# Patient Record
Sex: FEMALE | Race: WHITE | NOT HISPANIC OR LATINO | URBAN - METROPOLITAN AREA
[De-identification: names, ages, dates, MRNs, and addresses within clinical notes are randomized per-mention and may not be internally consistent; named-entity substitution may affect disease eponyms.]

---

## 2022-06-15 ENCOUNTER — EMERGENCY (EMERGENCY)
Facility: HOSPITAL | Age: 37
LOS: 1 days | Discharge: HOME | End: 2022-06-15
Attending: STUDENT IN AN ORGANIZED HEALTH CARE EDUCATION/TRAINING PROGRAM | Admitting: STUDENT IN AN ORGANIZED HEALTH CARE EDUCATION/TRAINING PROGRAM
Payer: COMMERCIAL

## 2022-06-15 VITALS
HEART RATE: 92 BPM | HEIGHT: 67 IN | WEIGHT: 212.08 LBS | RESPIRATION RATE: 16 BRPM | SYSTOLIC BLOOD PRESSURE: 140 MMHG | TEMPERATURE: 98 F | DIASTOLIC BLOOD PRESSURE: 100 MMHG | OXYGEN SATURATION: 98 %

## 2022-06-15 VITALS
SYSTOLIC BLOOD PRESSURE: 112 MMHG | TEMPERATURE: 98 F | OXYGEN SATURATION: 98 % | DIASTOLIC BLOOD PRESSURE: 69 MMHG | HEART RATE: 63 BPM | RESPIRATION RATE: 18 BRPM

## 2022-06-15 DIAGNOSIS — Z20.822 CONTACT WITH AND (SUSPECTED) EXPOSURE TO COVID-19: ICD-10-CM

## 2022-06-15 DIAGNOSIS — M54.9 DORSALGIA, UNSPECIFIED: ICD-10-CM

## 2022-06-15 DIAGNOSIS — M48.061 SPINAL STENOSIS, LUMBAR REGION WITHOUT NEUROGENIC CLAUDICATION: ICD-10-CM

## 2022-06-15 LAB
ALBUMIN SERPL ELPH-MCNC: 4.7 G/DL — SIGNIFICANT CHANGE UP (ref 3.3–5)
ALP SERPL-CCNC: 75 U/L — SIGNIFICANT CHANGE UP (ref 40–120)
ALT FLD-CCNC: 17 U/L — SIGNIFICANT CHANGE UP (ref 10–45)
ANION GAP SERPL CALC-SCNC: 12 MMOL/L — SIGNIFICANT CHANGE UP (ref 5–17)
APTT BLD: 33.7 SEC — SIGNIFICANT CHANGE UP (ref 27.5–35.5)
AST SERPL-CCNC: 23 U/L — SIGNIFICANT CHANGE UP (ref 10–40)
BASOPHILS # BLD AUTO: 0.07 K/UL — SIGNIFICANT CHANGE UP (ref 0–0.2)
BASOPHILS NFR BLD AUTO: 0.8 % — SIGNIFICANT CHANGE UP (ref 0–2)
BILIRUB SERPL-MCNC: 0.3 MG/DL — SIGNIFICANT CHANGE UP (ref 0.2–1.2)
BLD GP AB SCN SERPL QL: NEGATIVE — SIGNIFICANT CHANGE UP
BUN SERPL-MCNC: 11 MG/DL — SIGNIFICANT CHANGE UP (ref 7–23)
CALCIUM SERPL-MCNC: 10 MG/DL — SIGNIFICANT CHANGE UP (ref 8.4–10.5)
CHLORIDE SERPL-SCNC: 104 MMOL/L — SIGNIFICANT CHANGE UP (ref 96–108)
CO2 SERPL-SCNC: 25 MMOL/L — SIGNIFICANT CHANGE UP (ref 22–31)
CREAT SERPL-MCNC: 0.71 MG/DL — SIGNIFICANT CHANGE UP (ref 0.5–1.3)
EGFR: 112 ML/MIN/1.73M2 — SIGNIFICANT CHANGE UP
EOSINOPHIL # BLD AUTO: 0.32 K/UL — SIGNIFICANT CHANGE UP (ref 0–0.5)
EOSINOPHIL NFR BLD AUTO: 3.7 % — SIGNIFICANT CHANGE UP (ref 0–6)
GLUCOSE SERPL-MCNC: 112 MG/DL — HIGH (ref 70–99)
HCG SERPL-ACNC: <0 MIU/ML — SIGNIFICANT CHANGE UP
HCT VFR BLD CALC: 39 % — SIGNIFICANT CHANGE UP (ref 34.5–45)
HGB BLD-MCNC: 12.5 G/DL — SIGNIFICANT CHANGE UP (ref 11.5–15.5)
IMM GRANULOCYTES NFR BLD AUTO: 0.2 % — SIGNIFICANT CHANGE UP (ref 0–1.5)
INR BLD: 0.99 — SIGNIFICANT CHANGE UP (ref 0.88–1.16)
LYMPHOCYTES # BLD AUTO: 2.46 K/UL — SIGNIFICANT CHANGE UP (ref 1–3.3)
LYMPHOCYTES # BLD AUTO: 28.4 % — SIGNIFICANT CHANGE UP (ref 13–44)
MCHC RBC-ENTMCNC: 27.1 PG — SIGNIFICANT CHANGE UP (ref 27–34)
MCHC RBC-ENTMCNC: 32.1 GM/DL — SIGNIFICANT CHANGE UP (ref 32–36)
MCV RBC AUTO: 84.4 FL — SIGNIFICANT CHANGE UP (ref 80–100)
MONOCYTES # BLD AUTO: 0.43 K/UL — SIGNIFICANT CHANGE UP (ref 0–0.9)
MONOCYTES NFR BLD AUTO: 5 % — SIGNIFICANT CHANGE UP (ref 2–14)
NEUTROPHILS # BLD AUTO: 5.37 K/UL — SIGNIFICANT CHANGE UP (ref 1.8–7.4)
NEUTROPHILS NFR BLD AUTO: 61.9 % — SIGNIFICANT CHANGE UP (ref 43–77)
NRBC # BLD: 0 /100 WBCS — SIGNIFICANT CHANGE UP (ref 0–0)
PLATELET # BLD AUTO: 354 K/UL — SIGNIFICANT CHANGE UP (ref 150–400)
POTASSIUM SERPL-MCNC: 4.2 MMOL/L — SIGNIFICANT CHANGE UP (ref 3.5–5.3)
POTASSIUM SERPL-SCNC: 4.2 MMOL/L — SIGNIFICANT CHANGE UP (ref 3.5–5.3)
PROT SERPL-MCNC: 7.9 G/DL — SIGNIFICANT CHANGE UP (ref 6–8.3)
PROTHROM AB SERPL-ACNC: 11.8 SEC — SIGNIFICANT CHANGE UP (ref 10.5–13.4)
RBC # BLD: 4.62 M/UL — SIGNIFICANT CHANGE UP (ref 3.8–5.2)
RBC # FLD: 12 % — SIGNIFICANT CHANGE UP (ref 10.3–14.5)
RH IG SCN BLD-IMP: NEGATIVE — SIGNIFICANT CHANGE UP
SARS-COV-2 RNA SPEC QL NAA+PROBE: NEGATIVE — SIGNIFICANT CHANGE UP
SODIUM SERPL-SCNC: 141 MMOL/L — SIGNIFICANT CHANGE UP (ref 135–145)
WBC # BLD: 8.67 K/UL — SIGNIFICANT CHANGE UP (ref 3.8–10.5)
WBC # FLD AUTO: 8.67 K/UL — SIGNIFICANT CHANGE UP (ref 3.8–10.5)

## 2022-06-15 PROCEDURE — 93005 ELECTROCARDIOGRAM TRACING: CPT

## 2022-06-15 PROCEDURE — 85610 PROTHROMBIN TIME: CPT

## 2022-06-15 PROCEDURE — 85025 COMPLETE CBC W/AUTO DIFF WBC: CPT

## 2022-06-15 PROCEDURE — 87635 SARS-COV-2 COVID-19 AMP PRB: CPT

## 2022-06-15 PROCEDURE — 99285 EMERGENCY DEPT VISIT HI MDM: CPT | Mod: 25

## 2022-06-15 PROCEDURE — 36415 COLL VENOUS BLD VENIPUNCTURE: CPT

## 2022-06-15 PROCEDURE — 99284 EMERGENCY DEPT VISIT MOD MDM: CPT

## 2022-06-15 PROCEDURE — 85730 THROMBOPLASTIN TIME PARTIAL: CPT

## 2022-06-15 PROCEDURE — 86901 BLOOD TYPING SEROLOGIC RH(D): CPT

## 2022-06-15 PROCEDURE — 86900 BLOOD TYPING SEROLOGIC ABO: CPT

## 2022-06-15 PROCEDURE — 72110 X-RAY EXAM L-2 SPINE 4/>VWS: CPT | Mod: 26

## 2022-06-15 PROCEDURE — 80053 COMPREHEN METABOLIC PANEL: CPT

## 2022-06-15 PROCEDURE — 86850 RBC ANTIBODY SCREEN: CPT

## 2022-06-15 PROCEDURE — 72110 X-RAY EXAM L-2 SPINE 4/>VWS: CPT

## 2022-06-15 PROCEDURE — 84702 CHORIONIC GONADOTROPIN TEST: CPT

## 2022-06-15 RX ORDER — LIDOCAINE 4 G/100G
1 CREAM TOPICAL ONCE
Refills: 0 | Status: COMPLETED | OUTPATIENT
Start: 2022-06-15 | End: 2022-06-15

## 2022-06-15 RX ORDER — OXYCODONE HYDROCHLORIDE 5 MG/1
5 TABLET ORAL ONCE
Refills: 0 | Status: DISCONTINUED | OUTPATIENT
Start: 2022-06-15 | End: 2022-06-15

## 2022-06-15 RX ORDER — OXYCODONE AND ACETAMINOPHEN 5; 325 MG/1; MG/1
1 TABLET ORAL
Qty: 12 | Refills: 0
Start: 2022-06-15 | End: 2022-06-17

## 2022-06-15 RX ADMIN — LIDOCAINE 1 PATCH: 4 CREAM TOPICAL at 14:50

## 2022-06-15 RX ADMIN — OXYCODONE HYDROCHLORIDE 5 MILLIGRAM(S): 5 TABLET ORAL at 14:50

## 2022-06-15 NOTE — CONSULT NOTE ADULT - SUBJECTIVE AND OBJECTIVE BOX
36F otherwise healthy presenting with 3 months of progressively worsening posterolateral R leg pain. She admits to intermittent lower back pain and persist pain that radiates from right buttocks and down to posterior thigh. Very rarely it passes bellow the knee. She denies urinary and fecal incontinence. Denies saddle anesthesia, denies weakness of lower extremities.  Patient took a 6 days course of antibiotic and PT without good results.  she has a 11 months old child is presently breast feeding.   PMH: none  PSH: none  Home meds: none    Allergy: none  PE: well developed female in apparent  moderate distress.   neuro: A&O x 3. PERRL, EOMI  Lung: Clear to auscultation.  Heart: S1S2, regular  Abd: Soft, non tender, +BS  Pelvis: No saddle anesthesia, good rectal tone.  Ext: NO edema, Focal motor deficit. 5/5 x 4  No sensory deficit to touch. Subjective decreased sensory of lateral right thigh.    MRI L-spine done outside shows a herniated L5-S1 disc.    Assessment: 36 female with severe RLE radiculopathy  pain secondary to L5-S1 HNP.    Plan: in ER get a post void residual bladder scan.          in ER get xray L-spine flex and extension.          If post void residual is WNR patient may be discharged home with muscle relaxant and pain medication to follow up with Dr. D'Amico on 1 week.  Dr. D'Amico evaluated the mri , examined the patient and discussed this plan with patient. she agrees with it.

## 2022-06-15 NOTE — ED PROVIDER NOTE - NSFOLLOWUPINSTRUCTIONS_ED_ALL_ED_FT
You were seen for back pain.    You were evaluated by neurosurgery who cleared you for discharge and outpatient follow up.    Please see Dr. D'amico on Friday. Call for an appointment.

## 2022-06-15 NOTE — ED ADULT NURSE NOTE - OBJECTIVE STATEMENT
pt presents to ER c/o increasing lower back pain that radiates down her R leg for the past 3 months. denies any numbness. pt ambulating steadily.

## 2022-06-15 NOTE — ED PROVIDER NOTE - CLINICAL SUMMARY MEDICAL DECISION MAKING FREE TEXT BOX
36F otherwise healthy presenting with 3 months of progressively worsening posterolateral R leg pain, tingling. On exam, nontoxic appearing but uncomfortable, prefers standing, no midline spinal ttp, hip flexion 5/5 bilaterally, knee flexion/extension 5/5 bilaterally, ankle dorsi/plantarflexion 5/5, R posterolateral lower extremity numbness, no saddle anesthesia. MRI outpatient showing spinal canal stenosis @ L5/S1, L4/L5. NSGY consulted. Will check pre-op labs, treat pain, disposition pending work-up and response to treatment.

## 2022-06-15 NOTE — ED ADULT TRIAGE NOTE - OTHER COMPLAINTS
pt c.o worsening back pain since march. completed MRI dx ruptured disc. denies incontinence. sent for neurosurg eval

## 2022-06-15 NOTE — ED PROVIDER NOTE - CARE PROVIDER_API CALL
DAmico, Randy S (MD)  Neurosurgery  [address]  [place], [place] [address]  Phone: [phone]  Fax: [phone]  Follow Up Time: 1-3 Days

## 2022-06-15 NOTE — ED PROVIDER NOTE - PHYSICAL EXAMINATION
GENERAL: non-toxic appearing but uncomfortable, prefers standing  HEAD: atraumatic, normocephalic  EYES: vision grossly intact, no conjunctivitis or discharge  EARS: hearing grossly intact  NOSE: no nasal discharge, epistaxis   CARDIAC: RRR, normal S1S2,  no appreciable murmurs, no cyanosis, cap refill < 2 seconds  PULM: no respiratory distress, oxygen saturation on RA wnl, CTAB, no crackles, rales, rhonchi, or wheezing  GI: abdomen nondistended, soft, nontender, no guarding or rebound tenderness, no palpable masses  NEURO: awake and alert, follows commands, normal speech, PERRLA, EOMI, no focal motor or sensory deficits, normal gait  MSK: no midline spinal ttp, hip flexion 5/5 bilaterally, knee flexion/extension 5/5 bilaterally, ankle dorsi/plantarflexion 5/5, R posterolateral lower extremity numbness, no saddle anesthesia  EXT: no peripheral edema, calf tenderness, redness or swelling  SKIN: warm, dry, and intact, no rashes  PSYCH: appropriate mood and affect

## 2022-06-15 NOTE — ED PROVIDER NOTE - NS ED ROS FT
GENERAL: no fever, chills, fatigue, weight loss, night sweats  HEENT: no eye pain, discharge, conjunctivitis, ear pain, hearing loss, rhinorrhea, congestion, throat pain  CARDIAC: no chest pain, palpitations, lightheadedness, syncope  PULM: no dyspnea, wheezing  GI: no abdominal pain, nausea, vomiting, diarrhea, constipation, melena, hematochezia  : no urinary dysuria, frequency, incontinence, hematuria  NEURO: no headache, changes in vision, motor weakness  MSK: + R posterolateral leg pain/numbness  SKIN: no rashes  HEME: no active bleeding, excessive bruising

## 2022-06-15 NOTE — ED PROVIDER NOTE - OBJECTIVE STATEMENT
36F otherwise healthy presenting with 3 months of progressively worsening posterolateral R leg pain, tingling. Started during pregnancy, has worsened despite giving birth. Pain worse at night, with laying in bed and sitting. Pain tolerable when standing. Had an outpatient MRI spine performed 7 days ago which showed "central disc protrusions resulting in severe spinal canal stenosis @ L5-S1, moderate spinal canal stenosis at L4-5. No saddle anesthesia, bladder or bowel incontinence, night sweats, fevers. Denies weakness in LLE - just pain. Reports receiving a rectal exam outpatient, which she was told was normal. Has been trying Tylenol, gabapentin, motrin without relief. States she would be willing to try opiates even if it meant stopping breastfeeding.

## 2022-06-15 NOTE — ED PROVIDER NOTE - PROGRESS NOTE DETAILS
NSGY consulted. Ezequiel: patient will follow up with NSGY on friday. Seen by NSGY - safe for discharge from their perspective. Patient amenable to this plan.

## 2022-06-15 NOTE — ED ADULT TRIAGE NOTE - AS HEIGHT TYPE
Future Appointments   Date Time Provider Shanda Cavazos   11/5/2019 11:20 AM Eneida Aiken MD [facility]     Pt would like fasting BW orders sent to [facility].
See TE 7/11/19  CPE labs ordered per protocol.
stated

## 2022-06-15 NOTE — ED PROVIDER NOTE - PATIENT PORTAL LINK FT
You can access the FollowMyHealth Patient Portal offered by [facility] by registering at the following website: [email or web address]. By joining [facility]’s FollowMyHealth portal, you will also be able to view your health information using other applications (apps) compatible with our system.

## 2022-06-16 PROBLEM — Z00.00 ENCOUNTER FOR PREVENTIVE HEALTH EXAMINATION: Status: ACTIVE | Noted: 2022-06-16

## 2022-06-17 ENCOUNTER — NON-APPOINTMENT (OUTPATIENT)
Age: 37
End: 2022-06-17

## 2022-06-17 ENCOUNTER — APPOINTMENT (OUTPATIENT)
Dept: NEUROSURGERY | Facility: CLINIC | Age: 37
End: 2022-06-17
Payer: COMMERCIAL

## 2022-06-17 VITALS
BODY MASS INDEX: 32.13 KG/M2 | WEIGHT: 212 LBS | HEIGHT: 68 IN | DIASTOLIC BLOOD PRESSURE: 97 MMHG | SYSTOLIC BLOOD PRESSURE: 144 MMHG | RESPIRATION RATE: 18 BRPM | TEMPERATURE: 97 F | HEART RATE: 80 BPM | OXYGEN SATURATION: 98 %

## 2022-06-17 DIAGNOSIS — M54.16 RADICULOPATHY, LUMBAR REGION: ICD-10-CM

## 2022-06-17 DIAGNOSIS — Z78.9 OTHER SPECIFIED HEALTH STATUS: ICD-10-CM

## 2022-06-17 PROCEDURE — 99204 OFFICE O/P NEW MOD 45 MIN: CPT

## 2022-06-17 RX ORDER — ACETAMINOPHEN 325 MG/1
TABLET, FILM COATED ORAL
Refills: 0 | Status: ACTIVE | COMMUNITY

## 2022-06-17 NOTE — REVIEW OF SYSTEMS
[Numbness] : numbness [Tingling] : tingling [As Noted in HPI] : as noted in HPI [Fever] : no fever [Chills] : no chills [Leg Weakness] : no leg weakness [Chest Pain] : no chest pain [Palpitations] : no palpitations [Shortness Of Breath] : no shortness of breath [Cough] : no cough [Constipation] : no constipation

## 2022-06-17 NOTE — HISTORY OF PRESENT ILLNESS
[FreeTextEntry1] : 37 y/o female with no PMHx, never smoker, who presented to [facility] ER 6/15/22 with 3 months of progressively worsening posterolateral Right leg pain, tingling. She completed MRI outpatient 6/9/22 which showed central disc protrusions resulting in severe spinal canal stenosis @ L5-S1, moderate spinal canal stenosis at L4-5." Strength intact on exam. PVR 9. She was dc with prn percocet and outpatient f/u. \par \par TODAY pt presents for f/u. \par She states 3 months of progressively worsening right lower back pain radiating down right buttock to posterioolateral thigh with numbness and tingling. States right buttock pain and numbness is constant with intermittent pains that originate in right lower back and radiate down to right knee that are sharp, shooting, burning. States will occasionally go below right knee down calf and to right foot. She is unaware if top of bottom of foot. \par \par States she saw her PCP for pain in April and was ordered medrol dose pack and PT. States minimal relief while on steroids that subsided after completion of course. Denies relief with PT. She then returned and was started on gabapentin, tylenol and ordered for MRI. She denied relief with gabapentin. She saw orthopedic surgeon with MRI and was offered surgery. She was pending second opinion but states pain severe for which she went to [facility] ER. \par States pain limits her from sitting, lying. She states sleep severely impaired given pain. \par States she feels right foot is weak, numb, more noticeable when ambulating. \par Denies bowel/ bladder incontinence; however, states that over the past 2 days she feels trouble initiating urine when she feels the urge to void.

## 2022-06-17 NOTE — ASSESSMENT
[FreeTextEntry1] : 36-year-old female with 3 weeks of worsening back pain and lower limb radiculopathy found to have large L5-S1 disc herniation with cauda equina compression.  Patient with very mild right plantar flexion weakness.  No bowel and bladder symptoms, although reports mild worsening difficulty with urination.  We discussed surgery in the form of an L5-S1 open microdiscectomy.  We discussed the risks and benefits including CSF leak, infection, bowel bladder incontinence, nerve root injury.  Patient wishes to proceed with surgery but wants to consider options and possibly second opinions.  I counseled her for red flag signs and symptoms that should prompt urgent transport to the emergency department.  She demonstrated an excellent understanding.\par \par Dr. D' Amico independently reviewed all available images with patient, MRI lumbar spine 6/9/22, xray lumbar spine with flex/ex 6/15/22. \par \par After detailed imaging review and patient examination, Dr. D’Amico discussed surgical intervention with patient. Potential surgical risks vs benefits and alternatives discussed with time allotted for questions and answers given. \par Patient verbalizes understanding and wishes to proceed with surgical intervention. \par \par Pt wishes to call back with surgery date, states she will look into making plans for childcare.  \par \par Pt educated to go to ER with new/ worsening symptoms in the interim. \par  \par Patient verbalizes understanding of today’s discussion and next steps in treatment plan. \par \par \par A total of 45 minutes was spent reviewing the labs, imaging and physical examination of the patient. We discussed the diagnosis, and the plan. The patient's questions were answered. The patient demonstrated an excellent understanding of the plan. \par \par

## 2022-06-17 NOTE — PHYSICAL EXAM
[General Appearance - Alert] : alert [General Appearance - In No Acute Distress] : in no acute distress [Oriented To Time, Place, And Person] : oriented to person, place, and time [Impaired Insight] : insight and judgment were intact [Affect] : the affect was normal [Mood] : the mood was normal [Memory Recent] : recent memory was not impaired [Sensation Tactile Decrease] : light touch was intact [Balance] : balance was intact [Normal] : normal [Able to toe walk] : the patient was able to toe walk [Able to heel walk] : the patient was able to heel walk [Sclera] : the sclera and conjunctiva were normal [PERRL With Normal Accommodation] : pupils were equal in size, round, reactive to light, with normal accommodation [Hearing Threshold Finger Rub Not [place]] : hearing was normal [Neck Appearance] : the appearance of the neck was normal [] : no respiratory distress [Respiration, Rhythm And Depth] : normal respiratory rhythm and effort [Heart Rate And Rhythm] : heart rate was normal and rhythm regular [Abnormal Walk] : normal gait [Skin Color & Pigmentation] : normal skin color and pigmentation [Motor Tone] : muscle tone was normal in all four extremities [Motor Strength] : muscle strength was normal in all four extremities [No Spinal Tenderness] : no spinal tenderness [FreeTextEntry6] : Pt unable to sit due to reported pain

## 2022-06-17 NOTE — DATA REVIEWED
[de-identified] : MRI lumbar spine 6/9/22 [de-identified] : \par ACC: [id number] EXAM: XR LS SPINE FLEX EXT MIN 4V\par \par PROCEDURE DATE: 06/15/2022\par \par \par \par INTERPRETATION: CLINICAL INFORMATION: Back pain, lumbar disc herniation\par \par EXAM: AP, lateral neutral, and lateral flexion and extension radiographs of the lumbar spine. 4 views total.\par \par COMPARISON: Outside lumbar radiographs 06/14/2022. Also reviewed is outside MRI lumbar spine 06/09/2022\par \par FINDINGS:\par \par In the AP plane there is minimal undulating curvature without significant scoliosis. The lateral view shows maintain lordosis with mild retrolisthesis at L5-S1, estimated at 4 mm, showing no instability or dynamic motion with flexion or extension. There is diffuse disc loss at L5-S1 and remaining disc spaces appear preserved. Disc herniation visible at L5-S1 on recent outside MR with smaller herniation at L4-5. SI joints appear preserved as well. No compression deformity or other acute osseous abnormality. No change is identified compared to 06/14/2022.\par \par IMPRESSION:\par \par Degenerative disc height loss at L5-S1 with retrolisthesis; no instability upon flexion/extension.\par \par --- End of Report ---\par \par

## 2022-06-19 ENCOUNTER — TRANSCRIPTION ENCOUNTER (OUTPATIENT)
Age: 37
End: 2022-06-19

## 2022-06-20 ENCOUNTER — INPATIENT (INPATIENT)
Facility: HOSPITAL | Age: 37
LOS: 2 days | Discharge: HOME | DRG: 519 | End: 2022-06-23
Attending: NEUROLOGICAL SURGERY | Admitting: NEUROLOGICAL SURGERY
Payer: COMMERCIAL

## 2022-06-20 VITALS
SYSTOLIC BLOOD PRESSURE: 151 MMHG | RESPIRATION RATE: 18 BRPM | DIASTOLIC BLOOD PRESSURE: 92 MMHG | TEMPERATURE: 98 F | HEART RATE: 100 BPM | HEIGHT: 67 IN | WEIGHT: 212.08 LBS | OXYGEN SATURATION: 97 %

## 2022-06-20 DIAGNOSIS — M51.26 OTHER INTERVERTEBRAL DISC DISPLACEMENT, LUMBAR REGION: ICD-10-CM

## 2022-06-20 DIAGNOSIS — M51.17 INTERVERTEBRAL DISC DISORDERS WITH RADICULOPATHY, LUMBOSACRAL REGION: ICD-10-CM

## 2022-06-20 DIAGNOSIS — M48.061 SPINAL STENOSIS, LUMBAR REGION WITHOUT NEUROGENIC CLAUDICATION: ICD-10-CM

## 2022-06-20 DIAGNOSIS — Z20.822 CONTACT WITH AND (SUSPECTED) EXPOSURE TO COVID-19: ICD-10-CM
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
## 2022-06-20 DIAGNOSIS — G83.4 CAUDA EQUINA SYNDROME: ICD-10-CM

## 2022-06-20 DIAGNOSIS — R33.9 RETENTION OF URINE, UNSPECIFIED: ICD-10-CM

## 2022-06-20 DIAGNOSIS — G97.41 ACCIDENTAL PUNCTURE OR LACERATION OF DURA DURING A PROCEDURE: ICD-10-CM

## 2022-06-20 LAB
ALBUMIN SERPL ELPH-MCNC: 4.6 G/DL — SIGNIFICANT CHANGE UP (ref 3.3–5)
ALP SERPL-CCNC: 72 U/L — SIGNIFICANT CHANGE UP (ref 40–120)
ALT FLD-CCNC: 24 U/L — SIGNIFICANT CHANGE UP (ref 10–45)
ANION GAP SERPL CALC-SCNC: 11 MMOL/L — SIGNIFICANT CHANGE UP (ref 5–17)
APPEARANCE UR: CLEAR — SIGNIFICANT CHANGE UP
APTT BLD: 32.9 SEC — SIGNIFICANT CHANGE UP (ref 27.5–35.5)
AST SERPL-CCNC: 33 U/L — SIGNIFICANT CHANGE UP (ref 10–40)
BILIRUB SERPL-MCNC: 0.2 MG/DL — SIGNIFICANT CHANGE UP (ref 0.2–1.2)
BILIRUB UR-MCNC: NEGATIVE — SIGNIFICANT CHANGE UP
BLD GP AB SCN SERPL QL: NEGATIVE — SIGNIFICANT CHANGE UP
BUN SERPL-MCNC: 13 MG/DL — SIGNIFICANT CHANGE UP (ref 7–23)
CALCIUM SERPL-MCNC: 9.7 MG/DL — SIGNIFICANT CHANGE UP (ref 8.4–10.5)
CHLORIDE SERPL-SCNC: 104 MMOL/L — SIGNIFICANT CHANGE UP (ref 96–108)
CO2 SERPL-SCNC: 26 MMOL/L — SIGNIFICANT CHANGE UP (ref 22–31)
COLOR SPEC: YELLOW — SIGNIFICANT CHANGE UP
CREAT SERPL-MCNC: 0.62 MG/DL — SIGNIFICANT CHANGE UP (ref 0.5–1.3)
DIFF PNL FLD: NEGATIVE — SIGNIFICANT CHANGE UP
EGFR: 118 ML/MIN/1.73M2 — SIGNIFICANT CHANGE UP
GLUCOSE SERPL-MCNC: 110 MG/DL — HIGH (ref 70–99)
GLUCOSE UR QL: NEGATIVE — SIGNIFICANT CHANGE UP
HCG SERPL-ACNC: <0 MIU/ML — SIGNIFICANT CHANGE UP
HCT VFR BLD CALC: 35.7 % — SIGNIFICANT CHANGE UP (ref 34.5–45)
HGB BLD-MCNC: 11.7 G/DL — SIGNIFICANT CHANGE UP (ref 11.5–15.5)
INR BLD: 0.98 — SIGNIFICANT CHANGE UP (ref 0.88–1.16)
KETONES UR-MCNC: NEGATIVE — SIGNIFICANT CHANGE UP
LEUKOCYTE ESTERASE UR-ACNC: NEGATIVE — SIGNIFICANT CHANGE UP
MCHC RBC-ENTMCNC: 27.3 PG — SIGNIFICANT CHANGE UP (ref 27–34)
MCHC RBC-ENTMCNC: 32.8 GM/DL — SIGNIFICANT CHANGE UP (ref 32–36)
MCV RBC AUTO: 83.4 FL — SIGNIFICANT CHANGE UP (ref 80–100)
NITRITE UR-MCNC: NEGATIVE — SIGNIFICANT CHANGE UP
NRBC # BLD: 0 /100 WBCS — SIGNIFICANT CHANGE UP (ref 0–0)
PH UR: 6 — SIGNIFICANT CHANGE UP (ref 5–8)
PLATELET # BLD AUTO: 367 K/UL — SIGNIFICANT CHANGE UP (ref 150–400)
POTASSIUM SERPL-MCNC: 4.1 MMOL/L — SIGNIFICANT CHANGE UP (ref 3.5–5.3)
POTASSIUM SERPL-SCNC: 4.1 MMOL/L — SIGNIFICANT CHANGE UP (ref 3.5–5.3)
PROT SERPL-MCNC: 7.6 G/DL — SIGNIFICANT CHANGE UP (ref 6–8.3)
PROT UR-MCNC: NEGATIVE MG/DL — SIGNIFICANT CHANGE UP
PROTHROM AB SERPL-ACNC: 11.6 SEC — SIGNIFICANT CHANGE UP (ref 10.5–13.4)
RBC # BLD: 4.28 M/UL — SIGNIFICANT CHANGE UP (ref 3.8–5.2)
RBC # FLD: 12.3 % — SIGNIFICANT CHANGE UP (ref 10.3–14.5)
RH IG SCN BLD-IMP: NEGATIVE — SIGNIFICANT CHANGE UP
SARS-COV-2 RNA SPEC QL NAA+PROBE: NEGATIVE — SIGNIFICANT CHANGE UP
SODIUM SERPL-SCNC: 141 MMOL/L — SIGNIFICANT CHANGE UP (ref 135–145)
SP GR SPEC: 1.02 — SIGNIFICANT CHANGE UP (ref 1–1.03)
UROBILINOGEN FLD QL: 0.2 E.U./DL — SIGNIFICANT CHANGE UP
WBC # BLD: 8.43 K/UL — SIGNIFICANT CHANGE UP (ref 3.8–10.5)
WBC # FLD AUTO: 8.43 K/UL — SIGNIFICANT CHANGE UP (ref 3.8–10.5)

## 2022-06-20 PROCEDURE — 99285 EMERGENCY DEPT VISIT HI MDM: CPT

## 2022-06-20 PROCEDURE — 99232 SBSQ HOSP IP/OBS MODERATE 35: CPT

## 2022-06-20 PROCEDURE — 71045 X-RAY EXAM CHEST 1 VIEW: CPT | Mod: 26

## 2022-06-20 RX ORDER — ACETAMINOPHEN 500 MG
650 TABLET ORAL EVERY 6 HOURS
Refills: 0 | Status: DISCONTINUED | OUTPATIENT
Start: 2022-06-20 | End: 2022-06-21

## 2022-06-20 RX ORDER — GABAPENTIN 400 MG/1
300 CAPSULE ORAL EVERY 8 HOURS
Refills: 0 | Status: DISCONTINUED | OUTPATIENT
Start: 2022-06-20 | End: 2022-06-21

## 2022-06-20 RX ORDER — ACETAMINOPHEN 500 MG
1000 TABLET ORAL ONCE
Refills: 0 | Status: COMPLETED | OUTPATIENT
Start: 2022-06-20 | End: 2022-06-20

## 2022-06-20 RX ORDER — POVIDONE-IODINE 5 %
1 AEROSOL (ML) TOPICAL ONCE
Refills: 0 | Status: COMPLETED | OUTPATIENT
Start: 2022-06-20 | End: 2022-06-21

## 2022-06-20 RX ORDER — CHLORHEXIDINE GLUCONATE 213 G/1000ML
1 SOLUTION TOPICAL EVERY 12 HOURS
Refills: 0 | Status: COMPLETED | OUTPATIENT
Start: 2022-06-20 | End: 2022-06-21

## 2022-06-20 RX ORDER — CELECOXIB 200 MG/1
200 CAPSULE ORAL ONCE
Refills: 0 | Status: COMPLETED | OUTPATIENT
Start: 2022-06-21 | End: 2022-06-21

## 2022-06-20 RX ORDER — APREPITANT 80 MG/1
40 CAPSULE ORAL ONCE
Refills: 0 | Status: COMPLETED | OUTPATIENT
Start: 2022-06-21 | End: 2022-06-21

## 2022-06-20 RX ORDER — SENNA PLUS 8.6 MG/1
2 TABLET ORAL AT BEDTIME
Refills: 0 | Status: DISCONTINUED | OUTPATIENT
Start: 2022-06-20 | End: 2022-06-21

## 2022-06-20 RX ORDER — OXYCODONE HYDROCHLORIDE 5 MG/1
5 TABLET ORAL EVERY 6 HOURS
Refills: 0 | Status: DISCONTINUED | OUTPATIENT
Start: 2022-06-20 | End: 2022-06-21

## 2022-06-20 RX ORDER — SODIUM CHLORIDE 9 MG/ML
1000 INJECTION INTRAMUSCULAR; INTRAVENOUS; SUBCUTANEOUS
Refills: 0 | Status: DISCONTINUED | OUTPATIENT
Start: 2022-06-20 | End: 2022-06-21

## 2022-06-20 RX ORDER — INFLUENZA VIRUS VACCINE 15; 15; 15; 15 UG/.5ML; UG/.5ML; UG/.5ML; UG/.5ML
0.5 SUSPENSION INTRAMUSCULAR ONCE
Refills: 0 | Status: DISCONTINUED | OUTPATIENT
Start: 2022-06-20 | End: 2022-06-23

## 2022-06-20 RX ORDER — ONDANSETRON 8 MG/1
4 TABLET, FILM COATED ORAL EVERY 6 HOURS
Refills: 0 | Status: DISCONTINUED | OUTPATIENT
Start: 2022-06-20 | End: 2022-06-21

## 2022-06-20 RX ADMIN — CHLORHEXIDINE GLUCONATE 1 APPLICATION(S): 213 SOLUTION TOPICAL at 18:29

## 2022-06-20 RX ADMIN — GABAPENTIN 300 MILLIGRAM(S): 400 CAPSULE ORAL at 21:49

## 2022-06-20 RX ADMIN — Medication 1000 MILLIGRAM(S): at 23:59

## 2022-06-20 RX ADMIN — Medication 1 TABLET(S): at 21:49

## 2022-06-20 RX ADMIN — Medication 400 MILLIGRAM(S): at 15:55

## 2022-06-20 NOTE — H&P ADULT - HISTORY OF PRESENT ILLNESS
Pt is a 37yo f, h/o herniated disk at L5/S1 w/ spinal stenosis, shown 2 weeks ago on MRI. Pt presented w/ gradual onset of R lower back pain radiating down right lower extremity since last month. Also complains of tingling sensation to R great toe and an episode of difficulty walking due to "loss of sensation to right leg" which spontaneously resolved. Has been on gabapentin 300 TID and oxycodone with no improvement of symptoms. Pt is also breastfeeding and would wants to avoid oxycodone. urinary retention. Plan for microdiscectomy OR  tomorrow admitted to Dr. D'Amico for operative intervention.    Pt is a 35yo f, h/o herniated disk at L5/S1 w/ spinal stenosis, shown 2 weeks ago on MRI. Pt presented w/ gradual onset of R lower back pain radiating down right lower extremity since last month. Also complains of tingling sensation to R great toe and an episode of difficulty walking due to "loss of sensation to right leg" which spontaneously resolved. Has been on gabapentin 300 TID and oxycodone with no improvement of symptoms. Pt is also breastfeeding and would wants to avoid oxycodone. Pt denies any episodes of urinary or fecal incontinence.  Plan for microdiscectomy OR  tomorrow w/ Dr. D'Amico for operative intervention.

## 2022-06-20 NOTE — ED ADULT TRIAGE NOTE - CHIEF COMPLAINT QUOTE
Pt presents to ED co worsening back pain, scheduled to have surgery next week with Dr. D'amico. Denies injury or trauma, loss of control of bowels or bladder. Ambulatory into triage area.

## 2022-06-20 NOTE — PROGRESS NOTE ADULT - SUBJECTIVE AND OBJECTIVE BOX
Surgery: L5-S1 Microdiscectomy   Consent: Signed by patient vs HCP: Karina Duvall                    NAME/NUMBER of HCP:     Representative Consent: [X ] Signed by patient vs HCP                                                 [  ] N/A -> only for cerebral angiogram    No Known Allergies      T(C): 36.7 (06-20-22 @ 17:32), Max: 36.7 (06-20-22 @ 17:32)  HR: 78 (06-20-22 @ 17:32) (78 - 100)  BP: 120/84 (06-20-22 @ 17:32) (120/84 - 151/92)  RR: 18 (06-20-22 @ 17:32) (18 - 18)  SpO2: 97% (06-20-22 @ 17:32) (97% - 97%)  Wt(kg): --    EXAM:  General: NAD, pt is comfortably sitting up in bed, on room air  HEENT: CN II-XII grossly intact, PERRL 3mm briskly reactive, EOMI b/l, face symmetric, tongue midline, neck FROM  Cardiovascular: RRR, normal S1 and S2   Respiratory: lungs CTAB, no wheezing, rhonchi, or crackles   GI: normoactive BS to auscultation, abd soft, NTND   Neuro: A&Ox3, No aphasia, speech clear, no dysmetria, no pronator drift. Follows commands.  ACUNA x4 spontaneously, 5/5 strength in all extremities throughout. SILT throughout, decreased sensation to right lateral thigh  Extremities: distal pulses 2+ x4      06-20    141  |  104  |  13  ----------------------------<  110<H>  4.1   |  26  |  0.62    Ca    9.7      20 Jun 2022 16:16    TPro  7.6  /  Alb  4.6  /  TBili  0.2  /  DBili  x   /  AST  33  /  ALT  24  /  AlkPhos  72  06-20    CBC Full  -  ( 20 Jun 2022 16:16 )  WBC Count : 8.43 K/uL  RBC Count : 4.28 M/uL  Hemoglobin : 11.7 g/dL  Hematocrit : 35.7 %  Platelet Count - Automated : 367 K/uL  Mean Cell Volume : 83.4 fl  Mean Cell Hemoglobin : 27.3 pg  Mean Cell Hemoglobin Concentration : 32.8 gm/dL  Auto Neutrophil # : x  Auto Lymphocyte # : x  Auto Monocyte # : x  Auto Eosinophil # : x  Auto Basophil # : x  Auto Neutrophil % : x  Auto Lymphocyte % : x  Auto Monocyte % : x  Auto Eosinophil % : x  Auto Basophil % : x    PT/INR - ( 20 Jun 2022 16:16 )   PT: 11.6 sec;   INR: 0.98          PTT - ( 20 Jun 2022 16:16 )  PTT:32.9 sec    Pregnancy test (serum hcg for any female under 56, must be resulted day before OR): [X] Negative Result  [ ] Positive Result  [ ] N/A : male or female>55 y/o  Type & Screen (in past 72hrs):     2 Type & Screen within 72 hours if anticipate blood need in OR:  X Y _ N     Blood ordered and on hold for OR:   [ ] No need     [ ] 1u pRBC on hold      [X] 2u pRBC on hold    COVID swab (in past 48hrs): X Y  _N    CXR: pending   EKG: pending   ECHO: n/a  Medical Clearances: pending in AM  Other Clearances:     Last dose of antiplatelet/anticoagulation drug: n/a     Implanted Devices (pacemaker, drug pump...etc):  []YES   [X] NO                  If yes --> EPS consulted to interrogate device: [ ] YES  [ ] NO                            If yes -->  EPS called to let them know patient going for surgery: [ ] device needs to be turned off                                                                                                                                                 [ ] magnet needs to be placed for surgery                                                                                                                                                [ ] nothing to do per EP, may proceed with cautery use in OR                                       3M nasal swab ordered?  X Y  _N    Cranial surgery: Order written for hair to be shampooed night before surgery and morning before surgery  [] yes   []no  Chlorhexidine Wipes ordered for Neck Down?  X Y  _ N  (twice a day if 1 day before surgery, daily for 3 days if 3 days prior, daily if in ICU)                 Assessment:   Pt is a 37yo f, h/o herniated disk at L5/S1 w/ spinal stenosis, shown 2 weeks ago on MRI. Pt presented w/ gradual onset of R lower back pain radiating down right lower extremity since last month now planned for OR with Dr. D'Amico tomorrow     Plan:  - Consent signed and in chart  - NPO/IVF at midnight  - Pain control PRN  - Medical clearance pending  - COVID and pregnancy tests negative  - coags normal  - Type and screen active     Assessment and plan discussed with Dr. D'Amico    Assessment:  Present when checked    []  GCS  E   V  M     Heart Failure: []Acute, [] acute on chronic , []chronic  Heart Failure:  [] Diastolic (HFpEF), [] Systolic (HFrEF), []Combined (HFpEF and HFrEF), [] RHF, [] Pulm HTN, [] Other    [] CAMERON, [] ATN, [] AIN, [] other  [] CKD1, [] CKD2, [] CKD 3, [] CKD 4, [] CKD 5, []ESRD    Encephalopathy: [] Metabolic, [] Hepatic, [] toxic, [] Neurological, [] Other    Abnormal Nurtitional Status: [] malnurtition (see nutrition note), [ ]underweight: BMI < 19, [] morbid obesity: BMI >40, [] Cachexia    [] Sepsis  [] hypovolemic shock,[] cardiogenic shock, [] hemorrhagic shock, [] neuogenic shock  [] Acute Respiratory Failure  []Cerebral edema, [] Brain compression/ herniation,   [] Functional quadriplegia  [] Acute blood loss anemia   Surgery: L5-S1 Microdiscectomy   Consent: Signed by patient: Karina Duvall                        Representative Consent: [X ] Signed by patient                                     No Known Allergies      T(C): 36.7 (22 @ 17:32), Max: 36.7 (22 @ 17:32)  HR: 78 (22 @ 17:32) (78 - 100)  BP: 120/84 (22 @ 17:32) (120/84 - 151/92)  RR: 18 (22 @ 17:32) (18 - 18)  SpO2: 97% (22 @ 17:32) (97% - 97%)  Wt(kg): --    EXAM:  General: NAD, pt is comfortably sitting up in bed, on room air  HEENT: CN II-XII grossly intact, PERRL 3mm briskly reactive, EOMI b/l, face symmetric, tongue midline, neck FROM  Cardiovascular: RRR, normal S1 and S2   Respiratory: lungs CTAB, no wheezing, rhonchi, or crackles   GI: normoactive BS to auscultation, abd soft, NTND   Neuro: A&Ox3, No aphasia, speech clear, no dysmetria, no pronator drift. Follows commands.  ACUNA x4 spontaneously, 5/5 strength in all extremities throughout. SILT throughout, decreased sensation to right lateral thigh  Extremities: distal pulses 2+ x4        141  |  104  |  13  ----------------------------<  110<H>  4.1   |  26  |  0.62    Ca    9.7      2022 16:16    TPro  7.6  /  Alb  4.6  /  TBili  0.2  /  DBili  x   /  AST  33  /  ALT  24  /  AlkPhos  72      CBC Full  -  ( 2022 16:16 )  WBC Count : 8.43 K/uL  RBC Count : 4.28 M/uL  Hemoglobin : 11.7 g/dL  Hematocrit : 35.7 %  Platelet Count - Automated : 367 K/uL  Mean Cell Volume : 83.4 fl  Mean Cell Hemoglobin : 27.3 pg  Mean Cell Hemoglobin Concentration : 32.8 gm/dL  Auto Neutrophil # : x  Auto Lymphocyte # : x  Auto Monocyte # : x  Auto Eosinophil # : x  Auto Basophil # : x  Auto Neutrophil % : x  Auto Lymphocyte % : x  Auto Monocyte % : x  Auto Eosinophil % : x  Auto Basophil % : x    PT/INR - ( 2022 16:16 )   PT: 11.6 sec;   INR: 0.98          PTT - ( 2022 16:16 )  PTT:32.9 sec    Pregnancy test (serum hcg for any female under 56, must be resulted day before OR): [X] Negative Result  [ ] Positive Result  [ ] N/A : male or female>57 y/o  Type & Screen (in past 72hrs):     2 Type & Screen within 72 hours if anticipate blood need in OR:  X Y _ N     Blood ordered and on hold for OR:   [ ] No need     [ ] 1u pRBC on hold      [X] 2u pRBC on hold    COVID swab (in past 48hrs): X Y  _N    CXR:  WNL   EK/20 WNL   ECHO: n/a  Medical Clearances: pending in AM    Last dose of antiplatelet/anticoagulation drug: n/a     Implanted Devices (pacemaker, drug pump...etc):  []YES   [X] NO                  If yes --> EPS consulted to interrogate device: [ ] YES  [ ] NO                            If yes -->  EPS called to let them know patient going for surgery: [ ] device needs to be turned off                                                                                                                                                 [ ] magnet needs to be placed for surgery                                                                                                                                                [ ] nothing to do per EP, may proceed with cautery use in OR                                       3M nasal swab ordered?   [X] Y  _ N  Cranial surgery: Order written for hair to be shampooed night before surgery and morning before surgery  [] yes   [X]no  Chlorhexidine Wipes ordered for Neck Down?  [X] Y  _ N                   Assessment:   Pt is a 35yo f, h/o herniated disk at L5/S1 w/ spinal stenosis, shown 2 weeks ago on MRI. Pt presented w/ gradual onset of R lower back pain radiating down right lower extremity since last month now planned for OR with Dr. D'Amico tomorrow.     Plan:  - Consent signed and in chart  - NPO/IVF at midnight  - Pain control PRN  - Medical clearance pending  - COVID and pregnancy tests negative  - coags normal  - Type and screen active     Assessment and plan discussed with Dr. D'Amico

## 2022-06-20 NOTE — ED PROVIDER NOTE - NS ED ROS FT
CONSTITUTIONAL: No fever/chills.  NEURO: no headache; + tingling to right toe last week, denies numbness/weakness in extremities; no saddle anesthesia.  CV: no chest pain or palpitations  PULM: no dyspnea, cough, or hemoptysis  GI: no abdominal pain; no N/V; no BRBPR or melena; no diarrhea or fecal incontinence  : no dysuria/hematura/frequency; no urinary incontinence or retention  MSK: no neck pain; no joint pain; no swelling of extremities, + right lower back pain with radiation to the right lower leg  DERM: no rash or lesions

## 2022-06-20 NOTE — ED PROVIDER NOTE - PHYSICAL EXAMINATION
GENERAL: WD/WN, in NAD  NEURO: Alert and oriented.   HEAD: NC/AT  EYES: Clear bilaterally; Pupils equal and round  ENT: OP clear, no rhinorrhea  PULM: No signs of respiratory distress; lungs clear bilaterally  CV: RRR, S1S2, No MRG. Symmetric distal pulses bilaterally.  GI: Abdomen soft, nontender.  No abdominal masses or abnormal pulsations appreciated.    SKIN: normal color and turgor; no rash or lesions  MSK: + ambulatory, lrom 2nd to pain

## 2022-06-20 NOTE — ED PROVIDER NOTE - OBJECTIVE STATEMENT
37 y/o female with a PMHx of HNP (L5-S1) is here in the ED c/o worsening atraumatic back pain. Pt reports her back pain started in March. She describes a constant aching pain located on the right lower back with radiation to the right leg. Pain worsens with sitting and slightly improves with standing and leaning forward. Over the past week she experienced one episode of tingling to her right big toe with difficulty walking that lasted for a few seconds. In addition she noticed slight urinary hesitancy in which she feels the urge to urinate, however is unable to do so. She had an MRI of her lower back + for herniated disc and stenosis. Despite, tylenol, oxy and gabapentin the pain has increased affecting her quality of life and sleep. She denies the following: fever, chills, recent spinal injections, chest pain, sob, loss of urine/bm, saddle anesthesia.

## 2022-06-20 NOTE — ED PROVIDER NOTE - CLINICAL SUMMARY MEDICAL DECISION MAKING FREE TEXT BOX
35 y/o female here in the ED c/o increasing right lower back pain with radiation to the right lower leg associated with urinary hesitancy and x1 episode of tingling sensation to right big toe and possible foot drop to the right LE. No urinary incontinence or saddle anesthesia. No dysuria, frequency, less likley uti. No chest pain or sob, do not suspect cardiac. Discussed with NS, plan for labs and admission for surgical intervention tomorrow.

## 2022-06-20 NOTE — H&P ADULT - NSHPADDITIONALINFOADULT_GEN_ALL_CORE
Plan:  Plan:     NEURO:  -OR tomorrow   -gabapentin 300mg TID   - ERAS, pt actively breastfeeding so avoid oxycodone      CARDIO:  -normotensive BP goal  -no active issues     GI:  -bowel regimen prn  -no active issues     ENDO:  -no active issues     RENAL:  -IVF while NPO     HEME:  -SCDs   -no active issues     ID:   -afebrile     Dispo: OR Tuesday, PT/OT pending     Assesment and plan discussed w/ Dr. Randy D'Amico

## 2022-06-20 NOTE — H&P ADULT - ASSESSMENT
Pt is a 35yo F, h/o herniated disk at L5/S1 w/ spinal stenosis, shown 2 weeks ago on MRI. Pt presented w/ gradual onset of R lower back pain radiating down right lower extremity since last month. Also complains of tingling sensation to R great toe and an episode of difficulty walking due to "loss of sensation to right leg" which spontaneously resolved. Has been on gabapentin 300 TID and oxycodone with no improvement of symptoms. Pt is also breastfeeding and would wants to avoid oxycodone. Pt denies any episodes of urinary or fecal incontinence.  Plan for microdiscectomy OR  tomorrow w/ Dr. D'Amico for operative intervention.

## 2022-06-20 NOTE — ED ADULT NURSE NOTE - OBJECTIVE STATEMENT
Pt presenting with worsening back pain. Sent by MD for admission for herniated discs. Line and labs obtained. Endorsing intermit toe numbness. Denies fevers/chills/ha

## 2022-06-20 NOTE — H&P ADULT - PROBLEM SELECTOR PLAN 1
NEURO:  -OR tomorrow   -gabapentin 300mg TID   - ERAS, pt actively breastfeeding so avoid oxycodone      CARDIO:  -normotensive BP goal  -no active issues     GI:  -bowel regimen prn  -no active issues NEURO:  -OR tomorrow   -gabapentin 300mg TID   - ERAS, pt actively breastfeeding so avoid oxycodone

## 2022-06-20 NOTE — H&P ADULT - NSHPPHYSICALEXAM_GEN_ALL_CORE
Constitutional:  37 y/o  female awake, alert seen standing by bed in ER appearing in some mild pain.   Eyes:  Sclera anicteric, conjunctiva noninjected.   ENMT: Oropharyngeal mucosa moist, pink. Tongue midline.    Respiratory: Clear to auscultation bilaterally.  No rales, rhonchi, wheezes.  Cardiovascular: Regular rate and rhythm.  S1, S2 heard.  Gastrointestinal:  Soft, nontender, nondistended.  +BS.  Vascular: Extremities warm, no ulcers, no discoloration of skin.   Neurological: Gen: AA&O x 3, conversant, appropriate. CN II-XII grossly intact. ACUNA x 4, 5/5 throughout UE/LE. Sensation intact to light touch throughout. DTRs: 2+ symmetric throughout. Hoffmans negative bilaterally.  Plantar downgoing bilaterally.  No clonus. No pronator drift, no dysmetria. Mild loss of sensation to R lateral thigh extending to R buttock  Skin: Warm, dry, no erythema

## 2022-06-20 NOTE — PATIENT PROFILE ADULT - FALL HARM RISK - HARM RISK INTERVENTIONS
Assistance with ambulation/Assistance OOB with selected safe patient handling equipment/Communicate Risk of Fall with Harm to all staff/Discuss with provider need for PT consult/Monitor gait and stability/Reinforce activity limits and safety measures with patient and family/Tailored Fall Risk Interventions/Visual Cue: Yellow wristband and red socks/Bed in lowest position, wheels locked, appropriate side rails in place/Call bell, personal items and telephone in reach/Instruct patient to call for assistance before getting out of bed or chair/Non-slip footwear when patient is out of bed/[place] to call system/Physically safe environment - no spills, clutter or unnecessary equipment/Purposeful Proactive Rounding/Room/bathroom lighting operational, light cord in reach

## 2022-06-20 NOTE — ED PROVIDER NOTE - ATTENDING APP SHARED VISIT CONTRIBUTION OF CARE
Pt is a 35yo f, h/o herniated disk at L5/S1 w/ spinal stenosis, dx'd 2 wks ago by mri, who p/w gradual onset of r lower back pain radiating down rle since last month. + tingling sensation to r great toe and difficulty walking which spontaneously resolved. Has been on gabapentin and oxycodone with no improvement of sx's. + mild urinary retention. AVSS. PE as above. Neuro exam non-focal. Pt seen by nsg and to be admitted to Dr. Damico's [facility] for operative intervention tomorrow.

## 2022-06-21 ENCOUNTER — RESULT REVIEW (OUTPATIENT)
Age: 37
End: 2022-06-21

## 2022-06-21 ENCOUNTER — TRANSCRIPTION ENCOUNTER (OUTPATIENT)
Age: 37
End: 2022-06-21

## 2022-06-21 LAB
ANION GAP SERPL CALC-SCNC: 10 MMOL/L — SIGNIFICANT CHANGE UP (ref 5–17)
APTT BLD: 33.2 SEC — SIGNIFICANT CHANGE UP (ref 27.5–35.5)
BLD GP AB SCN SERPL QL: NEGATIVE — SIGNIFICANT CHANGE UP
BUN SERPL-MCNC: 11 MG/DL — SIGNIFICANT CHANGE UP (ref 7–23)
CALCIUM SERPL-MCNC: 9.4 MG/DL — SIGNIFICANT CHANGE UP (ref 8.4–10.5)
CHLORIDE SERPL-SCNC: 105 MMOL/L — SIGNIFICANT CHANGE UP (ref 96–108)
CO2 SERPL-SCNC: 26 MMOL/L — SIGNIFICANT CHANGE UP (ref 22–31)
CREAT SERPL-MCNC: 0.65 MG/DL — SIGNIFICANT CHANGE UP (ref 0.5–1.3)
EGFR: 116 ML/MIN/1.73M2 — SIGNIFICANT CHANGE UP
GLUCOSE SERPL-MCNC: 99 MG/DL — SIGNIFICANT CHANGE UP (ref 70–99)
HCT VFR BLD CALC: 37.1 % — SIGNIFICANT CHANGE UP (ref 34.5–45)
HGB BLD-MCNC: 11.6 G/DL — SIGNIFICANT CHANGE UP (ref 11.5–15.5)
INR BLD: 0.96 — SIGNIFICANT CHANGE UP (ref 0.88–1.16)
MAGNESIUM SERPL-MCNC: 2 MG/DL — SIGNIFICANT CHANGE UP (ref 1.6–2.6)
MCHC RBC-ENTMCNC: 26.9 PG — LOW (ref 27–34)
MCHC RBC-ENTMCNC: 31.3 GM/DL — LOW (ref 32–36)
MCV RBC AUTO: 85.9 FL — SIGNIFICANT CHANGE UP (ref 80–100)
NRBC # BLD: 0 /100 WBCS — SIGNIFICANT CHANGE UP (ref 0–0)
PHOSPHATE SERPL-MCNC: 3.7 MG/DL — SIGNIFICANT CHANGE UP (ref 2.5–4.5)
PLATELET # BLD AUTO: 343 K/UL — SIGNIFICANT CHANGE UP (ref 150–400)
POTASSIUM SERPL-MCNC: 4.2 MMOL/L — SIGNIFICANT CHANGE UP (ref 3.5–5.3)
POTASSIUM SERPL-SCNC: 4.2 MMOL/L — SIGNIFICANT CHANGE UP (ref 3.5–5.3)
PROTHROM AB SERPL-ACNC: 11.4 SEC — SIGNIFICANT CHANGE UP (ref 10.5–13.4)
RBC # BLD: 4.32 M/UL — SIGNIFICANT CHANGE UP (ref 3.8–5.2)
RBC # FLD: 12.3 % — SIGNIFICANT CHANGE UP (ref 10.3–14.5)
RH IG SCN BLD-IMP: NEGATIVE — SIGNIFICANT CHANGE UP
SODIUM SERPL-SCNC: 141 MMOL/L — SIGNIFICANT CHANGE UP (ref 135–145)
WBC # BLD: 7.81 K/UL — SIGNIFICANT CHANGE UP (ref 3.8–10.5)
WBC # FLD AUTO: 7.81 K/UL — SIGNIFICANT CHANGE UP (ref 3.8–10.5)

## 2022-06-21 PROCEDURE — 99254 IP/OBS CNSLTJ NEW/EST MOD 60: CPT

## 2022-06-21 PROCEDURE — 63030 LAMOT DCMPRN NRV RT 1 LMBR: CPT | Mod: LT

## 2022-06-21 PROCEDURE — 88304 TISSUE EXAM BY PATHOLOGIST: CPT | Mod: 26

## 2022-06-21 DEVICE — COLLAGEN DURAMATRIX ONLAY 3X3IN: Type: IMPLANTABLE DEVICE | Status: FUNCTIONAL

## 2022-06-21 DEVICE — ARTHREX GUIDE PIN W SUTURE EYE 2.4MM: Type: IMPLANTABLE DEVICE | Status: FUNCTIONAL

## 2022-06-21 DEVICE — SURGIFOAM PAD 8CM X 12.5CM X 10MM (100): Type: IMPLANTABLE DEVICE | Status: FUNCTIONAL

## 2022-06-21 DEVICE — DURASEAL: Type: IMPLANTABLE DEVICE | Status: FUNCTIONAL

## 2022-06-21 DEVICE — SURGIFLO HEMOSTATIC MATRIX KIT: Type: IMPLANTABLE DEVICE | Status: FUNCTIONAL

## 2022-06-21 RX ORDER — BENZOCAINE AND MENTHOL 5; 1 G/100ML; G/100ML
1 LIQUID ORAL
Refills: 0 | Status: DISCONTINUED | OUTPATIENT
Start: 2022-06-21 | End: 2022-06-23

## 2022-06-21 RX ORDER — HYDROMORPHONE HYDROCHLORIDE 2 MG/ML
0.5 INJECTION INTRAMUSCULAR; INTRAVENOUS; SUBCUTANEOUS ONCE
Refills: 0 | Status: DISCONTINUED | OUTPATIENT
Start: 2022-06-21 | End: 2022-06-21

## 2022-06-21 RX ORDER — OXYCODONE HYDROCHLORIDE 5 MG/1
5 TABLET ORAL EVERY 4 HOURS
Refills: 0 | Status: DISCONTINUED | OUTPATIENT
Start: 2022-06-21 | End: 2022-06-22

## 2022-06-21 RX ORDER — ONDANSETRON 8 MG/1
4 TABLET, FILM COATED ORAL EVERY 6 HOURS
Refills: 0 | Status: DISCONTINUED | OUTPATIENT
Start: 2022-06-21 | End: 2022-06-23

## 2022-06-21 RX ORDER — ACETAMINOPHEN 500 MG
1000 TABLET ORAL EVERY 8 HOURS
Refills: 0 | Status: DISCONTINUED | OUTPATIENT
Start: 2022-06-21 | End: 2022-06-22

## 2022-06-21 RX ORDER — SENNA PLUS 8.6 MG/1
2 TABLET ORAL AT BEDTIME
Refills: 0 | Status: DISCONTINUED | OUTPATIENT
Start: 2022-06-21 | End: 2022-06-23

## 2022-06-21 RX ORDER — ACETAMINOPHEN 500 MG
1000 TABLET ORAL ONCE
Refills: 0 | Status: COMPLETED | OUTPATIENT
Start: 2022-06-21 | End: 2022-06-20

## 2022-06-21 RX ORDER — POLYETHYLENE GLYCOL 3350 17 G/17G
17 POWDER, FOR SOLUTION ORAL DAILY
Refills: 0 | Status: DISCONTINUED | OUTPATIENT
Start: 2022-06-21 | End: 2022-06-23

## 2022-06-21 RX ORDER — ENOXAPARIN SODIUM 100 MG/ML
40 INJECTION SUBCUTANEOUS EVERY 24 HOURS
Refills: 0 | Status: DISCONTINUED | OUTPATIENT
Start: 2022-06-22 | End: 2022-06-23

## 2022-06-21 RX ORDER — METHOCARBAMOL 500 MG/1
500 TABLET, FILM COATED ORAL EVERY 8 HOURS
Refills: 0 | Status: DISCONTINUED | OUTPATIENT
Start: 2022-06-21 | End: 2022-06-23

## 2022-06-21 RX ORDER — CEFAZOLIN SODIUM 1 G
2000 VIAL (EA) INJECTION EVERY 8 HOURS
Refills: 0 | Status: COMPLETED | OUTPATIENT
Start: 2022-06-21 | End: 2022-06-22

## 2022-06-21 RX ORDER — SODIUM CHLORIDE 9 MG/ML
1000 INJECTION INTRAMUSCULAR; INTRAVENOUS; SUBCUTANEOUS
Refills: 0 | Status: DISCONTINUED | OUTPATIENT
Start: 2022-06-21 | End: 2022-06-22

## 2022-06-21 RX ADMIN — Medication 1 APPLICATION(S): at 08:56

## 2022-06-21 RX ADMIN — GABAPENTIN 300 MILLIGRAM(S): 400 CAPSULE ORAL at 05:14

## 2022-06-21 RX ADMIN — SODIUM CHLORIDE 100 MILLILITER(S): 9 INJECTION INTRAMUSCULAR; INTRAVENOUS; SUBCUTANEOUS at 01:36

## 2022-06-21 RX ADMIN — Medication 50 MILLIGRAM(S): at 14:33

## 2022-06-21 RX ADMIN — SODIUM CHLORIDE 95 MILLILITER(S): 9 INJECTION INTRAMUSCULAR; INTRAVENOUS; SUBCUTANEOUS at 12:43

## 2022-06-21 RX ADMIN — HYDROMORPHONE HYDROCHLORIDE 0.5 MILLIGRAM(S): 2 INJECTION INTRAMUSCULAR; INTRAVENOUS; SUBCUTANEOUS at 13:31

## 2022-06-21 RX ADMIN — HYDROMORPHONE HYDROCHLORIDE 0.5 MILLIGRAM(S): 2 INJECTION INTRAMUSCULAR; INTRAVENOUS; SUBCUTANEOUS at 13:46

## 2022-06-21 RX ADMIN — METHOCARBAMOL 500 MILLIGRAM(S): 500 TABLET, FILM COATED ORAL at 20:40

## 2022-06-21 RX ADMIN — HYDROMORPHONE HYDROCHLORIDE 0.5 MILLIGRAM(S): 2 INJECTION INTRAMUSCULAR; INTRAVENOUS; SUBCUTANEOUS at 13:00

## 2022-06-21 RX ADMIN — METHOCARBAMOL 500 MILLIGRAM(S): 500 TABLET, FILM COATED ORAL at 15:34

## 2022-06-21 RX ADMIN — OXYCODONE HYDROCHLORIDE 5 MILLIGRAM(S): 5 TABLET ORAL at 21:52

## 2022-06-21 RX ADMIN — OXYCODONE HYDROCHLORIDE 5 MILLIGRAM(S): 5 TABLET ORAL at 17:50

## 2022-06-21 RX ADMIN — Medication 1000 MILLIGRAM(S): at 01:59

## 2022-06-21 RX ADMIN — Medication 50 MILLIGRAM(S): at 20:40

## 2022-06-21 RX ADMIN — OXYCODONE HYDROCHLORIDE 5 MILLIGRAM(S): 5 TABLET ORAL at 22:52

## 2022-06-21 RX ADMIN — CELECOXIB 200 MILLIGRAM(S): 200 CAPSULE ORAL at 08:50

## 2022-06-21 RX ADMIN — HYDROMORPHONE HYDROCHLORIDE 0.5 MILLIGRAM(S): 2 INJECTION INTRAMUSCULAR; INTRAVENOUS; SUBCUTANEOUS at 12:42

## 2022-06-21 RX ADMIN — CHLORHEXIDINE GLUCONATE 1 APPLICATION(S): 213 SOLUTION TOPICAL at 05:14

## 2022-06-21 RX ADMIN — Medication 5 MILLIGRAM(S): at 17:51

## 2022-06-21 RX ADMIN — OXYCODONE HYDROCHLORIDE 5 MILLIGRAM(S): 5 TABLET ORAL at 18:50

## 2022-06-21 RX ADMIN — Medication 1000 MILLIGRAM(S): at 18:50

## 2022-06-21 RX ADMIN — Medication 1000 MILLIGRAM(S): at 17:50

## 2022-06-21 RX ADMIN — Medication 100 MILLIGRAM(S): at 17:51

## 2022-06-21 RX ADMIN — CELECOXIB 200 MILLIGRAM(S): 200 CAPSULE ORAL at 09:50

## 2022-06-21 RX ADMIN — APREPITANT 40 MILLIGRAM(S): 80 CAPSULE ORAL at 08:50

## 2022-06-21 NOTE — CONSULT NOTE ADULT - SUBJECTIVE AND OBJECTIVE BOX
See neurosurgery HPI for details.      Recovering in PACU - still has some numbness in her right foot, which is one of the symptoms she was having before admission.      VS and labs reviewed    Physical exam:  General: no acute distress, slightly drowsy and recovering in PACU  HEENT: normocephalic, atraumatic, EOMI  Cards: RRR, normal S1/S2, no murmurs, rubs or gallops  Pulm: CTAB, no wheeze, crackles, rales or rhonchi  Ab: soft, nontender, nondistended, normoactive bowel sounds  Ext: warm and well perfused, no edema  Neuro: numbness of right foot, otherwise grossly nonfocal exam

## 2022-06-21 NOTE — CONSULT NOTE ADULT - ASSESSMENT
36F with PMH of spinal stenosis and herniated disk presenting with R lower back pain, and found to have a herniated disk at L5/S1.   S/p laminectomy, and likely discharge tomorrow.     #Herniated disk  #S/p laminectomy  - CSF leak reported, will need to lie flat on her back  - pain management per primary team  - PT consult tomorrow  - possibly home tomorrow afternoon

## 2022-06-21 NOTE — PROGRESS NOTE ADULT - SUBJECTIVE AND OBJECTIVE BOX
NEUROSURGERY POST OP NOTE:    POD# 0 S/P L5-S1 microdiscectomy    S: pt complaining of burning sensation in back near incision. reports R lateral thigh numbness same as preop baseline. denies weakness, HA, SOB, N/V, CP      T(C): 35.9 (06-21-22 @ 12:26), Max: 36.9 (06-21-22 @ 08:55)  HR: 70 (06-21-22 @ 13:11) (66 - 100)  BP: 114/68 (06-21-22 @ 13:11) (96/56 - 151/92)  RR: 11 (06-21-22 @ 13:11) (10 - 18)  SpO2: 100% (06-21-22 @ 13:11) (96% - 100%)      06-20-22 @ 07:01  -  06-21-22 @ 07:00  --------------------------------------------------------  IN: 0 mL / OUT: 1000 mL / NET: -1000 mL    06-21-22 @ 07:01  -  06-21-22 @ 13:33  --------------------------------------------------------  IN: 1690 mL / OUT: 50 mL / NET: 1640 mL        acetaminophen     Tablet .. 1000 milliGRAM(s) Oral every 8 hours  bisacodyl 5 milliGRAM(s) Oral every 12 hours  ceFAZolin   IVPB 2000 milliGRAM(s) IV Intermittent every 8 hours  influenza   Vaccine 0.5 milliLiter(s) IntraMuscular once  methocarbamol 500 milliGRAM(s) Oral every 8 hours  multivitamin 1 Tablet(s) Oral daily  ondansetron   Disintegrating Tablet 4 milliGRAM(s) Oral every 6 hours  oxyCODONE    IR 5 milliGRAM(s) Oral every 4 hours PRN  polyethylene glycol 3350 17 Gram(s) Oral daily PRN  pregabalin 50 milliGRAM(s) Oral three times a day  senna 2 Tablet(s) Oral at bedtime  sodium chloride 0.9%. 1000 milliLiter(s) IV Continuous <Continuous>      RADIOLOGY:     Exam:  GEN: laying in bed, appears well, NAD  NEURO: AOx3. FC, OE spont, speech intact, face symmetric. CNII-XII intact. PERRL, EOMI. No pronator drift. MAEx4. 5/5 strength throughout. decreased sensation to light touch over R lateral thigh.  CV: RRR +S1/S2  PULM: CTAB  GI: Abd soft, NT/ND  EXT: ext warm, dry, nontender  WOUND: lumbar incision c/d/i    WOUND/DRAINS:    DEVICES:       Assessment: Pt is a 37yo f, h/o herniated disk at L5/S1 w/ spinal stenosis, shown 2 weeks ago on MRI. Pt presented w/ gradual onset of R lower back pain radiating down right lower extremity since last month. now s/p L5-S1 microdiscetomy, CSF leak repair (6/21/22)      Plan:  NEURO:  - neuro checks q4h  - ERAS  - flat until 6/22 am due to CSF leak  - no post op imaging needed    CARDIO:  - normotensive BP goal    GI:  - ADAT  - bowel regimen prn    RENAL:  - IVF while NPO     ENDO:  - no active issues     HEME:  - SCDs     ID:   - afebrile     Dispo: pending pt/ot    Assesment and plan discussed w/ Dr. Randy D'Amico       Assessment:  Present when checked    []  GCS  E   V  M     Heart Failure: []Acute, [] acute on chronic , []chronic  Heart Failure:  [] Diastolic (HFpEF), [] Systolic (HFrEF), []Combined (HFpEF and HFrEF), [] RHF, [] Pulm HTN, [] Other    [] CAMERON, [] ATN, [] AIN, [] other  [] CKD1, [] CKD2, [] CKD 3, [] CKD 4, [] CKD 5, []ESRD    Encephalopathy: [] Metabolic, [] Hepatic, [] toxic, [] Neurological, [] Other    Abnormal Nurtitional Status: [] malnurtition (see nutrition note), [ ]underweight: BMI < 19, [] morbid obesity: BMI >40, [] Cachexia    [] Sepsis  [] hypovolemic shock,[] cardiogenic shock, [] hemorrhagic shock, [] neuogenic shock  [] Acute Respiratory Failure  []Cerebral edema, [] Brain compression/ herniation,   [] Functional quadriplegia  [] Acute blood loss anemia

## 2022-06-22 LAB
ANION GAP SERPL CALC-SCNC: 8 MMOL/L — SIGNIFICANT CHANGE UP (ref 5–17)
BUN SERPL-MCNC: 9 MG/DL — SIGNIFICANT CHANGE UP (ref 7–23)
CALCIUM SERPL-MCNC: 8.7 MG/DL — SIGNIFICANT CHANGE UP (ref 8.4–10.5)
CHLORIDE SERPL-SCNC: 108 MMOL/L — SIGNIFICANT CHANGE UP (ref 96–108)
CO2 SERPL-SCNC: 25 MMOL/L — SIGNIFICANT CHANGE UP (ref 22–31)
CREAT SERPL-MCNC: 0.65 MG/DL — SIGNIFICANT CHANGE UP (ref 0.5–1.3)
CULTURE RESULTS: SIGNIFICANT CHANGE UP
EGFR: 116 ML/MIN/1.73M2 — SIGNIFICANT CHANGE UP
GLUCOSE SERPL-MCNC: 108 MG/DL — HIGH (ref 70–99)
HCT VFR BLD CALC: 34.5 % — SIGNIFICANT CHANGE UP (ref 34.5–45)
HGB BLD-MCNC: 11.1 G/DL — LOW (ref 11.5–15.5)
MAGNESIUM SERPL-MCNC: 1.9 MG/DL — SIGNIFICANT CHANGE UP (ref 1.6–2.6)
MCHC RBC-ENTMCNC: 27.1 PG — SIGNIFICANT CHANGE UP (ref 27–34)
MCHC RBC-ENTMCNC: 32.2 GM/DL — SIGNIFICANT CHANGE UP (ref 32–36)
MCV RBC AUTO: 84.4 FL — SIGNIFICANT CHANGE UP (ref 80–100)
NRBC # BLD: 0 /100 WBCS — SIGNIFICANT CHANGE UP (ref 0–0)
PHOSPHATE SERPL-MCNC: 3.6 MG/DL — SIGNIFICANT CHANGE UP (ref 2.5–4.5)
PLATELET # BLD AUTO: 326 K/UL — SIGNIFICANT CHANGE UP (ref 150–400)
POTASSIUM SERPL-MCNC: 4.1 MMOL/L — SIGNIFICANT CHANGE UP (ref 3.5–5.3)
POTASSIUM SERPL-SCNC: 4.1 MMOL/L — SIGNIFICANT CHANGE UP (ref 3.5–5.3)
RBC # BLD: 4.09 M/UL — SIGNIFICANT CHANGE UP (ref 3.8–5.2)
RBC # FLD: 12.3 % — SIGNIFICANT CHANGE UP (ref 10.3–14.5)
SODIUM SERPL-SCNC: 141 MMOL/L — SIGNIFICANT CHANGE UP (ref 135–145)
SPECIMEN SOURCE: SIGNIFICANT CHANGE UP
WBC # BLD: 10.48 K/UL — SIGNIFICANT CHANGE UP (ref 3.8–10.5)
WBC # FLD AUTO: 10.48 K/UL — SIGNIFICANT CHANGE UP (ref 3.8–10.5)

## 2022-06-22 PROCEDURE — 99232 SBSQ HOSP IP/OBS MODERATE 35: CPT

## 2022-06-22 PROCEDURE — 99024 POSTOP FOLLOW-UP VISIT: CPT

## 2022-06-22 RX ORDER — OXYCODONE HYDROCHLORIDE 5 MG/1
5 TABLET ORAL EVERY 4 HOURS
Refills: 0 | Status: DISCONTINUED | OUTPATIENT
Start: 2022-06-22 | End: 2022-06-23

## 2022-06-22 RX ORDER — ACETAMINOPHEN 500 MG
650 TABLET ORAL EVERY 8 HOURS
Refills: 0 | Status: DISCONTINUED | OUTPATIENT
Start: 2022-06-22 | End: 2022-06-23

## 2022-06-22 RX ORDER — GABAPENTIN 400 MG/1
400 CAPSULE ORAL EVERY 8 HOURS
Refills: 0 | Status: DISCONTINUED | OUTPATIENT
Start: 2022-06-22 | End: 2022-06-23

## 2022-06-22 RX ORDER — KETOROLAC TROMETHAMINE 30 MG/ML
30 SYRINGE (ML) INJECTION EVERY 8 HOURS
Refills: 0 | Status: DISCONTINUED | OUTPATIENT
Start: 2022-06-22 | End: 2022-06-23

## 2022-06-22 RX ORDER — OXYCODONE HYDROCHLORIDE 5 MG/1
10 TABLET ORAL EVERY 4 HOURS
Refills: 0 | Status: DISCONTINUED | OUTPATIENT
Start: 2022-06-22 | End: 2022-06-23

## 2022-06-22 RX ORDER — SODIUM CHLORIDE 9 MG/ML
1000 INJECTION INTRAMUSCULAR; INTRAVENOUS; SUBCUTANEOUS
Refills: 0 | Status: DISCONTINUED | OUTPATIENT
Start: 2022-06-22 | End: 2022-06-23

## 2022-06-22 RX ORDER — SIMETHICONE 80 MG/1
80 TABLET, CHEWABLE ORAL EVERY 6 HOURS
Refills: 0 | Status: DISCONTINUED | OUTPATIENT
Start: 2022-06-22 | End: 2022-06-23

## 2022-06-22 RX ADMIN — OXYCODONE HYDROCHLORIDE 5 MILLIGRAM(S): 5 TABLET ORAL at 12:59

## 2022-06-22 RX ADMIN — ONDANSETRON 4 MILLIGRAM(S): 8 TABLET, FILM COATED ORAL at 05:37

## 2022-06-22 RX ADMIN — Medication 650 MILLIGRAM(S): at 19:55

## 2022-06-22 RX ADMIN — Medication 30 MILLIGRAM(S): at 13:54

## 2022-06-22 RX ADMIN — METHOCARBAMOL 500 MILLIGRAM(S): 500 TABLET, FILM COATED ORAL at 21:27

## 2022-06-22 RX ADMIN — Medication 100 MILLIGRAM(S): at 00:54

## 2022-06-22 RX ADMIN — GABAPENTIN 400 MILLIGRAM(S): 400 CAPSULE ORAL at 21:26

## 2022-06-22 RX ADMIN — METHOCARBAMOL 500 MILLIGRAM(S): 500 TABLET, FILM COATED ORAL at 14:31

## 2022-06-22 RX ADMIN — OXYCODONE HYDROCHLORIDE 10 MILLIGRAM(S): 5 TABLET ORAL at 06:37

## 2022-06-22 RX ADMIN — OXYCODONE HYDROCHLORIDE 10 MILLIGRAM(S): 5 TABLET ORAL at 02:06

## 2022-06-22 RX ADMIN — GABAPENTIN 400 MILLIGRAM(S): 400 CAPSULE ORAL at 14:31

## 2022-06-22 RX ADMIN — METHOCARBAMOL 500 MILLIGRAM(S): 500 TABLET, FILM COATED ORAL at 05:37

## 2022-06-22 RX ADMIN — Medication 30 MILLIGRAM(S): at 22:04

## 2022-06-22 RX ADMIN — OXYCODONE HYDROCHLORIDE 5 MILLIGRAM(S): 5 TABLET ORAL at 19:59

## 2022-06-22 RX ADMIN — OXYCODONE HYDROCHLORIDE 5 MILLIGRAM(S): 5 TABLET ORAL at 18:56

## 2022-06-22 RX ADMIN — Medication 50 MILLIGRAM(S): at 05:38

## 2022-06-22 RX ADMIN — Medication 650 MILLIGRAM(S): at 12:30

## 2022-06-22 RX ADMIN — SODIUM CHLORIDE 1000 MILLILITER(S): 9 INJECTION INTRAMUSCULAR; INTRAVENOUS; SUBCUTANEOUS at 09:58

## 2022-06-22 RX ADMIN — OXYCODONE HYDROCHLORIDE 10 MILLIGRAM(S): 5 TABLET ORAL at 03:06

## 2022-06-22 RX ADMIN — ENOXAPARIN SODIUM 40 MILLIGRAM(S): 100 INJECTION SUBCUTANEOUS at 21:26

## 2022-06-22 RX ADMIN — Medication 1000 MILLIGRAM(S): at 00:54

## 2022-06-22 RX ADMIN — Medication 5 MILLIGRAM(S): at 05:37

## 2022-06-22 RX ADMIN — Medication 1000 MILLIGRAM(S): at 01:54

## 2022-06-22 RX ADMIN — OXYCODONE HYDROCHLORIDE 10 MILLIGRAM(S): 5 TABLET ORAL at 07:37

## 2022-06-22 RX ADMIN — ONDANSETRON 4 MILLIGRAM(S): 8 TABLET, FILM COATED ORAL at 11:37

## 2022-06-22 RX ADMIN — Medication 650 MILLIGRAM(S): at 18:55

## 2022-06-22 RX ADMIN — Medication 30 MILLIGRAM(S): at 21:27

## 2022-06-22 RX ADMIN — Medication 1 TABLET(S): at 11:42

## 2022-06-22 RX ADMIN — Medication 30 MILLIGRAM(S): at 13:39

## 2022-06-22 RX ADMIN — SIMETHICONE 80 MILLIGRAM(S): 80 TABLET, CHEWABLE ORAL at 02:04

## 2022-06-22 RX ADMIN — OXYCODONE HYDROCHLORIDE 5 MILLIGRAM(S): 5 TABLET ORAL at 23:09

## 2022-06-22 RX ADMIN — OXYCODONE HYDROCHLORIDE 5 MILLIGRAM(S): 5 TABLET ORAL at 13:59

## 2022-06-22 RX ADMIN — Medication 650 MILLIGRAM(S): at 11:30

## 2022-06-22 NOTE — PHYSICAL THERAPY INITIAL EVALUATION ADULT - FOLLOWS COMMANDS/ANSWERS QUESTIONS, REHAB EVAL
Clinic hours for Dr. Flores:  Monday 8:20am - 4:30pm  Tuesday 8:20am - 4:30pm  Wednesday 8:20am - 4:30pm  Thursday 8:20am - 4:30pm  Friday           Not in    If you need a refill on your prescription please call your pharmacy and let them know. Please be proactive and call before your medication runs out. The pharmacy will then contact us for the refill. Please allow 24-48 hours for the refill to be processed.     If your physician has ordered additional laboratory or radiology testing the results will be discussed at your next visit. This will allow you the opportunity to go over the results in person with your provider. If your results require immediate intervention, you will be contacted sooner by phone call.     You may be receiving a patient satisfaction survey in the mail. Please take the time to complete, as your feedback is very important to us. We strive to make your experience exceptional and your comments help us with that goal. We look forward to hearing from you.       Increase Levothyroxine to 125 mcg daily take by itself in the morning.  Endocrine follow-up in April 2019.   Thyroid blood test to be repeated in 4 months and you will be called with test results and recommendations.  one week before follow-up appointment.  If heavy periods persist after 3 months then follow-up with gynecologist.    
100% of the time

## 2022-06-22 NOTE — OCCUPATIONAL THERAPY INITIAL EVALUATION ADULT - MODALITIES TREATMENT COMMENTS
Patient p/w orthostatic hypotension upon sitting at EOB, however no sit<>stand transfer and OOB activities performed today.

## 2022-06-22 NOTE — PHYSICAL THERAPY INITIAL EVALUATION ADULT - PERTINENT HX OF CURRENT PROBLEM, REHAB EVAL
37yo f, h/o herniated disk at L5/S1 w/ spinal stenosis, shown 2 weeks ago on MRI. Pt presented w/ gradual onset of R lower back pain radiating down right lower extremity since last month. Also complains of tingling sensation to R great toe and an episode of difficulty walking due to "loss of sensation to right leg" which spontaneously resolved.

## 2022-06-22 NOTE — CONSULT NOTE ADULT - SUBJECTIVE AND OBJECTIVE BOX
NEUROSURGERY PAIN MANAGEMENT CONSULT NOTE    Chief Complaint: low back pain radiating to RLE    HPI:  Pt is a 35yo f, h/o herniated disk at L5/S1 w/ spinal stenosis, shown 2 weeks ago on MRI. Pt presented w/ gradual onset of R lower back pain radiating down right lower extremity since last month. Also complains of tingling sensation to R great toe and an episode of difficulty walking due to "loss of sensation to right leg" which spontaneously resolved. Has been on gabapentin 300 TID and oxycodone with no improvement of symptoms. Pt is also breastfeeding and would wants to avoid oxycodone. Pt denies any episodes of urinary or fecal incontinence.  Plan for microdiscectomy OR  tomorrow w/ Dr. D'Amico for operative intervention.    (2022 16:47)      PAST MEDICAL & SURGICAL HISTORY:  Lumbar herniated disc          FAMILY HISTORY:      SOCIAL HISTORY:  [ ] Denies Smoking, Alcohol, or Drug Use    HOME MEDICATIONS:   Please refer to initial HNP    PAIN HOME MEDICATIONS:    Allergies    No Known Allergies    Intolerances        PAIN MEDICATIONS:  acetaminophen     Tablet .. 650 milliGRAM(s) Oral every 8 hours  acetaminophen 300 mG/butalbital 50 mG/ caffeine 40 mG 1 Capsule(s) Oral every 6 hours PRN  methocarbamol 500 milliGRAM(s) Oral every 8 hours  ondansetron   Disintegrating Tablet 4 milliGRAM(s) Oral every 6 hours  oxyCODONE    IR 5 milliGRAM(s) Oral every 4 hours PRN  oxyCODONE    IR 10 milliGRAM(s) Oral every 4 hours PRN  pregabalin 75 milliGRAM(s) Oral every 8 hours    Heme:  enoxaparin Injectable 40 milliGRAM(s) SubCutaneous every 24 hours    Antibiotics:    Cardiovascular:    GI:  bisacodyl 5 milliGRAM(s) Oral every 12 hours  polyethylene glycol 3350 17 Gram(s) Oral daily PRN  senna 2 Tablet(s) Oral at bedtime  simethicone 80 milliGRAM(s) Chew every 6 hours PRN    Endocrine:    All Other Medications:  artificial  tears Solution 1 Drop(s) Both EYES every 4 hours PRN  benzocaine 15 mG/menthol 3.6 mG Lozenge 1 Lozenge Oral every 3 hours PRN  influenza   Vaccine 0.5 milliLiter(s) IntraMuscular once  multivitamin 1 Tablet(s) Oral daily  sodium chloride 0.9%. 1000 milliLiter(s) IV Continuous <Continuous>  sodium chloride 0.9%. 1000 milliLiter(s) IV Continuous <Continuous>      Vital Signs Last 24 Hrs  T(C): 36.5 (2022 08:33), Max: 36.8 (2022 00:14)  T(F): 97.7 (2022 08:33), Max: 98.2 (2022 00:14)  HR: 82 (2022 09:21) (66 - 101)  BP: 107/72 (2022 09:21) (71/46 - 124/81)  BP(mean): 87 (2022 14:28) (74 - 88)  RR: 18 (2022 09:21) (10 - 20)  SpO2: 99% (2022 09:21) (97% - 100%)    LABS:                        11.1   10.48 )-----------( 326      ( 2022 05:02 )             34.5     06-22    141  |  108  |  9   ----------------------------<  108<H>  4.1   |  25  |  0.65    Ca    8.7      2022 05:02  Phos  3.6     06-22  Mg     1.9     06-22    TPro  7.6  /  Alb  4.6  /  TBili  0.2  /  DBili  x   /  AST  33  /  ALT  24  /  AlkPhos  72  06-20    PT/INR - ( 2022 08:38 )   PT: 11.4 sec;   INR: 0.96          PTT - ( 2022 08:38 )  PTT:33.2 sec  Urinalysis Basic - ( 2022 17:25 )    Color: Yellow / Appearance: Clear / S.020 / pH: x  Gluc: x / Ketone: NEGATIVE  / Bili: Negative / Urobili: 0.2 E.U./dL   Blood: x / Protein: NEGATIVE mg/dL / Nitrite: NEGATIVE   Leuk Esterase: NEGATIVE / RBC: x / WBC x   Sq Epi: x / Non Sq Epi: x / Bacteria: x        RADIOLOGY:    Drug Screen:        REVIEW OF SYSTEMS:  CONSTITUTIONAL: No fever or fatigue O/N.   EYES: No eye pain, visual disturbances  ENMT:  No difficulty hearing. No throat pain  NECK: No pain or stiffness  RESPIRATORY: No cough, wheezing; No shortness of breath  CARDIOVASCULAR: No chest pain, palpitations.   GASTROINTESTINAL: Pt reports passing gas. No bowel movements. No abdominal or epigastric pain. No nausea, vomiting. GENITOURINARY: No dysuria, frequency, or incontinence  NEUROLOGICAL: No headaches, loss of strength, numbness, or tremors. No dizzinesss or lightheadedness with pain medications.   MUSCULOSKELETAL: Incisional back pain. No joint pain or swelling; No muscle, or extremity pain    PAIN ASSESSMENT: RLE pain overall better, c/o worsened R foot numbness, c/o burning pain and tightness in lower back. Advised to pump and dump while on pain meds and few several days after off all pain meds     PHYSICAL EXAM  GENERAL: Laying in bed, NAD  Neuro: CN II-XII PERRRL, EOMI  Cranial nerves grossly intact  Motor exam: MAEx4  Sensation intact to LT in UE/LE in 3 dermatomes  CHEST/LUNG: Clear to auscultation bilaterally; No rales, rhonchi, wheezing, or rubs  HEART: Regular rate and rhythm; No murmurs, rubs, or gallops  ABDOMEN: Soft, Nontender, Nondistended; Bowel sounds present  EXTREMITIES:  2+ Peripheral Pulses, No clubbing, cyanosis, or edema  SKIN: No rashes or lesions      ASSESSMENT:   Pt is a 35yo f, h/o herniated disk at L5/S1 w/ spinal stenosis, shown 2 weeks ago on MRI. Pt presented w/ gradual onset of R lower back pain radiating down right lower extremity since last month. now s/p L5-S1 microdiscetomy, CSF leak repair (22)    PLAN:   - Pain:  Tylenol 650mg every 8 hours  Increase Lyrica to 75mg every 8 hours  Robaxin 500mg every 8 hours   Oxycodone 5/10mg Q4 hours PRN for mod/severe pain  Fioricet PRN for headache  Start Toradol 30mg IV every 8 hours x3 days    - Bowel regimen: Senna    - Nausea ppx: Zofran standing  - Functional Goals: Pt will get OOB with PT today. Pt will resume previous level of activity without impairment from surgery.   - Additional Consults: None recommended.   - Additional Labs/Imaging:  None recommended.   - Follow up, Discharge Planning: pending PT/OT  - Pain Management follow up plan: will cont to follow    d/w Dr. Dick    NEUROSURGERY PAIN MANAGEMENT CONSULT NOTE    Chief Complaint: low back pain radiating to RLE    HPI:  Pt is a 37yo f, h/o herniated disk at L5/S1 w/ spinal stenosis, shown 2 weeks ago on MRI. Pt presented w/ gradual onset of R lower back pain radiating down right lower extremity since last month. Also complains of tingling sensation to R great toe and an episode of difficulty walking due to "loss of sensation to right leg" which spontaneously resolved. Has been on gabapentin 300 TID and oxycodone with no improvement of symptoms. Pt is also breastfeeding and would wants to avoid oxycodone. Pt denies any episodes of urinary or fecal incontinence.  Plan for microdiscectomy OR  tomorrow w/ Dr. D'Amico for operative intervention.    (2022 16:47)      PAST MEDICAL & SURGICAL HISTORY:  Lumbar herniated disc          FAMILY HISTORY:      SOCIAL HISTORY:  [ ] Denies Smoking, Alcohol, or Drug Use    HOME MEDICATIONS:   Please refer to initial HNP    PAIN HOME MEDICATIONS:    Allergies    No Known Allergies    Intolerances        PAIN MEDICATIONS:  acetaminophen     Tablet .. 650 milliGRAM(s) Oral every 8 hours  acetaminophen 300 mG/butalbital 50 mG/ caffeine 40 mG 1 Capsule(s) Oral every 6 hours PRN  methocarbamol 500 milliGRAM(s) Oral every 8 hours  ondansetron   Disintegrating Tablet 4 milliGRAM(s) Oral every 6 hours  oxyCODONE    IR 5 milliGRAM(s) Oral every 4 hours PRN  oxyCODONE    IR 10 milliGRAM(s) Oral every 4 hours PRN  pregabalin 75 milliGRAM(s) Oral every 8 hours    Heme:  enoxaparin Injectable 40 milliGRAM(s) SubCutaneous every 24 hours    Antibiotics:    Cardiovascular:    GI:  bisacodyl 5 milliGRAM(s) Oral every 12 hours  polyethylene glycol 3350 17 Gram(s) Oral daily PRN  senna 2 Tablet(s) Oral at bedtime  simethicone 80 milliGRAM(s) Chew every 6 hours PRN    Endocrine:    All Other Medications:  artificial  tears Solution 1 Drop(s) Both EYES every 4 hours PRN  benzocaine 15 mG/menthol 3.6 mG Lozenge 1 Lozenge Oral every 3 hours PRN  influenza   Vaccine 0.5 milliLiter(s) IntraMuscular once  multivitamin 1 Tablet(s) Oral daily  sodium chloride 0.9%. 1000 milliLiter(s) IV Continuous <Continuous>  sodium chloride 0.9%. 1000 milliLiter(s) IV Continuous <Continuous>      Vital Signs Last 24 Hrs  T(C): 36.5 (2022 08:33), Max: 36.8 (2022 00:14)  T(F): 97.7 (2022 08:33), Max: 98.2 (2022 00:14)  HR: 82 (2022 09:21) (66 - 101)  BP: 107/72 (2022 09:21) (71/46 - 124/81)  BP(mean): 87 (2022 14:28) (74 - 88)  RR: 18 (2022 09:21) (10 - 20)  SpO2: 99% (2022 09:21) (97% - 100%)    LABS:                        11.1   10.48 )-----------( 326      ( 2022 05:02 )             34.5     06-22    141  |  108  |  9   ----------------------------<  108<H>  4.1   |  25  |  0.65    Ca    8.7      2022 05:02  Phos  3.6     06-22  Mg     1.9     06-22    TPro  7.6  /  Alb  4.6  /  TBili  0.2  /  DBili  x   /  AST  33  /  ALT  24  /  AlkPhos  72  06-20    PT/INR - ( 2022 08:38 )   PT: 11.4 sec;   INR: 0.96          PTT - ( 2022 08:38 )  PTT:33.2 sec  Urinalysis Basic - ( 2022 17:25 )    Color: Yellow / Appearance: Clear / S.020 / pH: x  Gluc: x / Ketone: NEGATIVE  / Bili: Negative / Urobili: 0.2 E.U./dL   Blood: x / Protein: NEGATIVE mg/dL / Nitrite: NEGATIVE   Leuk Esterase: NEGATIVE / RBC: x / WBC x   Sq Epi: x / Non Sq Epi: x / Bacteria: x        RADIOLOGY:    Drug Screen:        REVIEW OF SYSTEMS:  CONSTITUTIONAL: No fever or fatigue O/N.   EYES: No eye pain, visual disturbances  ENMT:  No difficulty hearing. No throat pain  NECK: No pain or stiffness  RESPIRATORY: No cough, wheezing; No shortness of breath  CARDIOVASCULAR: No chest pain, palpitations.   GASTROINTESTINAL: Pt reports passing gas. No bowel movements. No abdominal or epigastric pain. No nausea, vomiting. GENITOURINARY: No dysuria, frequency, or incontinence  NEUROLOGICAL: No headaches, loss of strength, numbness, or tremors. No dizzinesss or lightheadedness with pain medications.   MUSCULOSKELETAL: Incisional back pain. No joint pain or swelling; No muscle, or extremity pain    PAIN ASSESSMENT: RLE pain overall better, c/o worsened R foot numbness, c/o burning pain and tightness in lower back. Advised to pump and dump while on pain meds and few several days after off all pain meds     PHYSICAL EXAM  GENERAL: Laying in bed, NAD  Neuro: CN II-XII PERRRL, EOMI  Cranial nerves grossly intact  Motor exam: MAEx4  Sensation intact to LT in UE/LE in 3 dermatomes  CHEST/LUNG: Clear to auscultation bilaterally; No rales, rhonchi, wheezing, or rubs  HEART: Regular rate and rhythm; No murmurs, rubs, or gallops  ABDOMEN: Soft, Nontender, Nondistended; Bowel sounds present  EXTREMITIES:  2+ Peripheral Pulses, No clubbing, cyanosis, or edema  SKIN: No rashes or lesions      ASSESSMENT:   Pt is a 37yo f, h/o herniated disk at L5/S1 w/ spinal stenosis, shown 2 weeks ago on MRI. Pt presented w/ gradual onset of R lower back pain radiating down right lower extremity since last month. now s/p L5-S1 microdiscetomy, CSF leak repair (22)    PLAN:   - Pain:  Tylenol 650mg every 8 hours  Switch Lyrica to Gabapentin 400mg every 8 hours  Robaxin 500mg every 8 hours   Oxycodone 5/10mg Q4 hours PRN for mod/severe pain  Fioricet PRN for headache  Start Toradol 30mg IV every 8 hours x3 days    - Bowel regimen: Senna    - Nausea ppx: Zofran standing  - Functional Goals: Pt will get OOB with PT today. Pt will resume previous level of activity without impairment from surgery.   - Additional Consults: None recommended.   - Additional Labs/Imaging:  None recommended.   - Follow up, Discharge Planning: pending PT/OT  - Pain Management follow up plan: will cont to follow    d/w Dr. Dick

## 2022-06-22 NOTE — PROGRESS NOTE ADULT - SUBJECTIVE AND OBJECTIVE BOX
R foot is numb, and the sole of her foot is painful to the touch.  L foot is unaffected.  Her chronic sciatica pain is gone.  Slowly raising the head of her bed.  Passing gas, but unable to urinate.      Remaining ROS negative       PHYSICAL EXAM:    General: WDWN  HEENT: NC/AT; PERRL, anicteric sclera; MMM  Cardiovascular: +S1/S2, RRR  Respiratory: CTA B/L; no W/R/R  Gastrointestinal: soft, NT/ND; +BSx4  Extremities: WWP; no edema, clubbing or cyanosis  Neurological: alert and conversant; pins and needles/numbness/allodynia of R foot.  Able to move R and L foot equally  Psychiatric: pleasant mood and affect  Dermatologic: no appreciable wounds or damage to the skin    VITAL SIGNS:  Vital Signs Last 24 Hrs  T(C): 36.5 (2022 08:33), Max: 36.8 (2022 00:14)  T(F): 97.7 (2022 08:33), Max: 98.2 (2022 00:14)  HR: 82 (2022 09:21) (75 - 101)  BP: 107/72 (2022 09:21) (71/46 - 124/81)  BP(mean): 87 (2022 14:28) (86 - 87)  RR: 18 (2022 09:21) (14 - 20)  SpO2: 99% (2022 09:21) (97% - 100%)      MEDICATIONS:  MEDICATIONS  (STANDING):  acetaminophen     Tablet .. 650 milliGRAM(s) Oral every 8 hours  bisacodyl 5 milliGRAM(s) Oral every 12 hours  enoxaparin Injectable 40 milliGRAM(s) SubCutaneous every 24 hours  gabapentin 400 milliGRAM(s) Oral every 8 hours  influenza   Vaccine 0.5 milliLiter(s) IntraMuscular once  ketorolac   Injectable 30 milliGRAM(s) IV Push every 8 hours  methocarbamol 500 milliGRAM(s) Oral every 8 hours  multivitamin 1 Tablet(s) Oral daily  ondansetron   Disintegrating Tablet 4 milliGRAM(s) Oral every 6 hours  senna 2 Tablet(s) Oral at bedtime  sodium chloride 0.9%. 1000 milliLiter(s) (1000 mL/Hr) IV Continuous <Continuous>    MEDICATIONS  (PRN):  acetaminophen 300 mG/butalbital 50 mG/ caffeine 40 mG 1 Capsule(s) Oral every 6 hours PRN Headache  artificial  tears Solution 1 Drop(s) Both EYES every 4 hours PRN Dry Eyes  benzocaine 15 mG/menthol 3.6 mG Lozenge 1 Lozenge Oral every 3 hours PRN Sore Throat  oxyCODONE    IR 5 milliGRAM(s) Oral every 4 hours PRN Moderate Pain (4 - 6)  oxyCODONE    IR 10 milliGRAM(s) Oral every 4 hours PRN Severe Pain (7 - 10)  polyethylene glycol 3350 17 Gram(s) Oral daily PRN Constipation  simethicone 80 milliGRAM(s) Chew every 6 hours PRN Gas      ALLERGIES:  Allergies    No Known Allergies    Intolerances        LABS:                        11.1   10.48 )-----------( 326      ( 2022 05:02 )             34.5     06-22    141  |  108  |  9   ----------------------------<  108<H>  4.1   |  25  |  0.65    Ca    8.7      2022 05:02  Phos  3.6     06-22  Mg     1.9     06-22    TPro  7.6  /  Alb  4.6  /  TBili  0.2  /  DBili  x   /  AST  33  /  ALT  24  /  AlkPhos  72  06-20    PT/INR - ( 2022 08:38 )   PT: 11.4 sec;   INR: 0.96          PTT - ( 2022 08:38 )  PTT:33.2 sec  Urinalysis Basic - ( 2022 17:25 )    Color: Yellow / Appearance: Clear / S.020 / pH: x  Gluc: x / Ketone: NEGATIVE  / Bili: Negative / Urobili: 0.2 E.U./dL   Blood: x / Protein: NEGATIVE mg/dL / Nitrite: NEGATIVE   Leuk Esterase: NEGATIVE / RBC: x / WBC x   Sq Epi: x / Non Sq Epi: x / Bacteria: x      CAPILLARY BLOOD GLUCOSE          RADIOLOGY & ADDITIONAL TESTS: Reviewed.

## 2022-06-22 NOTE — PROGRESS NOTE ADULT - SUBJECTIVE AND OBJECTIVE BOX
HPI:  Pt is a 35yo f, h/o herniated disk at L5/S1 w/ spinal stenosis, shown 2 weeks ago on MRI. Pt presented w/ gradual onset of R lower back pain radiating down right lower extremity since last month. Also complains of tingling sensation to R great toe and an episode of difficulty walking due to "loss of sensation to right leg" which spontaneously resolved. Has been on gabapentin 300 TID and oxycodone with no improvement of symptoms. Pt is also breastfeeding and would wants to avoid oxycodone. Pt denies any episodes of urinary or fecal incontinence.  Plan for microdiscectomy OR  tomorrow w/ Dr. D'Amico for operative intervention.    (2022 16:47)    OVERNIGHT EVENTS: Pain controlled, complaining of mild right sided calf/foot paraesthesias. Retaining urine requiring straight cath x 2    HOSPITAL COURSE:  : NPO after midnight for OR   : POD0 L5-S1 microdiscectomy with intraop CSF leak and primary repair. Bedrest til AM. intermittently requiring straight cath, pain controlled  : POD1 L5-S1 microdisc, no issues overnight       Vital Signs Last 24 Hrs  T(C): 36.8 (2022 00:14), Max: 36.9 (2022 08:55)  T(F): 98.2 (2022 00:14), Max: 98.5 (2022 08:55)  HR: 85 (2022 00:14) (66 - 101)  BP: 118/73 (2022 00:14) (96/56 - 137/95)  BP(mean): 87 (2022 14:28) (68 - 88)  RR: 16 (2022 00:14) (10 - 20)  SpO2: 97% (2022 00:14) (96% - 100%)    I&O's Summary    2022 07:01  -  2022 07:00  --------------------------------------------------------  IN: 0 mL / OUT: 1000 mL / NET: -1000 mL    2022 07:01  -  2022 00:39  --------------------------------------------------------  IN: 1690 mL / OUT: 2650 mL / NET: -960 mL        PHYSICAL EXAM:  General: NAD, pt is comfortably sitting up in bed, on room air  HEENT: CN II-XII grossly intact, PERRL 3mm briskly reactive, EOMI b/l, face symmetric, tongue midline, neck FROM  Cardiovascular: RRR, normal S1 and S2   Respiratory: lungs CTAB, no wheezing, rhonchi, or crackles   GI: normoactive BS to auscultation, abd soft, NTND   Neuro: A&Ox3, No aphasia, speech clear, no dysmetria, no pronator drift. Follows commands.  ACUNA x4 spontaneously, 5/5 strength in all extremities throughout. SILT throughout, except decreased sensation to right lateral calf and foot   Extremities: distal pulses 2+ x4  Wound/incision: +midline lumbar incision with dressing C/D/I   Drain: none         TUBES/LINES:  [] Vickers  [] Lumbar Drain  [] Wound Drains  [] Others      DIET:  [] NPO  [X ] Mechanical  [] Tube feeds    LABS:                        11.6   7.81  )-----------( 343      ( 2022 08:38 )             37.1     06-21    141  |  105  |  11  ----------------------------<  99  4.2   |  26  |  0.65    Ca    9.4      2022 08:38  Phos  3.7     06-21  Mg     2.0     06-21    TPro  7.6  /  Alb  4.6  /  TBili  0.2  /  DBili  x   /  AST  33  /  ALT  24  /  AlkPhos  72  06-20    PT/INR - ( 2022 08:38 )   PT: 11.4 sec;   INR: 0.96          PTT - ( 2022 08:38 )  PTT:33.2 sec  Urinalysis Basic - ( 2022 17:25 )    Color: Yellow / Appearance: Clear / S.020 / pH: x  Gluc: x / Ketone: NEGATIVE  / Bili: Negative / Urobili: 0.2 E.U./dL   Blood: x / Protein: NEGATIVE mg/dL / Nitrite: NEGATIVE   Leuk Esterase: NEGATIVE / RBC: x / WBC x   Sq Epi: x / Non Sq Epi: x / Bacteria: x          CAPILLARY BLOOD GLUCOSE          Drug Levels: [] N/A    CSF Analysis: [] N/A      Allergies    No Known Allergies    Intolerances      MEDICATIONS:  Antibiotics:  ceFAZolin   IVPB 2000 milliGRAM(s) IV Intermittent every 8 hours    Neuro:  acetaminophen     Tablet .. 1000 milliGRAM(s) Oral every 8 hours  methocarbamol 500 milliGRAM(s) Oral every 8 hours  ondansetron   Disintegrating Tablet 4 milliGRAM(s) Oral every 6 hours  oxyCODONE    IR 5 milliGRAM(s) Oral every 4 hours PRN  pregabalin 50 milliGRAM(s) Oral three times a day    Anticoagulation:  enoxaparin Injectable 40 milliGRAM(s) SubCutaneous every 24 hours    OTHER:  artificial  tears Solution 1 Drop(s) Both EYES every 4 hours PRN  benzocaine 15 mG/menthol 3.6 mG Lozenge 1 Lozenge Oral every 3 hours PRN  bisacodyl 5 milliGRAM(s) Oral every 12 hours  influenza   Vaccine 0.5 milliLiter(s) IntraMuscular once  polyethylene glycol 3350 17 Gram(s) Oral daily PRN  senna 2 Tablet(s) Oral at bedtime    IVF:  multivitamin 1 Tablet(s) Oral daily  sodium chloride 0.9%. 1000 milliLiter(s) IV Continuous <Continuous>    CULTURES:  Culture Results:   No growth to date ( @ 17:25)    RADIOLOGY & ADDITIONAL TESTS:      ASSESSMENT:  Pt is a 35yo f, h/o herniated disk at L5/S1 w/ spinal stenosis, shown 2 weeks ago on MRI. Pt presented w/ gradual onset of R lower back pain radiating down right lower extremity since last month. now s/p L5-S1 microdiscetomy, CSF leak repair (22)      BACK PAIN    Handoff    MEWS Score    No pertinent past medical history    Lumbar herniated disc    Lumbar disc herniation with radiculopathy    Lumbar disc herniation with radiculopathy    Back pain    Lumbar herniated disc    Microdiscectomy, spine, lumbar, open    BACK PAIN    5    [email or web address]        PLAN:  NEURO:  - neuro checks q4h  - ERAS  - flat until  am due to CSF leak  - no post op imaging needed    CARDIO:  - normotensive BP goal    GI:  - regular diet   - bowel regimen prn    RENAL:  - IVL once tolerating PO  - Bladder scan q6  - Retaining urine requiring int straight cath     ENDO:  - no active issues     HEME:  - SCDs   - SQL to start      ID:   - afebrile   - perioperative ancef     Dispo: pending pt/ot deferred yesterday d/t bedrest     Assesment and plan discussed w/ Dr. Randy D'Amico     Assessment:  Present when checked    []  GCS  E   V  M     Heart Failure: []Acute, [] acute on chronic , []chronic  Heart Failure:  [] Diastolic (HFpEF), [] Systolic (HFrEF), []Combined (HFpEF and HFrEF), [] RHF, [] Pulm HTN, [] Other    [] CAMERON, [] ATN, [] AIN, [] other  [] CKD1, [] CKD2, [] CKD 3, [] CKD 4, [] CKD 5, []ESRD    Encephalopathy: [] Metabolic, [] Hepatic, [] toxic, [] Neurological, [] Other    Abnormal Nurtitional Status: [] malnurtition (see nutrition note), [ ]underweight: BMI < 19, [] morbid obesity: BMI >40, [] Cachexia    [] Sepsis  [] hypovolemic shock,[] cardiogenic shock, [] hemorrhagic shock, [] neuogenic shock  [] Acute Respiratory Failure  []Cerebral edema, [] Brain compression/ herniation,   [] Functional quadriplegia  [] Acute blood loss anemia

## 2022-06-22 NOTE — OCCUPATIONAL THERAPY INITIAL EVALUATION ADULT - ADDITIONAL COMMENTS
Patient reports she lives in a house +7-8STE, 1FOS (14-20steps) to second floor where her master bedroom and bathroom locate. Patient has a walk-in shower and a bathtub, does not own any DME. Patient reports to be independent in all ADLs and functional transfers prior to this hospitalization.

## 2022-06-22 NOTE — PROGRESS NOTE ADULT - ASSESSMENT
36F with PMH of spinal stenosis and herniated disk presenting with R lower back pain, and found to have a herniated disk at L5/S1.   S/p laminectomy, and likely discharge tomorrow.     #Herniated disk  #S/p laminectomy  - CSF leak reported, will need to lie flat on her back.  Able to slowly start raising head of bed throughout the day.   - pain management per primary team  - PT consult today - once able to be upright  - possibly home today    #urinary retention  - unable to void since surgery  - possibly related to pain medications and being immobilized for the past 24 hours.  Will monitor for full resolution and if passes trial of void, and cleared by neurosurgery can be discharged home

## 2022-06-22 NOTE — LACTATION INITIAL EVALUATION - NS LACT CON REASON FOR REQ
Request for lactation consultation:  Rounded on patient who is currently breastfeeding her 11 month infant.  Pt had general concerns and questions in regards to continued breastfeeding while on antibiotics and pain medications.  Instructed patient that current medications will not affect breast milk supply but she must pump to maintain her breast milk supply.  Pt has breastpump at bedside and she reported she knows how to use pump.  Pt has been pumping and dumping.  Instructed and reviewed pumping and hand expression regiment while in hospital to maintain her supply.  All questions and concerns addressed and Pt verbalized understanding of instructions./general questions without assessment

## 2022-06-22 NOTE — OCCUPATIONAL THERAPY INITIAL EVALUATION ADULT - HEALTH SCREEN CRITERIA
yes

## 2022-06-23 ENCOUNTER — TRANSCRIPTION ENCOUNTER (OUTPATIENT)
Age: 37
End: 2022-06-23

## 2022-06-23 VITALS
DIASTOLIC BLOOD PRESSURE: 74 MMHG | SYSTOLIC BLOOD PRESSURE: 105 MMHG | OXYGEN SATURATION: 97 % | RESPIRATION RATE: 16 BRPM | HEART RATE: 94 BPM | TEMPERATURE: 98 F

## 2022-06-23 DIAGNOSIS — M51.26 OTHER INTERVERTEBRAL DISC DISPLACEMENT, LUMBAR REGION: ICD-10-CM

## 2022-06-23 LAB
ANION GAP SERPL CALC-SCNC: 7 MMOL/L — SIGNIFICANT CHANGE UP (ref 5–17)
BUN SERPL-MCNC: 11 MG/DL — SIGNIFICANT CHANGE UP (ref 7–23)
CALCIUM SERPL-MCNC: 8.8 MG/DL — SIGNIFICANT CHANGE UP (ref 8.4–10.5)
CHLORIDE SERPL-SCNC: 107 MMOL/L — SIGNIFICANT CHANGE UP (ref 96–108)
CO2 SERPL-SCNC: 27 MMOL/L — SIGNIFICANT CHANGE UP (ref 22–31)
CREAT SERPL-MCNC: 0.71 MG/DL — SIGNIFICANT CHANGE UP (ref 0.5–1.3)
EGFR: 112 ML/MIN/1.73M2 — SIGNIFICANT CHANGE UP
GLUCOSE SERPL-MCNC: 108 MG/DL — HIGH (ref 70–99)
HCT VFR BLD CALC: 34.1 % — LOW (ref 34.5–45)
HGB BLD-MCNC: 10.6 G/DL — LOW (ref 11.5–15.5)
MAGNESIUM SERPL-MCNC: 1.8 MG/DL — SIGNIFICANT CHANGE UP (ref 1.6–2.6)
MCHC RBC-ENTMCNC: 26.9 PG — LOW (ref 27–34)
MCHC RBC-ENTMCNC: 31.1 GM/DL — LOW (ref 32–36)
MCV RBC AUTO: 86.5 FL — SIGNIFICANT CHANGE UP (ref 80–100)
NRBC # BLD: 0 /100 WBCS — SIGNIFICANT CHANGE UP (ref 0–0)
PHOSPHATE SERPL-MCNC: 3.2 MG/DL — SIGNIFICANT CHANGE UP (ref 2.5–4.5)
PLATELET # BLD AUTO: 302 K/UL — SIGNIFICANT CHANGE UP (ref 150–400)
POTASSIUM SERPL-MCNC: 4.2 MMOL/L — SIGNIFICANT CHANGE UP (ref 3.5–5.3)
POTASSIUM SERPL-SCNC: 4.2 MMOL/L — SIGNIFICANT CHANGE UP (ref 3.5–5.3)
RBC # BLD: 3.94 M/UL — SIGNIFICANT CHANGE UP (ref 3.8–5.2)
RBC # FLD: 12.4 % — SIGNIFICANT CHANGE UP (ref 10.3–14.5)
SODIUM SERPL-SCNC: 141 MMOL/L — SIGNIFICANT CHANGE UP (ref 135–145)
WBC # BLD: 7.36 K/UL — SIGNIFICANT CHANGE UP (ref 3.8–10.5)
WBC # FLD AUTO: 7.36 K/UL — SIGNIFICANT CHANGE UP (ref 3.8–10.5)

## 2022-06-23 PROCEDURE — 80053 COMPREHEN METABOLIC PANEL: CPT

## 2022-06-23 PROCEDURE — 81003 URINALYSIS AUTO W/O SCOPE: CPT

## 2022-06-23 PROCEDURE — 86850 RBC ANTIBODY SCREEN: CPT

## 2022-06-23 PROCEDURE — 86900 BLOOD TYPING SEROLOGIC ABO: CPT

## 2022-06-23 PROCEDURE — 76000 FLUOROSCOPY <1 HR PHYS/QHP: CPT

## 2022-06-23 PROCEDURE — 36415 COLL VENOUS BLD VENIPUNCTURE: CPT

## 2022-06-23 PROCEDURE — 85027 COMPLETE CBC AUTOMATED: CPT

## 2022-06-23 PROCEDURE — 85610 PROTHROMBIN TIME: CPT

## 2022-06-23 PROCEDURE — 96374 THER/PROPH/DIAG INJ IV PUSH: CPT

## 2022-06-23 PROCEDURE — 97116 GAIT TRAINING THERAPY: CPT

## 2022-06-23 PROCEDURE — 83735 ASSAY OF MAGNESIUM: CPT

## 2022-06-23 PROCEDURE — 80048 BASIC METABOLIC PNL TOTAL CA: CPT

## 2022-06-23 PROCEDURE — 99232 SBSQ HOSP IP/OBS MODERATE 35: CPT

## 2022-06-23 PROCEDURE — 99285 EMERGENCY DEPT VISIT HI MDM: CPT

## 2022-06-23 PROCEDURE — 84702 CHORIONIC GONADOTROPIN TEST: CPT

## 2022-06-23 PROCEDURE — 84100 ASSAY OF PHOSPHORUS: CPT

## 2022-06-23 PROCEDURE — 85730 THROMBOPLASTIN TIME PARTIAL: CPT

## 2022-06-23 PROCEDURE — 97110 THERAPEUTIC EXERCISES: CPT

## 2022-06-23 PROCEDURE — 71045 X-RAY EXAM CHEST 1 VIEW: CPT

## 2022-06-23 PROCEDURE — 88304 TISSUE EXAM BY PATHOLOGIST: CPT

## 2022-06-23 PROCEDURE — C1889: CPT

## 2022-06-23 PROCEDURE — 97535 SELF CARE MNGMENT TRAINING: CPT

## 2022-06-23 PROCEDURE — 87086 URINE CULTURE/COLONY COUNT: CPT

## 2022-06-23 PROCEDURE — 97161 PT EVAL LOW COMPLEX 20 MIN: CPT

## 2022-06-23 PROCEDURE — 87635 SARS-COV-2 COVID-19 AMP PRB: CPT

## 2022-06-23 PROCEDURE — 86901 BLOOD TYPING SEROLOGIC RH(D): CPT

## 2022-06-23 RX ORDER — METHOCARBAMOL 500 MG/1
1 TABLET, FILM COATED ORAL
Qty: 21 | Refills: 0
Start: 2022-06-23 | End: 2022-06-29

## 2022-06-23 RX ORDER — SODIUM CHLORIDE 9 MG/ML
1000 INJECTION INTRAMUSCULAR; INTRAVENOUS; SUBCUTANEOUS ONCE
Refills: 0 | Status: COMPLETED | OUTPATIENT
Start: 2022-06-23 | End: 2022-06-23

## 2022-06-23 RX ORDER — ACETAMINOPHEN 500 MG
2 TABLET ORAL
Qty: 0 | Refills: 0 | DISCHARGE
Start: 2022-06-23

## 2022-06-23 RX ORDER — MAGNESIUM SULFATE 500 MG/ML
2 VIAL (ML) INJECTION ONCE
Refills: 0 | Status: DISCONTINUED | OUTPATIENT
Start: 2022-06-23 | End: 2022-06-23

## 2022-06-23 RX ORDER — OXYCODONE HYDROCHLORIDE 5 MG/1
1 TABLET ORAL
Qty: 30 | Refills: 0
Start: 2022-06-23 | End: 2022-06-27

## 2022-06-23 RX ORDER — METHOCARBAMOL 500 MG/1
1 TABLET, FILM COATED ORAL
Qty: 0 | Refills: 0 | DISCHARGE
Start: 2022-06-23 | End: 2022-06-29

## 2022-06-23 RX ORDER — GABAPENTIN 400 MG/1
1 CAPSULE ORAL
Qty: 21 | Refills: 0
Start: 2022-06-23 | End: 2022-06-29

## 2022-06-23 RX ORDER — SODIUM CHLORIDE 9 MG/ML
1000 INJECTION INTRAMUSCULAR; INTRAVENOUS; SUBCUTANEOUS
Refills: 0 | Status: DISCONTINUED | OUTPATIENT
Start: 2022-06-23 | End: 2022-06-23

## 2022-06-23 RX ORDER — TRAMADOL HYDROCHLORIDE 50 MG/1
25 TABLET ORAL ONCE
Refills: 0 | Status: DISCONTINUED | OUTPATIENT
Start: 2022-06-23 | End: 2022-06-23

## 2022-06-23 RX ADMIN — OXYCODONE HYDROCHLORIDE 5 MILLIGRAM(S): 5 TABLET ORAL at 11:56

## 2022-06-23 RX ADMIN — METHOCARBAMOL 500 MILLIGRAM(S): 500 TABLET, FILM COATED ORAL at 05:27

## 2022-06-23 RX ADMIN — Medication 30 MILLIGRAM(S): at 06:00

## 2022-06-23 RX ADMIN — Medication 30 MILLIGRAM(S): at 13:03

## 2022-06-23 RX ADMIN — GABAPENTIN 400 MILLIGRAM(S): 400 CAPSULE ORAL at 13:03

## 2022-06-23 RX ADMIN — SODIUM CHLORIDE 1000 MILLILITER(S): 9 INJECTION INTRAMUSCULAR; INTRAVENOUS; SUBCUTANEOUS at 10:35

## 2022-06-23 RX ADMIN — Medication 650 MILLIGRAM(S): at 03:00

## 2022-06-23 RX ADMIN — OXYCODONE HYDROCHLORIDE 5 MILLIGRAM(S): 5 TABLET ORAL at 03:00

## 2022-06-23 RX ADMIN — OXYCODONE HYDROCHLORIDE 5 MILLIGRAM(S): 5 TABLET ORAL at 04:00

## 2022-06-23 RX ADMIN — METHOCARBAMOL 500 MILLIGRAM(S): 500 TABLET, FILM COATED ORAL at 13:03

## 2022-06-23 RX ADMIN — Medication 650 MILLIGRAM(S): at 11:31

## 2022-06-23 RX ADMIN — Medication 1 TABLET(S): at 11:57

## 2022-06-23 RX ADMIN — GABAPENTIN 400 MILLIGRAM(S): 400 CAPSULE ORAL at 05:27

## 2022-06-23 RX ADMIN — Medication 30 MILLIGRAM(S): at 05:27

## 2022-06-23 RX ADMIN — Medication 650 MILLIGRAM(S): at 19:30

## 2022-06-23 RX ADMIN — Medication 5 MILLIGRAM(S): at 11:57

## 2022-06-23 RX ADMIN — Medication 650 MILLIGRAM(S): at 10:31

## 2022-06-23 RX ADMIN — OXYCODONE HYDROCHLORIDE 5 MILLIGRAM(S): 5 TABLET ORAL at 00:09

## 2022-06-23 RX ADMIN — Medication 30 MILLIGRAM(S): at 13:18

## 2022-06-23 RX ADMIN — Medication 650 MILLIGRAM(S): at 19:00

## 2022-06-23 RX ADMIN — ONDANSETRON 4 MILLIGRAM(S): 8 TABLET, FILM COATED ORAL at 13:03

## 2022-06-23 RX ADMIN — OXYCODONE HYDROCHLORIDE 5 MILLIGRAM(S): 5 TABLET ORAL at 12:56

## 2022-06-23 RX ADMIN — SODIUM CHLORIDE 1000 MILLILITER(S): 9 INJECTION INTRAMUSCULAR; INTRAVENOUS; SUBCUTANEOUS at 03:28

## 2022-06-23 RX ADMIN — Medication 650 MILLIGRAM(S): at 04:00

## 2022-06-23 NOTE — DIETITIAN INITIAL EVALUATION ADULT - PERTINENT MEDS FT
MEDICATIONS  (STANDING):  acetaminophen     Tablet .. 650 milliGRAM(s) Oral every 8 hours  bisacodyl 5 milliGRAM(s) Oral every 12 hours  enoxaparin Injectable 40 milliGRAM(s) SubCutaneous every 24 hours  gabapentin 400 milliGRAM(s) Oral every 8 hours  influenza   Vaccine 0.5 milliLiter(s) IntraMuscular once  ketorolac   Injectable 30 milliGRAM(s) IV Push every 8 hours  magnesium sulfate  IVPB 2 Gram(s) IV Intermittent once  methocarbamol 500 milliGRAM(s) Oral every 8 hours  multivitamin 1 Tablet(s) Oral daily  ondansetron   Disintegrating Tablet 4 milliGRAM(s) Oral every 6 hours  senna 2 Tablet(s) Oral at bedtime    MEDICATIONS  (PRN):  acetaminophen 300 mG/butalbital 50 mG/ caffeine 40 mG 1 Capsule(s) Oral every 6 hours PRN Headache  artificial  tears Solution 1 Drop(s) Both EYES every 4 hours PRN Dry Eyes  benzocaine 15 mG/menthol 3.6 mG Lozenge 1 Lozenge Oral every 3 hours PRN Sore Throat  oxyCODONE    IR 5 milliGRAM(s) Oral every 4 hours PRN Moderate Pain (4 - 6)  oxyCODONE    IR 10 milliGRAM(s) Oral every 4 hours PRN Severe Pain (7 - 10)  polyethylene glycol 3350 17 Gram(s) Oral daily PRN Constipation  simethicone 80 milliGRAM(s) Chew every 6 hours PRN Gas

## 2022-06-23 NOTE — PROGRESS NOTE ADULT - ASSESSMENT
36F with PMH of spinal stenosis and herniated disk presenting with R lower back pain, and found to have a herniated disk at L5/S1.   S/p laminectomy, and likely discharge tomorrow.     #Herniated disk  #S/p laminectomy  - CSF leak reported, will need to lie flat on her back.  Able to slowly start raising head of bed throughout the day.   - pain management per primary team  - orthostatic today, encourage PO hydration.  Fluid bolus.     #urinary retention  - unable to void since surgery due to lack of urge, cherry catheter placed but now patient is reporting some sensation so will attempt trial of void this afternoon and possibly home later today

## 2022-06-23 NOTE — DISCHARGE NOTE NURSING/CASE MANAGEMENT/SOCIAL WORK - PATIENT PORTAL LINK FT
You can access the FollowMyHealth Patient Portal offered by [facility] by registering at the following website: [email or web address]. By joining [facility]’s FollowMyHealth portal, you will also be able to view your health information using other applications (apps) compatible with our system.

## 2022-06-23 NOTE — PROGRESS NOTE ADULT - REASON FOR ADMISSION
L5/S1 herniated disc w/ spinal stenosis
back pain
L5/S1 herniated disc w/ spinal stenosis

## 2022-06-23 NOTE — DISCHARGE NOTE PROVIDER - NSDCCPCAREPLAN_GEN_ALL_CORE_FT
PRINCIPAL DISCHARGE DIAGNOSIS  Diagnosis: Lumbar herniated disc  Assessment and Plan of Treatment:       SECONDARY DISCHARGE DIAGNOSES  Diagnosis: Back pain  Assessment and Plan of Treatment:     Diagnosis: Lumbar herniated disc  Assessment and Plan of Treatment:

## 2022-06-23 NOTE — DISCHARGE NOTE PROVIDER - NSDCFUADDINST_GEN_ALL_CORE_FT
Neurosurgery follow up appointment date/time:  - Sutures will be removed at follow up appointment.   - please call the office to confirm appointment: [phone]    Wound Care:  - Patient can shower, pat incision to dry do not scrub  - Keep incision open to air   - No bathing    Activity:  - fatigue is common after surgery, rest if you feel tired   - no bending, lifting, twisting or heavy lifting   - walking is recommended, ambulate as tolerated  - you may shower when you get home, keep your incision dry  - no bathing   - no driving within 24 hours of anesthesia or while taking prescription pain medications   - keep hydrated, drink plenty of water     Please also follow up with your primary care doctor.     Pain Expectations:  - pain after surgery is expected  - please take pain meds as prescribed     Medications:  - No changes to home medications:  Continue taking prenatal vitamins     - pain meds :  Continue taking Tylenol over the counter for pain. Continue taking Gabapentin 300 every 8 hours for neuropathic pain. Continue taking oxycodone 5 mg or 10 mg for breakthrough pain only. Continue taking Robaxin 500 mg every 8 hours for muscle spasm. (confirm with your pediatrician prior to taking these medications!)    - pain medications can cause constipation, you should eat a high fiber diet and may take a stool softener while on pain meds   - Avoid taking Advil (ibuprofen), Motrin (naproxen), or Aspirin for pain as they can cause bleeding     Call the office or come to ED if:  - wound has drainage or bleeding, increased redness or pain at incision site, neurological change, fever (>101), chills, night sweats, syncope, nausea/vomiting      Playback:  - discharge instruction are on playback     WITHIN 24 HOURS OF DISCHARGE, PLEASE CONTACT NEURO PA  WITH ANY QUESTIONS OR CONCERNS: [phone]   OTHERWISE, PLEASE CALL THE OFFICE WITH ANY QUESTIONS OR CONCERNS:

## 2022-06-23 NOTE — DISCHARGE NOTE PROVIDER - NSDCMRMEDTOKEN_GEN_ALL_CORE_FT
gabapentin 300 mg oral tablet: 300 milligram(s) orally 3 times a day  Prenatal 1 oral capsule: 1 tab(s) orally once a day   acetaminophen 325 mg oral tablet: 2 tab(s) orally every 8 hours  bisacodyl 5 mg oral delayed release tablet: 1 tab(s) orally every 12 hours  butalbital/acetaminophen/caffeine 50 mg-300 mg-40 mg oral capsule: 1 cap(s) orally every 6 hours, As Needed -Headache - for heartburn - for headache MDD:Max 4 capsules per day   methocarbamol 500 mg oral tablet: 1 tab(s) orally every 8 hours MDD:Max 3 times a day.  Multiple Vitamins oral tablet: 1 tab(s) orally once a day  Neurontin 400 mg oral capsule: 1 cap(s) orally 3 times a day MDD:Max 3 tablets per day. Wean as tolerated.  oxyCODONE 5 mg oral tablet: 1 tab(s) orally every 4 hours, As needed, Moderate Pain (4 - 6) MDD:Take 1-2 tablets every 4 hours for moderate to severe pain. Max take 12 tablets per day.  Prenatal 1 oral capsule: 1 tab(s) orally once a day

## 2022-06-23 NOTE — DISCHARGE NOTE PROVIDER - CARE PROVIDER_API CALL
DAmico, Randy S (MD)  Neurosurgery  [address] [place] [address]  Phone: [phone]  Fax: [phone]  Follow Up Time:     Deepak Dick)  Anesthesiology; Pain Medicine  [address] [place] [address]  Phone: [phone]  Fax: [phone]  Follow Up Time:

## 2022-06-23 NOTE — DIETITIAN INITIAL EVALUATION ADULT - OTHER INFO
35yo f, h/o herniated disk at L5/S1 w/ spinal stenosis, shown 2 weeks ago on MRI. Pt presented w/ gradual onset of R lower back pain radiating down right lower extremity since last month. now s/p L5-S1 microdiscectomy, CSF leak repair (6/21/22)    Pt seen in room, resting in bed. Pt reports good appetite PTA and currently, on regular diet consuming >75% of meals. Denies N/V at present reports constipation last BM 6/20. On miralax, senna. Pt reports wt stability at current wt 212lbs, denies wt changes. No edema noted. Pain managed at present. Please see full nutrition recs below. Will continue to follow per RD protocol.

## 2022-06-23 NOTE — DISCHARGE NOTE PROVIDER - NSDCFUADDAPPT_GEN_ALL_CORE_FT
Please schedule a follow up appointment with Dr. D'Amico in 1-2 weeks by calling the office number [phone].     Please follow up with Dr. Dick for pain management.     Please follow up with your pediatrician/lactation specialist to confirm pain medications while lactating.     Please follow up with your primary care provider.

## 2022-06-23 NOTE — DIETITIAN INITIAL EVALUATION ADULT - PERTINENT LABORATORY DATA
06-23    141  |  107  |  11  ----------------------------<  108<H>  4.2   |  27  |  0.71    Ca    8.8      23 Jun 2022 05:05  Phos  3.2     06-23  Mg     1.8     06-23

## 2022-06-23 NOTE — DISCHARGE NOTE PROVIDER - NSDCCPTREATMENT_GEN_ALL_CORE_FT
PRINCIPAL PROCEDURE  Procedure: Microdiscectomy, spine, lumbar, open  Findings and Treatment: L5-S1

## 2022-06-23 NOTE — PROGRESS NOTE ADULT - SUBJECTIVE AND OBJECTIVE BOX
Had cherry catheter put in.  Feeling a little dizzy.  R foot still numb    VS and labs reviewed.     General: WDWN  HEENT: NC/AT; PERRL, anicteric sclera; MMM  Cardiovascular: +S1/S2, RRR  Respiratory: CTA B/L; no W/R/R  Gastrointestinal: soft, NT/ND; +BSx4  Extremities: WWP; no edema, clubbing or cyanosis  Neurological: alert and conversant; pins and needles/numbness/allodynia of R foot.  Able to move R and L foot equally  Psychiatric: pleasant mood and affect  Dermatologic: no appreciable wounds or damage to the skin

## 2022-06-23 NOTE — DISCHARGE NOTE NURSING/CASE MANAGEMENT/SOCIAL WORK - NSDCPEFALRISK_GEN_ALL_CORE
For information on Fall & Injury Prevention, visit: [email or web address] OR  [email or web address] OR  https://www.cdc.gov/steadi/patient.html

## 2022-06-23 NOTE — DIETITIAN INITIAL EVALUATION ADULT - ADD RECOMMEND
1. Continue regular diet, honor food preferences  2. Pain and bowel regimen per team  3. Monitor lytes and replete  4. RD to remain available prn

## 2022-06-23 NOTE — DISCHARGE NOTE PROVIDER - HOSPITAL COURSE
HPI:  Hospital Course:    Patient evaluated by PT/OT who recommened: Home with rolling walker  Patient is going home? rehab? hospice? Facility Name:     Hospital course c/b:     Exam on day of discharge:    Checklist:   - Obtained follow up appointment from NP  - Reviewed final recommendations of inpatient consults  - review discharge planning on provider handoff  - post op imaging completed  - Neurologically stable for discharge  - Vitals stable for discharge   - Afebrile for discharge  - WBC is stable  - Sodium level is normal  - Pain is adequately controlled  - Pt has PICC/walker/brace/collar        HPI: Pt is a 37yo f, h/o herniated disk at L5/S1 w/ spinal stenosis, shown 2 weeks ago on MRI. Pt presented w/ gradual onset of R lower back pain radiating down right lower extremity since last month. Also complains of tingling sensation to R great toe and an episode of difficulty walking due to "loss of sensation to right leg" which spontaneously resolved. Has been on gabapentin 300 TID and oxycodone with no improvement of symptoms. Pt is also breastfeeding and would wants to avoid oxycodone. Pt denies any episodes of urinary or fecal incontinence.  Plan for microdiscectomy OR  tomorrow w/ Dr. D'Amico for operative intervention.     Hospital Course:  6/20: NPO after midnight for OR   6/21: POD0 L5-S1 microdiscectomy with intraop CSF leak and primary repair. Bedrest til AM. intermittently requiring straight cath, pain controlled  6/22: POD1 L5-S1 microdisc, no issues overnight, given 1 L bolus for hypotension, continues to have retention and requiring straight cath. Patient is voiding and was seen by PT/OT.   6/23: POD2. Patient is voiding and was seen by PT/OT cleared for home with rolling walker.     Patient evaluated by PT/OT who recommened: Home with rolling walker    Hospital course c/b: Retention treated with cherry catheter, since has been removed and voiding    Exam on day of discharge:  General: patient seen laying supine in bed in NAD  Neuro: AAOx3, FC, OE spontaneously, speech clear and fluent, CNII-XI grossly intact, face symmetric, no pronator drift, finger to nose intact, strength 5/5 b/l UE and LE, sensation intact to light touch throughout  HEENT: PERRL, EOMI  Neck: supple  Cardiac: RRR, S1S2  Pulmonary: chest rise symmetric  Abdomen: soft, nontender, nondistended  Ext: perfusing well  Skin: warm, dry  Wound: Lumbar wound c/d/i     Checklist:  No post op imaging needed, vitals stable. Neurologically stable, WBC stable, afebrile. Pain is adequately controlled. Pt will be discharged with a script for a rolling walker.

## 2022-06-28 ENCOUNTER — NON-APPOINTMENT (OUTPATIENT)
Age: 37
End: 2022-06-28

## 2022-06-28 ENCOUNTER — APPOINTMENT (OUTPATIENT)
Dept: NEUROSURGERY | Facility: CLINIC | Age: 37
End: 2022-06-28
Payer: COMMERCIAL

## 2022-06-28 DIAGNOSIS — R50.9 FEVER, UNSPECIFIED: ICD-10-CM

## 2022-06-28 DIAGNOSIS — R11.0 NAUSEA: ICD-10-CM

## 2022-06-28 DIAGNOSIS — R52 PAIN, UNSPECIFIED: ICD-10-CM

## 2022-06-28 PROCEDURE — 99024 POSTOP FOLLOW-UP VISIT: CPT

## 2022-06-28 RX ORDER — METHOCARBAMOL 500 MG/1
500 TABLET, FILM COATED ORAL
Qty: 21 | Refills: 0 | Status: ACTIVE | COMMUNITY
Start: 2022-06-23

## 2022-06-28 RX ORDER — METHYLPREDNISOLONE 4 MG/1
4 TABLET ORAL
Qty: 21 | Refills: 0 | Status: DISCONTINUED | COMMUNITY
Start: 2022-04-07

## 2022-06-28 NOTE — PHYSICAL EXAM
[General Appearance - Alert] : alert [General Appearance - In No Acute Distress] : in no acute distress [Oriented To Time, Place, And Person] : oriented to person, place, and time [Impaired Insight] : insight and judgment were intact [Affect] : the affect was normal [Memory Recent] : recent memory was not impaired [Person] : oriented to person [Place] : oriented to place [Time] : oriented to time [Short Term Intact] : short term memory intact [Cranial Nerves Optic (II)] : visual acuity intact bilaterally,  pupils equal round and reactive to light [Cranial Nerves Oculomotor (III)] : extraocular motion intact [Cranial Nerves Trigeminal (V)] : facial sensation intact symmetrically [Cranial Nerves Facial (VII)] : face symmetrical [Cranial Nerves Vestibulocochlear (VIII)] : hearing was intact bilaterally [Cranial Nerves Glossopharyngeal (IX)] : tongue and palate midline [Cranial Nerves Accessory (XI - Cranial And Spinal)] : head turning and shoulder shrug symmetric [Cranial Nerves Hypoglossal (XII)] : there was no tongue deviation with protrusion [Motor Strength] : muscle strength was normal in all four extremities [Sensation Tactile Decrease] : light touch was intact [Balance] : balance was intact [Sclera] : the sclera and conjunctiva were normal [PERRL With Normal Accommodation] : pupils were equal in size, round, reactive to light, with normal accommodation [Hearing Threshold Finger Rub Not [place]] : hearing was normal [Neck Appearance] : the appearance of the neck was normal [Respiration, Rhythm And Depth] : normal respiratory rhythm and effort [Heart Rate And Rhythm] : heart rate was normal and rhythm regular [Abnormal Walk] : normal gait [Skin Color & Pigmentation] : normal skin color and pigmentation [Clean] : clean [Dry] : dry [Healing Well] : healing well [No Drainage] : without drainage [Normal Skin] : normal [5] : S1 ankle flexors 5/5 [Full ROM] : full ROM [No Pain with ROM] : no pain with motion in any direction [Examination Of The Oral Cavity] : the lips and gums were normal [Neck Cervical Mass (___cm)] : no neck mass was observed [Auscultation Breath Sounds / Voice Sounds] : lungs were clear to auscultation bilaterally [Apical Impulse] : the apical impulse was normal [Heart Sounds] : normal S1 and S2 [Edema] : there was no peripheral edema [No CVA Tenderness] : no ~M costovertebral angle tenderness [No Spinal Tenderness] : no spinal tenderness [Normal] : normal [Intact] : all motor groups within normal limits of strength and tone bilaterally [Full Visual Field] : full visual field [Outer Ear] : the ears and nose were normal in appearance [Motor Tone] : muscle strength and tone were normal [Skin Turgor] : normal skin turgor [] : no rash [Skin Lesions] : no skin lesions [Erythema] : not erythematous [Tender] : not tender [Warm] : not warm [Brock] : Brock's sign was not demonstrated [de-identified] : Positive Tinel sign bilaterally\par Phalen test positive \par

## 2022-06-28 NOTE — REASON FOR VISIT
[Spouse] : spouse [de-identified] : L5-S1 laminectomy, microdiskectomy  [de-identified] : 6/20/22

## 2022-06-28 NOTE — REVIEW OF SYSTEMS
[Fever] : fever [Chills] : chills [Feeling Poorly] : feeling poorly [Numbness] : numbness [Tingling] : tingling [Sore Throat] : sore throat [As Noted in HPI] : as noted in HPI [Confused or Disoriented] : no confusion [Memory Lapses or Loss] : no memory loss [Decr. Concentrating Ability] : no decrease in concentrating ability [Leg Weakness] : no leg weakness [Seizures] : no convulsions [Dizziness] : no dizziness [Difficulty Walking] : no difficulty walking [Eyesight Problems] : no eyesight problems [Chest Pain] : no chest pain [Palpitations] : no palpitations [Shortness Of Breath] : no shortness of breath [Cough] : no cough [Incontinence] : no incontinence

## 2022-06-28 NOTE — ASSESSMENT
[FreeTextEntry1] : Pt is a 35 y/o female with no PMHx who presented to [facility] ER 6/15/22 with 3 months of progressively worsening posterolateral Right leg pain, tingling. She completed MRI outpatient 6/9/22 which showed central disc protrusions resulting in severe spinal canal stenosis @ L5-S1, moderate spinal canal stenosis at L4-5.  \par She underwent L5-S1 laminectomy, microdiskectomy with intraop CSF leak repair 6/21/22 and was dc home 6/23/22. She initially reported improved pain post op but presented today with c/o fever, chills, body aches, sore throat x 3 days. Tmax 101.5 yesterday, afebrile today, last antipyretic last 12 hours ago. Denies known sick contact, headaches, new/worsening focal neuro deficits. Also with c/o bilateral wrist and thumb pain x 4 days with intermittent shock sensations and numbness, aggravated by using walker or pressing on wrist. \par Neuro intact. Bilateral upper/lower extremity strength 5/5. Bilateral Tinel and Phalen sign positive. \par Spinal incision is clean, dry, wound edges well approximated, without redness, drainage, swelling at site. \par Patient ambulates with steady gait unassisted on exam. \par \par Recommended she stop using walker. Prn tylenol for pain. \par RX provided for COVID PCR, CBC w/ diff, ESR, CRP labs, to be completed today. Pt assisted to lab. Will call patient once labs resulted.\par \par Educated to continue to monitor temperature. Monitor incision and report all S/S infection including drainage, redness, warmth to touch of incision, chills. \par \par Patient has f/u with Dr. D'Amico scheduled for next week. Plan to re-evaluate progress at next visit, consider hand PT if persistent wrist pain. \par \par Patient verbalizes understanding of today’s discussion and next steps in treatment plan. \par  \par \par

## 2022-06-28 NOTE — HISTORY OF PRESENT ILLNESS
[FreeTextEntry1] : 35 y/o female with no PMHx, never smoker, who presented to [facility] ER 6/15/22 with 3 months of progressively worsening posterolateral Right leg pain, tingling. She completed MRI outpatient 6/9/22 which showed central disc protrusions resulting in severe spinal canal stenosis @ L5-S1, moderate spinal canal stenosis at L4-5. Strength intact on exam. PVR 9. She was dc with prn percocet. \par \par 6/17/22 pt presented as hospital follow- up: \par She states 3 months of progressively worsening right lower back pain radiating down right buttock to posterolateral thigh with numbness and tingling. Right buttock pain and numbness is constant with intermittent pains that originates in right lower back and radiate down to right knee that are sharp, shooting, burning. States will occasionally go below right knee down calf and to right foot. She is unaware if top of bottom of foot. \par She saw her PCP for pain in April and was ordered medrol dose pack and PT. States minimal relief while on steroids that subsided after completion of course. Denies relief with PT. She then returned and was started on gabapentin, tylenol and ordered for MRI. She denied relief with gabapentin. She saw orthopedic surgeon with MRI and was offered surgery. She was pending second opinion but states pain severe for which she went to [facility] ER. \par States pain limits her from sitting, lying, states sleep severely impaired given pain. States she feels right foot is weak, numb, more noticeable when ambulating. Denies bowel/ bladder incontinence; however, states that over the past 2 days she feels trouble initiating urine when she feels the urge to void. \par Pt recommended surgery. \par \par 6/20/22 pt returned to ER with worsening pain. She underwent L5-S1 laminectomy, microdiskectomy with intraop CSF leak repair 6/21/22. Pt dc home 6/23/22. \par DC meds: methocarbamol 500 mg q8 hr, multivitamin, neurontin 400 mg TID, prenatal. Firoicet prn, oxycodone 5 mg orn \par \par TODAY pt presents with c/o fever/ chills, body aches x 3 days, also bilateral wrist and hand pain x 4 days. \par States fever last night of 101.5 max that broke overnight with tylenol. Today afebrile 98.6 and last tylenol 9pm yesterday. She notes sore throat. Denies cough. Denies known sick contacts. Denies headaches. \par States bilateral wrist pain and thumb pain x 4 days, aggravated by touching/ gripping her wrists and when using walker. States intermittent numbness to to thumb. Denies upper extremity weakness. States intermittent shock like sensations to wrist and thumb that interfere with her holding cups. \par States back pain and radicular pain has resolved. States right thigh numbness has resolved, has top of right foot numbness since surgery that is unchanged. Denies lower extremity weakness, bowel/ bladder incontinence or retention. \par Denies signs of any postop wound infection which could include but not limited to redness, swelling, purulent drainage. Denies chest pain, shortness of breath, unilateral leg edema. \par

## 2022-06-29 LAB
BASOPHILS # BLD AUTO: 0.04 K/UL
BASOPHILS NFR BLD AUTO: 0.5 %
CRP SERPL-MCNC: 283 MG/L
EOSINOPHIL # BLD AUTO: 0.21 K/UL
EOSINOPHIL NFR BLD AUTO: 2.5 %
ERYTHROCYTE [SEDIMENTATION RATE] IN BLOOD BY WESTERGREN METHOD: 120 MM/HR
HCT VFR BLD CALC: 35.3 %
HGB BLD-MCNC: 11.2 G/DL
IMM GRANULOCYTES NFR BLD AUTO: 0.2 %
LYMPHOCYTES # BLD AUTO: 1.48 K/UL
LYMPHOCYTES NFR BLD AUTO: 17.9 %
MAN DIFF?: NORMAL
MCHC RBC-ENTMCNC: 27.5 PG
MCHC RBC-ENTMCNC: 31.7 GM/DL
MCV RBC AUTO: 86.5 FL
MONOCYTES # BLD AUTO: 0.51 K/UL
MONOCYTES NFR BLD AUTO: 6.2 %
NEUTROPHILS # BLD AUTO: 6.02 K/UL
NEUTROPHILS NFR BLD AUTO: 72.7 %
PLATELET # BLD AUTO: 445 K/UL
RBC # BLD: 4.08 M/UL
RBC # FLD: 12.2 %
SARS-COV-2 N GENE NPH QL NAA+PROBE: NOT DETECTED
SURGICAL PATHOLOGY STUDY: SIGNIFICANT CHANGE UP
WBC # FLD AUTO: 8.28 K/UL

## 2022-07-06 ENCOUNTER — APPOINTMENT (OUTPATIENT)
Dept: NEUROSURGERY | Facility: CLINIC | Age: 37
End: 2022-07-06

## 2022-07-06 VITALS
OXYGEN SATURATION: 99 % | TEMPERATURE: 97.6 F | RESPIRATION RATE: 16 BRPM | DIASTOLIC BLOOD PRESSURE: 86 MMHG | BODY MASS INDEX: 32.13 KG/M2 | HEIGHT: 68 IN | HEART RATE: 85 BPM | SYSTOLIC BLOOD PRESSURE: 121 MMHG | WEIGHT: 212 LBS

## 2022-07-06 DIAGNOSIS — M79.641 PAIN IN RIGHT HAND: ICD-10-CM

## 2022-07-06 DIAGNOSIS — M79.642 PAIN IN RIGHT HAND: ICD-10-CM

## 2022-07-06 DIAGNOSIS — M25.532 PAIN IN RIGHT WRIST: ICD-10-CM

## 2022-07-06 DIAGNOSIS — M25.531 PAIN IN RIGHT WRIST: ICD-10-CM

## 2022-07-06 PROCEDURE — 99024 POSTOP FOLLOW-UP VISIT: CPT

## 2022-07-06 NOTE — REASON FOR VISIT
[Spouse] : spouse [de-identified] : L5-S1 laminectomy, microdiskectomy  [de-identified] : 6/20/22

## 2022-07-06 NOTE — ASSESSMENT
[FreeTextEntry1] : 36-year-old female with 3 weeks of worsening back pain and lower limb radiculopathy found to have large L5-S1 disc herniation with cauda equina compression now s/p L5-S1 laminectomy/microdiscectomy.  Incision healing, no drainage/ redness, warmth.  Leg pain has resolved.  Continues to have some numbness in right foot that is improving slowly. \par \par PLAN: \par - RTC 3 months for re-evaluation \par \par Educated to shower daily. Wash wound with mild or baby shampoo. Pat incision dry with separate dry towel. \par Report all S/S infection including drainage, redness, warmth to touch of incision, chills. \par No soaking of incision (ex: baths, tubs, pools) for 6 weeks from the time of surgery. \par \par Patient verbalizes understanding of today’s discussion and next steps in treatment plan. \par \par \par A total of 15 minutes was spent reviewing the labs, imaging and physical examination of the patient. We discussed the diagnosis, and the plan. The patient's questions were answered. The patient demonstrated an excellent understanding of the plan. \par

## 2022-07-06 NOTE — HISTORY OF PRESENT ILLNESS
[FreeTextEntry1] : 37 y/o female with no PMHx, never smoker, who presented to [facility] ER 6/15/22 with 3 months of progressively  worsening right lower back pain radiating down right buttock to posterolateral thigh that was sharp/ shooting with numbness and tingling to right thigh and to top of her right foot. Difficulty lying/ sitting s/t pain. She failed conservative measures of PT pain management and completed MRI outpatient 6/9/22 with her PCP which showed central disc protrusions resulting in severe spinal canal stenosis @ L5-S1, moderate spinal canal stenosis at L4-5. \par Without weakness or urinary retention (PVR in ER).\par \par She was seen 6/17/22 as outpatient hospital follow-up. \par States pain limits her from sitting, lying, states sleep severely impaired given pain. States she feels right foot is weak, numb, more noticeable when ambulating. Denies bowel/ bladder incontinence; however, states that over the past 2 days she feels trouble initiating urine when she feels the urge to void. \par Pt recommended surgery, was pending date while she worked on  of her children. \par \par 6/20/22 pt returned to ER with worsening pain. \par She underwent L5-S1 laminectomy, microdiskectomy with intraop CSF leak repair 6/21/22. Pt dc home 6/23/22. \par DC meds: methocarbamol 500 mg q8 hr, multivitamin, neurontin 400 mg TID, prenatal. Firoicet prn, oxycodone 5 mg prn \par \par 6/28/22 pt returned with c/o fever/ chills, body aches x 3 days, also bilateral wrist and hand pain that is shock like to wrist and thumb x 4 days, aggravated by touching/ gripping her wrists and when using walker. States intermittent numbness to to thumb.\par States fever last night of 101.5 max that broke overnight with tylenol. Today afebrile 98.6 and last tylenol 9pm yesterday. She notes sore throat but denies cough,headaches, known sick contacts.\par States back pain and radicular pain and right thigh numbness has resolved, continued top of right foot numbness that is unchanged. \par Strength intact, Bilateral Tinel and Phalen sign positive. Back incision CDI, without drainage, redress, warmth. \par Pt sent for COVID testing which was negative,WBC WNL, ESR/CRP elevated. \par Recommended she stop using walker, hand PT and braces. \par \par TODAY pt returns for follow- up. She states wrist and thumb pain/ numbness has resolved on its own, without PT/ bracing. \par States back pain resolved, leg pain resolved. Improvement in right foot numbness. \par Denies weakness to lower extremities, numbness/tingling, bowel/ bladder incontinence. \par Denies signs of any postop wound infection which could include but not limited to redness, swelling, purulent drainage. Denies chest pain, shortness of breath, unilateral leg edema. \par  \par

## 2022-07-06 NOTE — PHYSICAL EXAM
[General Appearance - Alert] : alert [General Appearance - In No Acute Distress] : in no acute distress [Clean] : clean [Dry] : dry [Healing Well] : healing well [No Drainage] : without drainage [Normal Skin] : normal [Oriented To Time, Place, And Person] : oriented to person, place, and time [Impaired Insight] : insight and judgment were intact [Affect] : the affect was normal [Memory Recent] : recent memory was not impaired [Person] : oriented to person [Place] : oriented to place [Time] : oriented to time [Short Term Intact] : short term memory intact [Cranial Nerves Optic (II)] : visual acuity intact bilaterally,  pupils equal round and reactive to light [Cranial Nerves Oculomotor (III)] : extraocular motion intact [Cranial Nerves Trigeminal (V)] : facial sensation intact symmetrically [Cranial Nerves Facial (VII)] : face symmetrical [Cranial Nerves Vestibulocochlear (VIII)] : hearing was intact bilaterally [Cranial Nerves Glossopharyngeal (IX)] : tongue and palate midline [Cranial Nerves Accessory (XI - Cranial And Spinal)] : head turning and shoulder shrug symmetric [Cranial Nerves Hypoglossal (XII)] : there was no tongue deviation with protrusion [Motor Tone] : muscle tone was normal in all four extremities [Motor Strength] : muscle strength was normal in all four extremities [5] : S1 ankle flexors 5/5 [Sensation Tactile Decrease] : light touch was intact [Balance] : balance was intact [Sclera] : the sclera and conjunctiva were normal [PERRL With Normal Accommodation] : pupils were equal in size, round, reactive to light, with normal accommodation [Neck Appearance] : the appearance of the neck was normal [] : no respiratory distress [Respiration, Rhythm And Depth] : normal respiratory rhythm and effort [Heart Rate And Rhythm] : heart rate was normal and rhythm regular [Abnormal Walk] : normal gait [Skin Color & Pigmentation] : normal skin color and pigmentation [Erythema] : not erythematous [Warm] : not warm [Brock] : Brock's sign was not demonstrated [de-identified] : Positive Tinel sign bilaterally\par Phalen test positive \par

## 2022-07-06 NOTE — REVIEW OF SYSTEMS
[As Noted in HPI] : as noted in HPI [Fever] : no fever [Chills] : no chills [Leg Weakness] : no leg weakness [Difficulty Walking] : no difficulty walking [Chest Pain] : no chest pain [Palpitations] : no palpitations [Shortness Of Breath] : no shortness of breath [Incontinence] : no incontinence

## 2022-07-19 ENCOUNTER — NON-APPOINTMENT (OUTPATIENT)
Age: 37
End: 2022-07-19

## 2022-08-01 PROBLEM — M48.061 LUMBAR SPINAL STENOSIS: Status: ACTIVE | Noted: 2022-06-17

## 2022-08-03 ENCOUNTER — APPOINTMENT (OUTPATIENT)
Dept: NEUROSURGERY | Facility: CLINIC | Age: 37
End: 2022-08-03

## 2022-08-03 DIAGNOSIS — M51.26 OTHER INTERVERTEBRAL DISC DISPLACEMENT, LUMBAR REGION: ICD-10-CM

## 2022-08-03 DIAGNOSIS — M48.061 SPINAL STENOSIS, LUMBAR REGION WITHOUT NEUROGENIC CLAUDICATION: ICD-10-CM

## 2022-08-03 DIAGNOSIS — M54.10 RADICULOPATHY, SITE UNSPECIFIED: ICD-10-CM

## 2022-08-03 PROCEDURE — 99024 POSTOP FOLLOW-UP VISIT: CPT

## 2022-08-03 RX ORDER — OXYCODONE HYDROCHLORIDE AND ACETAMINOPHEN 10; 325 MG/1; MG/1
TABLET ORAL
Refills: 0 | Status: DISCONTINUED | COMMUNITY
End: 2022-08-03

## 2022-08-03 RX ORDER — ONDANSETRON 4 MG/1
4 TABLET, ORALLY DISINTEGRATING ORAL
Qty: 21 | Refills: 0 | Status: DISCONTINUED | COMMUNITY
Start: 2022-06-28 | End: 2022-08-03

## 2022-08-03 RX ORDER — OXYCODONE AND ACETAMINOPHEN 5; 325 MG/1; MG/1
5-325 TABLET ORAL
Qty: 12 | Refills: 0 | Status: DISCONTINUED | COMMUNITY
Start: 2022-06-15 | End: 2022-08-03

## 2022-08-03 NOTE — ASSESSMENT
[FreeTextEntry1] : 36-year-old female with 3 weeks of worsening back pain and lower limb radiculopathy found to have large L5-S1 disc herniation with cauda equina compression now s/p L5-S1 laminectomy/microdiscectomy.  Incision healing, no drainage/ redness, warmth.  Leg pain has resolved.  Post-op MRI with excellent decompression. Mild CSF collection, but improved compression. Continues to have some numbness in right foot that is improving slowly. \par \par Dr. D' Amico independently reviewed all available images with patient, MRI lumbar spine done at [facility] 7/27/22. \par \par PLAN: \par - Okay to walk, elliptical, core exercises \par - RTC 3 months post op for re-evaluation (apt for beginning of Oct 2022 already scheduled)\par \par Patient verbalizes understanding of today’s discussion and next steps in treatment plan. \par \par \par \par A total of 15 minutes was spent reviewing the labs, imaging and physical examination of the patient. We discussed the diagnosis, and the plan. The patient's questions were answered. The patient demonstrated an excellent understanding of the plan. \par

## 2022-08-03 NOTE — PHYSICAL EXAM
[General Appearance - Alert] : alert [General Appearance - In No Acute Distress] : in no acute distress [Oriented To Time, Place, And Person] : oriented to person, place, and time [Impaired Insight] : insight and judgment were intact [Affect] : the affect was normal [Memory Recent] : recent memory was not impaired [Sclera] : the sclera and conjunctiva were normal [Neck Appearance] : the appearance of the neck was normal [] : no respiratory distress [Skin Color & Pigmentation] : normal skin color and pigmentation

## 2022-08-03 NOTE — REVIEW OF SYSTEMS
[Numbness] : numbness [As Noted in HPI] : as noted in HPI [Fever] : no fever [Chills] : no chills [Leg Weakness] : no leg weakness [Difficulty Walking] : no difficulty walking [Chest Pain] : no chest pain [Palpitations] : no palpitations [Shortness Of Breath] : no shortness of breath [Cough] : no cough [Incontinence] : no incontinence

## 2022-08-03 NOTE — DATA REVIEWED
[de-identified] : \par Exam requested by:\par RANDY D'AMICO MD\par [address]\par [place] [address]\par SITE PERFORMED: [facility]\par SITE PHONE: [phone]\par Patient: CLARE YEN\par YOB: 1985\par Phone: [phone] \par MRN: [id number] Acc: [id number]\par Date of Exam: 07-\par  \par EXAM:  MRI LUMBAR SPINE WITHOUT CONTRAST\par \par HISTORY: 37-year-old female. Lower back pain. Prior surgery 6/21/2022.\par \par TECHNIQUE: Multiplanar, multi-sequential MRI of the lumbar spine was obtained on a 1.5T scanner using a standard protocol.\par \par COMPARISON:  None currently available\par \par FINDINGS:\par \par For purposes of this dictation, the last well-formed disc space will be labeled L5-S1.\par \par OSSEOUS STRUCTURES: Vertebral body heights are preserved. No marrow edema or destructive marrow infiltrative process. \par \par ALIGNMENT: Normal lumbar lordosis is preserved.  No significant scoliosis. Slight retrolisthesis of L5 on S1 No spondylolysis within the limitations of MRI.\par \par SPINAL CORD AND CONUS MEDULLARIS: Normal signal. The conus medullaris terminates at L1.\par \par PARASPINAL AND INTRA-ABDOMINAL SOFT TISSUES: Moderate posterior superficial soft tissue edematous change and fluid\par \par INCLUDED THORACIC SPINE AND SACRUM: Unremarkable.\par \par DISCS: The following axial levels are imaged and detailed below:\par \par L1-L2: No disc bulging or herniation. No spinal canal or foraminal stenosis.\par \par L2-L3: No disc bulging or herniation. No spinal canal or foraminal stenosis.\par \par L3-L4: Disc desiccation and component of posterior central peripheral annular tear. No spinal canal or foraminal compromise\par \par L4-L5: Disc desiccation. Disc bulge and component of the posterior central peripheral annular tear. Flattening of the ventral margin of the thecal sac and lateral recess encroachment. Ligamentous and facet hypertrophy\par \par L5-S1: Disc desiccation, disc bulge and right posterior central herniation with annular tear versus postsurgical change. Impingement in the region of the right S1 nerve root. Status post laminectomy. 2.5 cm fluid collection within the postoperative bed/laminectomy defect. Additional 1.5 cm fluid collection within posterior soft tissues superficial to the paraspinal musculature without definitive continuity with the thecal sac. Slight grade 1 retrolisthesis of L5 on S1\par \par IMPRESSION:  MRI of the lumbar spine demonstrates:\par \par L5-S1: Disc desiccation, disc bulge and right posterior central herniation with annular tear versus postsurgical change. Impingement in the region of the right S1 nerve root. Status post laminectomy. 2.5 cm fluid collection within the postoperative bed/laminectomy defect. Additional 1.5 cm fluid collection within posterior soft tissues superficial to the paraspinal musculature without definitive continuity with the thecal sac. Slight grade 1 retrolisthesis of L5 on S1\par \par L4-L5: Disc desiccation. Disc bulge and component of the posterior central peripheral annular tear. Flattening of the ventral margin of the thecal sac and lateral recess encroachment. Ligamentous and facet hypertrophy\par \par L3-L4: Disc desiccation and component of posterior central peripheral annular tear. No spinal canal or foraminal compromise\par \par

## 2022-08-03 NOTE — HISTORY OF PRESENT ILLNESS
[FreeTextEntry1] : 38 y/o female with no PMHx, never smoker, who presented to [facility] ER 6/15/22 with 3 months of progressively worsening right lower back pain radiating down right buttock to posterolateral thigh that was sharp/ shooting with numbness and tingling to right thigh and to top of her right foot. Difficulty lying/ sitting s/t pain. She failed conservative measures of PT pain management and completed MRI outpatient 6/9/22 with her PCP which showed central disc protrusions resulting in severe spinal canal stenosis @ L5-S1, moderate spinal canal stenosis at L4-5. \par Without weakness or urinary retention (PVR in ER).\par \par 6/17/22 she was seen in office with c/o pain limits her from sitting, lying, states sleep severely impaired given pain. States she feels right foot is weak, numb, more noticeable when ambulating. Denies bowel/ bladder incontinence; however, states that over the past 2 days she feels trouble initiating urine when she feels the urge to void. \par Pt recommended surgery, date pending her childcare coordination. \par \par 6/20/22 pt returned to ER with worsening pain. \par She underwent L5-S1 laminectomy, microdiskectomy with intraop CSF leak repair 6/21/22. Pt dc home 6/23/22. \par \par 6/28/22 pt returned with c/o fever/ chills, body aches x 3 days, also bilateral wrist and hand pain that is shock like to wrist and thumb x 4 days, aggravated by touching/ gripping her wrists and when using walker. States intermittent numbness to to thumb. Fever last night of 101.5 max that broke overnight with tylenol. Today afebrile 98.6 and last tylenol 9pm yesterday. She notes sore throat but denies cough,headaches, known sick contacts.\par States back pain and radicular pain and right thigh numbness has resolved, continued top of right foot numbness that is unchanged. \par Strength intact, Bilateral Tinel and Phalen sign positive. Back incision CDI, without drainage, redress, warmth. \par Pt sent for COVID testing which was negative,WBC WNL, ESR/CRP elevated. \par Recommended she stop using walker, hand PT and braces. \par \par 7/6/22 pt returned for f/u.  Wrist and thumb pain/ numbness has resolved on its own. \par Back pain resolved, leg pain resolved. Improvement in right foot numbness. Incision CDI. \par Recommended RTC 3 months. \par \par 7/19/22 pt called with c/o new onset lower back pain, radiating down right buttock, not below, that is sharp, burning. Denies swelling, drainage to incision. \par MRI lumbar spine ordered. \par \par TODAY pt presents to review MRI. She states pain dramatically improved, 3/10 when at its worse. \par Continued right foot numbness, improvement with big toe numbness. \par Denies lower extremity weakness, bowel/ bladder incontinence. \par Denies signs of any postop wound infection which could include but not limited to redness, swelling, purulent drainage. \par \par

## 2022-10-05 ENCOUNTER — APPOINTMENT (OUTPATIENT)
Dept: NEUROSURGERY | Facility: CLINIC | Age: 37
End: 2022-10-05

## 2022-10-05 PROCEDURE — 99212 OFFICE O/P EST SF 10 MIN: CPT | Mod: 95

## 2022-10-05 NOTE — PHYSICAL EXAM
[General Appearance - Alert] : alert [General Appearance - In No Acute Distress] : in no acute distress [Oriented To Time, Place, And Person] : oriented to person, place, and time [Impaired Insight] : insight and judgment were intact [Affect] : the affect was normal [Memory Recent] : recent memory was not impaired [Skin Color & Pigmentation] : normal skin color and pigmentation

## 2022-10-05 NOTE — REVIEW OF SYSTEMS
[As Noted in HPI] : as noted in HPI [Fever] : no fever [Chills] : no chills [Chest Pain] : no chest pain [Palpitations] : no palpitations [Shortness Of Breath] : no shortness of breath [Cough] : no cough

## 2022-10-05 NOTE — REASON FOR VISIT
[Home] : at home, [unfilled] , at the time of the visit. [Medical Office: ([facility])___] : at the medical office located in  [Patient] : the patient [Follow-Up: _____] : a [unfilled] follow-up visit [FreeTextEntry1] : status post L5-S1 laminectomy, microdiskectomy 6/20/22, 3 month follow- up. \par

## 2022-10-05 NOTE — HISTORY OF PRESENT ILLNESS
[FreeTextEntry1] : 36 y/o female with no PMHx, never smoker, who presented to [facility] ER 6/15/22 with 3 months of progressively worsening right lower back pain radiating down right buttock to posterolateral thigh that was sharp/ shooting with numbness and tingling to right thigh and to top of her right foot. Difficulty lying/ sitting s/t pain. She failed conservative measures of PT pain management and completed MRI outpatient 6/9/22 with her PCP which showed central disc protrusions resulting in severe spinal canal stenosis @ L5-S1, moderate spinal canal stenosis at L4-5. \par Without weakness or urinary retention (PVR in ER).\par \par 6/17/22 she was seen in office with c/o pain limits her from sitting, lying, states sleep severely impaired given pain. States she feels right foot is weak, numb, more noticeable when ambulating. Denies bowel/ bladder incontinence; however, states that over the past 2 days she feels trouble initiating urine when she feels the urge to void. \par Pt recommended surgery, date pending her childcare coordination. \par \par 6/20/22 pt returned to ER with worsening pain. \par She underwent L5-S1 laminectomy, microdiskectomy with intraop CSF leak repair 6/21/22. Pt dc home 6/23/22. \par \par 6/28/22 pt returned with c/o fever/ chills, body aches x 3 days, also bilateral wrist and hand pain that is shock like to wrist and thumb x 4 days, aggravated by touching/ gripping her wrists and when using walker. States intermittent numbness to to thumb. Fever last night of 101.5 max that broke overnight with tylenol. Today afebrile 98.6 and last tylenol 9pm yesterday. She notes sore throat but denies cough,headaches, known sick contacts.\par States back pain and radicular pain and right thigh numbness has resolved, continued top of right foot numbness that is unchanged. \par Strength intact, Bilateral Tinel and Phalen sign positive. Back incision CDI, without drainage, redress, warmth. \par Pt sent for COVID testing which was negative,WBC WNL, ESR/CRP elevated. \par Recommended she stop using walker, hand PT and braces. \par \par 7/6/22 pt returned for f/u. Wrist and thumb pain/ numbness has resolved on its own. \par Back pain resolved, leg pain resolved. Improvement in right foot numbness. Incision CDI. \par Recommended RTC 3 months. \par \par 7/19/22 pt called with c/o new onset lower back pain, radiating down right buttock, not below, that is sharp, burning. Denies swelling, drainage to incision. \par MRI lumbar spine ordered. \par \par 8/3/22 pt returned to review MRI done 7/27/22 at [facility] which showed mild CSF collection but improved compression. With continues numbness to right foot with some improvement. \par \par TODAY pt returns for 3 month post op follow- up. She endorses sciatica pain has resolved. Continues to have right foot numbness to lateral aspect. Denies weakness. \par States incision has healed. \par \par

## 2022-10-05 NOTE — ASSESSMENT
[FreeTextEntry1] : 36-year-old female with 3 weeks of worsening back pain and lower limb radiculopathy found to have large L5-S1 disc herniation with cauda equina compression now s/p L5-S1 laminectomy/microdiscectomy.  Incision healing, no drainage/ redness, warmth.  Leg pain has resolved but continues to have foot numbness with postural instability due to numbness.  Post-op MRI at 1 month with excellent decompression. Mild CSF collection, but improved compression.\par \par Dr. D' Amico independently reviewed all available images with patient, MRI lumbar spine done at [facility] 7/27/22. \par \par PLAN: \par - MRI lumbar spine \par - RTC after imaging to review \par \par Patient verbalizes understanding of today’s discussion and next steps in treatment plan. \par \par \par A total of 15 minutes was spent reviewing the labs, imaging and physical examination of the patient. We discussed the diagnosis, and the plan. The patient's questions were answered. The patient demonstrated an excellent understanding of the plan. \par

## 2022-11-02 ENCOUNTER — APPOINTMENT (OUTPATIENT)
Dept: NEUROSURGERY | Facility: CLINIC | Age: 37
End: 2022-11-02
Payer: COMMERCIAL

## 2022-11-02 PROCEDURE — 99212 OFFICE O/P EST SF 10 MIN: CPT | Mod: 95

## 2022-11-02 NOTE — HISTORY OF PRESENT ILLNESS
[FreeTextEntry1] : 36 y/o female with no PMHx, never smoker, who presented to [facility] ER 6/15/22 with 3 months of progressively worsening right lower back pain radiating down right buttock to posterolateral thigh that was sharp/ shooting with numbness and tingling to right thigh and to top of her right foot. Difficulty lying/ sitting s/t pain. She failed conservative measures of PT pain management and completed MRI outpatient 6/9/22 with her PCP which showed central disc protrusions resulting in severe spinal canal stenosis @ L5-S1, moderate spinal canal stenosis at L4-5. \par \par Now s/p L5-S1 laminectomy, microdiskectomy 6/20/22. \par \par 6/17/22 she was seen in office as outpatient f/u from ER with c/o pain limits her from sitting, lying, states sleep severely impaired given pain. States she feels right foot is weak, numb, more noticeable when ambulating. Denies bowel/ bladder incontinence; however, states that over the past 2 days she feels trouble initiating urine when she feels the urge to void. Pt recommended surgery, date pending her childcare coordination. \par \par 6/20/22 pt returned to ER with worsening pain. \par She underwent L5-S1 laminectomy, microdiskectomy with intraop CSF leak repair 6/21/22. Pt dc home 6/23/22. \par \par 6/28/22 pt returned with c/o fever/ chills, body aches x 3 days, also bilateral wrist and hand pain that is shock like to wrist and thumb x 4 days, aggravated by touching/ gripping her wrists and when using walker. States intermittent numbness to to thumb. Fever last night of 101.5 max that broke overnight with tylenol. Today afebrile 98.6 and last tylenol 9pm yesterday. She notes sore throat but denies cough,headaches, known sick contacts.\par States back pain and radicular pain and right thigh numbness has resolved, continued top of right foot numbness that is unchanged. \par Strength intact, Bilateral Tinel and Phalen sign positive. Back incision CDI, without drainage, redress, warmth. \par Pt sent for COVID testing which was negative,WBC WNL, ESR/CRP elevated. \par Recommended she stop using walker, hand PT and braces. \par \par 7/6/22 pt returned for f/u. Wrist and thumb pain/ numbness has resolved on its own. \par Back pain resolved, leg pain resolved. Improvement in right foot numbness. Incision CDI. \par Recommended RTC 3 months. \par \par 7/19/22 pt called with c/o new onset lower back pain, radiating down right buttock, not below, that is sharp, burning. Denies swelling, drainage to incision. \par MRI lumbar spine ordered. \par \par 8/3/22 pt returned to review MRI done 7/27/22 at [facility] which showed mild CSF collection but improved compression. With continues numbness to right foot with some improvement. \par \par 10/5/22 pt returned for 3 month post op follow- up. Reported continued right foot numbness to lateral aspect, without weakness. \par MRI lumbar ordered. \par \par TODAY patient returns for follow- up and MRI review. She endorses continued right foot numbness without improvement. She does have feeling in right toe. \par Sciatica pain has resolved since surgery and not returned. States some intermittent back pain that does not bother her much. \par Denies lower extremity weakness, issue with bowel/ bladder. \par

## 2022-11-02 NOTE — REASON FOR VISIT
[Home] : at home, [unfilled] , at the time of the visit. [Medical Office: ([facility])___] : at the medical office located in  [Patient] : the patient [Follow-Up: _____] : a [unfilled] follow-up visit [This encounter was initiated by telehealth (audio with video) and converted to telephone (audio only) due to technical difficulties.] : This encounter was initiated by telehealth (audio with video) and converted to telephone (audio only) due to technical difficulties. [FreeTextEntry1] : status post L5-S1 laminectomy, microdiskectomy 6/20/22, review MRI

## 2022-11-02 NOTE — ASSESSMENT
[FreeTextEntry1] : 36-year-old female with 3 weeks of worsening back pain and lower limb radiculopathy found to have large L5-S1 disc herniation with cauda equina compression now s/p L5-S1 laminectomy/microdiscectomy.  Incision healing, no drainage/ redness, warmth.  Leg pain has resolved but continues to have foot numbness with postural instability due to numbness.  Post-op MRI at 1 month with excellent decompression. Mild CSF collection, but improved compression. Repeat MRI with resolution of CSF collection, but residual right S1 compression. We discussed the potential benefit of steroid injection at this time.  I explained that it would provide both diagnostic and therapeutic effects.  We also discussed the possibility of reoperation for decompression of the nerve.  The patient wishes to defer surgery at this time.  I explained that at this point, she can follow-up as needed or if the injection worsens symptoms.\par \par Dr. D' Amico independently reviewed all available images with patient. \par \par PLAN: \par - Pain management referral for L5-S1 SABINO\par - RTC PRN\par \par Patient verbalizes understanding of today’s discussion and next steps in treatment plan. \par \par A total of 15 minutes was spent reviewing the labs, imaging and physical examination of the patient. We discussed the diagnosis, and the plan. The patient's questions were answered. The patient demonstrated an excellent understanding of the plan. \par

## 2023-01-23 ENCOUNTER — INPATIENT (INPATIENT)
Facility: HOSPITAL | Age: 38
LOS: 3 days | Discharge: HOME | DRG: 454 | End: 2023-01-27
Attending: NEUROLOGICAL SURGERY | Admitting: NEUROLOGICAL SURGERY
Payer: COMMERCIAL

## 2023-01-23 ENCOUNTER — TRANSCRIPTION ENCOUNTER (OUTPATIENT)
Age: 38
End: 2023-01-23

## 2023-01-23 ENCOUNTER — NON-APPOINTMENT (OUTPATIENT)
Age: 38
End: 2023-01-23

## 2023-01-23 VITALS
RESPIRATION RATE: 18 BRPM | DIASTOLIC BLOOD PRESSURE: 88 MMHG | SYSTOLIC BLOOD PRESSURE: 134 MMHG | HEART RATE: 106 BPM | TEMPERATURE: 99 F | WEIGHT: 186.07 LBS | OXYGEN SATURATION: 98 %

## 2023-01-23 DIAGNOSIS — M51.26 OTHER INTERVERTEBRAL DISC DISPLACEMENT, LUMBAR REGION: ICD-10-CM

## 2023-01-23 LAB
ALBUMIN SERPL ELPH-MCNC: 4.4 G/DL — SIGNIFICANT CHANGE UP (ref 3.3–5)
ALP SERPL-CCNC: 78 U/L — SIGNIFICANT CHANGE UP (ref 40–120)
ALT FLD-CCNC: 12 U/L — SIGNIFICANT CHANGE UP (ref 10–45)
ANION GAP SERPL CALC-SCNC: 12 MMOL/L — SIGNIFICANT CHANGE UP (ref 5–17)
APPEARANCE UR: CLEAR — SIGNIFICANT CHANGE UP
APTT BLD: 32 SEC — SIGNIFICANT CHANGE UP (ref 27.5–35.5)
AST SERPL-CCNC: 14 U/L — SIGNIFICANT CHANGE UP (ref 10–40)
BACTERIA # UR AUTO: PRESENT /HPF
BASOPHILS # BLD AUTO: 0.06 K/UL — SIGNIFICANT CHANGE UP (ref 0–0.2)
BASOPHILS NFR BLD AUTO: 0.6 % — SIGNIFICANT CHANGE UP (ref 0–2)
BILIRUB SERPL-MCNC: 0.4 MG/DL — SIGNIFICANT CHANGE UP (ref 0.2–1.2)
BILIRUB UR-MCNC: NEGATIVE — SIGNIFICANT CHANGE UP
BUN SERPL-MCNC: 15 MG/DL — SIGNIFICANT CHANGE UP (ref 7–23)
CALCIUM SERPL-MCNC: 9.6 MG/DL — SIGNIFICANT CHANGE UP (ref 8.4–10.5)
CHLORIDE SERPL-SCNC: 107 MMOL/L — SIGNIFICANT CHANGE UP (ref 96–108)
CO2 SERPL-SCNC: 26 MMOL/L — SIGNIFICANT CHANGE UP (ref 22–31)
COLOR SPEC: YELLOW — SIGNIFICANT CHANGE UP
COMMENT - URINE: SIGNIFICANT CHANGE UP
CREAT SERPL-MCNC: 0.64 MG/DL — SIGNIFICANT CHANGE UP (ref 0.5–1.3)
CRP SERPL-MCNC: 21.4 MG/L — HIGH (ref 0–4)
DIFF PNL FLD: NEGATIVE — SIGNIFICANT CHANGE UP
EGFR: 116 ML/MIN/1.73M2 — SIGNIFICANT CHANGE UP
EOSINOPHIL # BLD AUTO: 0.05 K/UL — SIGNIFICANT CHANGE UP (ref 0–0.5)
EOSINOPHIL NFR BLD AUTO: 0.5 % — SIGNIFICANT CHANGE UP (ref 0–6)
EPI CELLS # UR: ABNORMAL /HPF (ref 0–5)
ERYTHROCYTE [SEDIMENTATION RATE] IN BLOOD: 27 MM/HR — HIGH
GLUCOSE SERPL-MCNC: 105 MG/DL — HIGH (ref 70–99)
GLUCOSE UR QL: NEGATIVE — SIGNIFICANT CHANGE UP
HCT VFR BLD CALC: 39.4 % — SIGNIFICANT CHANGE UP (ref 34.5–45)
HGB BLD-MCNC: 12.8 G/DL — SIGNIFICANT CHANGE UP (ref 11.5–15.5)
IMM GRANULOCYTES NFR BLD AUTO: 0.3 % — SIGNIFICANT CHANGE UP (ref 0–0.9)
INR BLD: 1.1 — SIGNIFICANT CHANGE UP (ref 0.88–1.16)
KETONES UR-MCNC: NEGATIVE — SIGNIFICANT CHANGE UP
LEUKOCYTE ESTERASE UR-ACNC: ABNORMAL
LYMPHOCYTES # BLD AUTO: 2.26 K/UL — SIGNIFICANT CHANGE UP (ref 1–3.3)
LYMPHOCYTES # BLD AUTO: 21.6 % — SIGNIFICANT CHANGE UP (ref 13–44)
MCHC RBC-ENTMCNC: 27.3 PG — SIGNIFICANT CHANGE UP (ref 27–34)
MCHC RBC-ENTMCNC: 32.5 GM/DL — SIGNIFICANT CHANGE UP (ref 32–36)
MCV RBC AUTO: 84 FL — SIGNIFICANT CHANGE UP (ref 80–100)
MONOCYTES # BLD AUTO: 0.56 K/UL — SIGNIFICANT CHANGE UP (ref 0–0.9)
MONOCYTES NFR BLD AUTO: 5.3 % — SIGNIFICANT CHANGE UP (ref 2–14)
NEUTROPHILS # BLD AUTO: 7.52 K/UL — HIGH (ref 1.8–7.4)
NEUTROPHILS NFR BLD AUTO: 71.7 % — SIGNIFICANT CHANGE UP (ref 43–77)
NITRITE UR-MCNC: NEGATIVE — SIGNIFICANT CHANGE UP
NRBC # BLD: 0 /100 WBCS — SIGNIFICANT CHANGE UP (ref 0–0)
PH UR: 6.5 — SIGNIFICANT CHANGE UP (ref 5–8)
PLATELET # BLD AUTO: 469 K/UL — HIGH (ref 150–400)
POTASSIUM SERPL-MCNC: 3.9 MMOL/L — SIGNIFICANT CHANGE UP (ref 3.5–5.3)
POTASSIUM SERPL-SCNC: 3.9 MMOL/L — SIGNIFICANT CHANGE UP (ref 3.5–5.3)
PROT SERPL-MCNC: 7.6 G/DL — SIGNIFICANT CHANGE UP (ref 6–8.3)
PROT UR-MCNC: ABNORMAL MG/DL
PROTHROM AB SERPL-ACNC: 13.1 SEC — SIGNIFICANT CHANGE UP (ref 10.5–13.4)
RBC # BLD: 4.69 M/UL — SIGNIFICANT CHANGE UP (ref 3.8–5.2)
RBC # FLD: 13 % — SIGNIFICANT CHANGE UP (ref 10.3–14.5)
RBC CASTS # UR COMP ASSIST: < 5 /HPF — SIGNIFICANT CHANGE UP
SARS-COV-2 RNA SPEC QL NAA+PROBE: NEGATIVE — SIGNIFICANT CHANGE UP
SODIUM SERPL-SCNC: 145 MMOL/L — SIGNIFICANT CHANGE UP (ref 135–145)
SP GR SPEC: 1.02 — SIGNIFICANT CHANGE UP (ref 1–1.03)
UROBILINOGEN FLD QL: 0.2 E.U./DL — SIGNIFICANT CHANGE UP
WBC # BLD: 10.48 K/UL — SIGNIFICANT CHANGE UP (ref 3.8–10.5)
WBC # FLD AUTO: 10.48 K/UL — SIGNIFICANT CHANGE UP (ref 3.8–10.5)
WBC UR QL: ABNORMAL /HPF

## 2023-01-23 PROCEDURE — 99285 EMERGENCY DEPT VISIT HI MDM: CPT

## 2023-01-23 PROCEDURE — 72148 MRI LUMBAR SPINE W/O DYE: CPT | Mod: 26,MA

## 2023-01-23 RX ORDER — KETOROLAC TROMETHAMINE 30 MG/ML
15 SYRINGE (ML) INJECTION ONCE
Refills: 0 | Status: DISCONTINUED | OUTPATIENT
Start: 2023-01-23 | End: 2023-01-23

## 2023-01-23 RX ORDER — DEXAMETHASONE 0.5 MG/5ML
10 ELIXIR ORAL ONCE
Refills: 0 | Status: DISCONTINUED | OUTPATIENT
Start: 2023-01-23 | End: 2023-01-23

## 2023-01-23 RX ORDER — POLYETHYLENE GLYCOL 3350 17 G/17G
17 POWDER, FOR SOLUTION ORAL DAILY
Refills: 0 | Status: DISCONTINUED | OUTPATIENT
Start: 2023-01-23 | End: 2023-01-24

## 2023-01-23 RX ORDER — PANTOPRAZOLE SODIUM 20 MG/1
40 TABLET, DELAYED RELEASE ORAL
Refills: 0 | Status: DISCONTINUED | OUTPATIENT
Start: 2023-01-23 | End: 2023-01-24

## 2023-01-23 RX ORDER — ACETAMINOPHEN 500 MG
650 TABLET ORAL EVERY 6 HOURS
Refills: 0 | Status: DISCONTINUED | OUTPATIENT
Start: 2023-01-23 | End: 2023-01-24

## 2023-01-23 RX ORDER — OXYCODONE HYDROCHLORIDE 5 MG/1
5 TABLET ORAL EVERY 4 HOURS
Refills: 0 | Status: DISCONTINUED | OUTPATIENT
Start: 2023-01-23 | End: 2023-01-24

## 2023-01-23 RX ORDER — MORPHINE SULFATE 50 MG/1
4 CAPSULE, EXTENDED RELEASE ORAL ONCE
Refills: 0 | Status: DISCONTINUED | OUTPATIENT
Start: 2023-01-23 | End: 2023-01-23

## 2023-01-23 RX ORDER — ACETAMINOPHEN 500 MG
1000 TABLET ORAL ONCE
Refills: 0 | Status: COMPLETED | OUTPATIENT
Start: 2023-01-23 | End: 2023-01-23

## 2023-01-23 RX ORDER — DEXAMETHASONE 0.5 MG/5ML
4 ELIXIR ORAL EVERY 6 HOURS
Refills: 0 | Status: DISCONTINUED | OUTPATIENT
Start: 2023-01-23 | End: 2023-01-24

## 2023-01-23 RX ORDER — SENNA PLUS 8.6 MG/1
2 TABLET ORAL AT BEDTIME
Refills: 0 | Status: DISCONTINUED | OUTPATIENT
Start: 2023-01-23 | End: 2023-01-24

## 2023-01-23 RX ORDER — METHOCARBAMOL 500 MG/1
500 TABLET, FILM COATED ORAL EVERY 8 HOURS
Refills: 0 | Status: DISCONTINUED | OUTPATIENT
Start: 2023-01-23 | End: 2023-01-24

## 2023-01-23 RX ORDER — DEXAMETHASONE 0.5 MG/5ML
10 ELIXIR ORAL ONCE
Refills: 0 | Status: COMPLETED | OUTPATIENT
Start: 2023-01-23 | End: 2023-01-23

## 2023-01-23 RX ORDER — ONDANSETRON 8 MG/1
4 TABLET, FILM COATED ORAL EVERY 6 HOURS
Refills: 0 | Status: DISCONTINUED | OUTPATIENT
Start: 2023-01-23 | End: 2023-01-24

## 2023-01-23 RX ADMIN — Medication 400 MILLIGRAM(S): at 14:03

## 2023-01-23 RX ADMIN — Medication 15 MILLIGRAM(S): at 13:08

## 2023-01-23 RX ADMIN — MORPHINE SULFATE 4 MILLIGRAM(S): 50 CAPSULE, EXTENDED RELEASE ORAL at 14:04

## 2023-01-23 RX ADMIN — MORPHINE SULFATE 4 MILLIGRAM(S): 50 CAPSULE, EXTENDED RELEASE ORAL at 16:36

## 2023-01-23 RX ADMIN — POLYETHYLENE GLYCOL 3350 17 GRAM(S): 17 POWDER, FOR SOLUTION ORAL at 18:49

## 2023-01-23 RX ADMIN — SENNA PLUS 2 TABLET(S): 8.6 TABLET ORAL at 22:46

## 2023-01-23 RX ADMIN — MORPHINE SULFATE 4 MILLIGRAM(S): 50 CAPSULE, EXTENDED RELEASE ORAL at 16:12

## 2023-01-23 RX ADMIN — Medication 50 MILLIGRAM(S): at 22:46

## 2023-01-23 RX ADMIN — METHOCARBAMOL 500 MILLIGRAM(S): 500 TABLET, FILM COATED ORAL at 20:33

## 2023-01-23 RX ADMIN — Medication 650 MILLIGRAM(S): at 21:33

## 2023-01-23 RX ADMIN — MORPHINE SULFATE 4 MILLIGRAM(S): 50 CAPSULE, EXTENDED RELEASE ORAL at 13:09

## 2023-01-23 RX ADMIN — Medication 650 MILLIGRAM(S): at 20:33

## 2023-01-23 RX ADMIN — OXYCODONE HYDROCHLORIDE 5 MILLIGRAM(S): 5 TABLET ORAL at 23:46

## 2023-01-23 RX ADMIN — Medication 4 MILLIGRAM(S): at 22:46

## 2023-01-23 RX ADMIN — OXYCODONE HYDROCHLORIDE 5 MILLIGRAM(S): 5 TABLET ORAL at 22:46

## 2023-01-23 RX ADMIN — Medication 15 MILLIGRAM(S): at 14:04

## 2023-01-23 RX ADMIN — Medication 102 MILLIGRAM(S): at 16:35

## 2023-01-23 RX ADMIN — OXYCODONE HYDROCHLORIDE 5 MILLIGRAM(S): 5 TABLET ORAL at 19:03

## 2023-01-23 NOTE — H&P ADULT - NSHPPHYSICALEXAM_GEN_ALL_CORE
PHYSICAL EXAM:  General: NAD, comfortably lying in bed, on room air  Cardiovascular: regular rate and rhythm   Respiratory: nonlabored breathing, normal chest rise   GI: abdomen soft, nontender, nondistended  Neuro: AAOx3, following commands, speech clear, no aphasia  CN II-XII grossly intact, PERRL 3mm briskly reactive, EOMI, face symmetric  ACUNA x4 spontaneously, strength 5/5 in all extremities throughout, no pronator drift, no dysmetria  Decreased sensation to right foot (baseline)  Extremities: distal pulses 2+ x4

## 2023-01-23 NOTE — H&P ADULT - HISTORY OF PRESENT ILLNESS
38 yo female s/p L5-S1 lami/disc 06/2022, since been followed by pain management doctor. Recently s/p L4-L5 and L5-S1 SABINO on 01/14 due to RLE radiculopathy. Pt presented to OSH ED this past weekend due to RLE radiculopathy, CT lumbar spine was performed and read as unremarkable. Pt discharged home with pain medication. Pt presents to [facility] ED today due to persistent RLE radiculopathy contributing to decreased urination. Pt is ambulating and is currently able to control bladder. Denies localized weakness or saddle anesthesia.

## 2023-01-23 NOTE — ED PROVIDER NOTE - MUSCULOSKELETAL, MLM
Spine appears normal, range of motion is not limited, LE w/5/5 muscle strength b/l, good resistance b/l, + light touch b/l

## 2023-01-23 NOTE — ED ADULT TRIAGE NOTE - CHIEF COMPLAINT QUOTE
Pt states she started having back pain 2 weeks ago, had an epidural injection on 1/14, was feeling better but now has been unable to ambulate x5 days d/t pain. Denies saddle anesthesia, loss of bowel/bladder control. States she has had urinary retention since last night d/t pain.

## 2023-01-23 NOTE — ED ADULT NURSE REASSESSMENT NOTE - NS ED NURSE REASSESS COMMENT FT1
provided dinner tray, patient is complaining of severe pain NPIS 9 while changing position. Given PRN oxycodone 5mg PO.

## 2023-01-23 NOTE — ED PROVIDER NOTE - OBJECTIVE STATEMENT
The pt is a 36 y/o F, who presents to ED c/o LBP and R foot numbness - is s/p L5-S1 laminectomy 2/2 to herniated disk, recent epidural injection for pain control, states increased pain over past wk. Was seen at another ED few d ago, had ct - was neg, given pain meds and muscle relaxants, was advised to come to [facility] by her neurosurg. Pain is 8/10, constant, c/o burning to R leg and increased numbness to R foot. States that has been holding her urine in due to pain. Denies bowel or bladder incontinence, saddle anesthesia, falls, cp, sob, dysuria, n/v/d, abd pain, fevers, chills.

## 2023-01-23 NOTE — ED PROVIDER NOTE - ATTENDING APP SHARED VISIT CONTRIBUTION OF CARE
Attending Statement: I have personally performed a face to face diagnostic evaluation on this patient. I have reviewed the ACP note and agree with the history, exam and plan of care, except as noted.     Attending Contribution to Care:  37F s/p L5-S1 lami/disc 06/2022, presenting with persistent RLE radiculopathy, reports recent epidural and has had persistent pain and RLE numbness x 5 days. Plan for labs and MRI lumbar spine and NS consult. MRI reveals large herniated disc at L5-S1, NS requests admit for operative planning.

## 2023-01-23 NOTE — H&P ADULT - ASSESSMENT
38 yo female s/p L5-S1 lami/disc 06/2022, presenting with persistent RLE radiculopathy. MRI lumbar spine reveals large herniated disc at L5-S1.

## 2023-01-23 NOTE — ED PROVIDER NOTE - CLINICAL SUMMARY MEDICAL DECISION MAKING FREE TEXT BOX
pt c/o worsening lbp w/r leg burning and increased r foot numbness, hx of laminectomy, recent epidural for pain control, exam and hx not concerning for cord compression or cauda equina, suspect radiculopathy, labs w/elevated inflammatory markers, mri + herniated disk, pain controlled in ed, decadron given as per neurosurg, dispo as per neurosurg

## 2023-01-23 NOTE — ED ADULT NURSE NOTE - OBJECTIVE STATEMENT
patient presents to ED with Rt. leg pain and numbness for days, got epidural injection on 1/14, but the pain is getting worse, patient is complaining of urinary retention, no sensory changes, n/v/d, cp. sob. A&Ox4. stable at bed.

## 2023-01-23 NOTE — CONSULT NOTE ADULT - SUBJECTIVE AND OBJECTIVE BOX
HISTORY OF PRESENT ILLNESS:     36 yo female s/p L5-S1    PAST MEDICAL & SURGICAL HISTORY:  Lumbar herniated disc        FAMILY HISTORY:      SOCIAL HISTORY:  Tobacco Use:  EtOH use:   Substance:    Allergies    No Known Allergies    Intolerances        REVIEW OF SYSTEMS  General:	  Skin/Breast:  Ophthalmologic:  ENMT:	  Respiratory and Thorax:  Cardiovascular:	  Gastrointestinal:	  Genitourinary:	  Musculoskeletal:	  Neurological:	  Psychiatric:	  Hematology/Lymphatics:	  Endocrine:	  Allergic/Immunologic:	      MEDICATIONS:  Antibiotics:    Neuro:    Anticoagulation:    OTHER:    IVF:      Vital Signs Last 24 Hrs  T(C): 36.9 (2023 14:35), Max: 37.1 (2023 12:33)  T(F): 98.5 (2023 14:35), Max: 98.7 (2023 12:33)  HR: 65 (2023 14:35) (65 - 106)  BP: 126/82 (2023 14:35) (126/82 - 134/88)  BP(mean): --  RR: 18 (2023 14:35) (18 - 18)  SpO2: 97% (2023 14:35) (97% - 98%)    Parameters below as of 2023 12:33  Patient On (Oxygen Delivery Method): room air        PHYSICAL EXAM:  Constitutional:  Eyes:  ENMT:  Neck:  Back:  Respiratory:  Cardiovascular:  Gastrointestinal:  Genitourinary:  Rectal:  Vascular:  Neurological:  Skin:    LABS:                        12.8   10.48 )-----------( 469      ( 2023 12:59 )             39.4         145  |  107  |  15  ----------------------------<  105<H>  3.9   |  26  |  0.64    Ca    9.6      2023 12:59    TPro  7.6  /  Alb  4.4  /  TBili  0.4  /  DBili  x   /  AST  14  /  ALT  12  /  AlkPhos  78  01-23      Urinalysis Basic - ( 2023 12:59 )    Color: Yellow / Appearance: Clear / S.020 / pH: x  Gluc: x / Ketone: NEGATIVE  / Bili: Negative / Urobili: 0.2 E.U./dL   Blood: x / Protein: Trace mg/dL / Nitrite: NEGATIVE   Leuk Esterase: Moderate / RBC: < 5 /HPF / WBC 5-10 /HPF   Sq Epi: x / Non Sq Epi: 5-10 /HPF / Bacteria: Present /HPF      CULTURES:      RADIOLOGY & ADDITIONAL STUDIES:    Assessment:      Plan:     HISTORY OF PRESENT ILLNESS:     38 yo female s/p L5-S1 lami/disc 2022, since been followed by pain management doctor. Recently s/p L4-L5 and L5-S1 SABINO on  due to RLE radiculopathy. Pt presented to OSH ED this past weekend due to RLE radiculopathy, CT lumbar spine was performed and read as unremarkable. Pt discharged home with pain medication. Pt presents to [facility] ED today due to persistent RLE radiculopathy contributing to decreased urination. Pt is ambulating and is currently able to control bladder. Denies localized weakness or saddle anesthesia.      PAST MEDICAL & SURGICAL HISTORY:  Lumbar herniated disc        FAMILY HISTORY:      SOCIAL HISTORY:  Tobacco Use:  EtOH use:   Substance:    Allergies    No Known Allergies    Intolerances        REVIEW OF SYSTEMS    MEDICATIONS:  Antibiotics:     Neuro:    Anticoagulation:    OTHER:    IVF:      Vital Signs Last 24 Hrs  T(C): 36.9 (2023 14:35), Max: 37.1 (2023 12:33)  T(F): 98.5 (2023 14:35), Max: 98.7 (2023 12:33)  HR: 65 (2023 14:35) (65 - 106)  BP: 126/82 (2023 14:35) (126/82 - 134/88)  BP(mean): --  RR: 18 (2023 14:35) (18 - 18)  SpO2: 97% (2023 14:35) (97% - 98%)    Parameters below as of 2023 12:33  Patient On (Oxygen Delivery Method): room air        PHYSICAL EXAM:  General: NAD, comfortably lying in bed, on room air  Cardiovascular: regular rate and rhythm   Respiratory: nonlabored breathing, normal chest rise   GI: abdomen soft, nontender, nondistended  Neuro: AAOx3, following commands, speech clear, no aphasia  CN II-XII grossly intact, PERRL 3mm briskly reactive, EOMI, face symmetric  ACUNA x4 spontaneously, strength 5/5 in all extremities throughout, no pronator drift, no dysmetria  Decreased sensation to right foot (baseline)  Extremities: distal pulses 2+ x4        LABS:                        12.8   10.48 )-----------( 469      ( 2023 12:59 )             39.4         145  |  107  |  15  ----------------------------<  105<H>  3.9   |  26  |  0.64    Ca    9.6      2023 12:59    TPro  7.6  /  Alb  4.4  /  TBili  0.4  /  DBili  x   /  AST  14  /  ALT  12  /  AlkPhos  78        Urinalysis Basic - ( 2023 12:59 )    Color: Yellow / Appearance: Clear / S.020 / pH: x  Gluc: x / Ketone: NEGATIVE  / Bili: Negative / Urobili: 0.2 E.U./dL   Blood: x / Protein: Trace mg/dL / Nitrite: NEGATIVE   Leuk Esterase: Moderate / RBC: < 5 /HPF / WBC 5-10 /HPF   Sq Epi: x / Non Sq Epi: 5-10 /HPF / Bacteria: Present /HPF      CULTURES:      RADIOLOGY & ADDITIONAL STUDIES:    Assessment:  38 yo female s/p L5-S1 lami/disc 2022, since been followed by pain management doctor. Recently s/p L4-L5 and L5-S1 SABINO on  due to RLE radiculopathy. Pt presented to OSH ED this past weekend due to RLE radiculopathy, CT lumbar spine was performed and read as unremarkable. Pt discharged home with pain medication. Pt presents to [facility] ED today due to persistent RLE radiculopathy contributing to decreased urination. Pt is ambulating and is currently able to control bladder. Denies localized weakness or saddle anesthesia.    Plan:  - recommend MRI lumbar spine  - pain control  - d/w Dr. D'Amico

## 2023-01-24 ENCOUNTER — TRANSCRIPTION ENCOUNTER (OUTPATIENT)
Age: 38
End: 2023-01-24

## 2023-01-24 DIAGNOSIS — Z29.9 ENCOUNTER FOR PROPHYLACTIC MEASURES, UNSPECIFIED: ICD-10-CM

## 2023-01-24 DIAGNOSIS — E66.3 OVERWEIGHT: ICD-10-CM

## 2023-01-24 DIAGNOSIS — E66.01 MORBID (SEVERE) OBESITY DUE TO EXCESS CALORIES: ICD-10-CM

## 2023-01-24 DIAGNOSIS — Z01.818 ENCOUNTER FOR OTHER PREPROCEDURAL EXAMINATION: ICD-10-CM

## 2023-01-24 DIAGNOSIS — R82.71 BACTERIURIA: ICD-10-CM

## 2023-01-24 DIAGNOSIS — D75.839 THROMBOCYTOSIS, UNSPECIFIED: ICD-10-CM

## 2023-01-24 LAB
ANION GAP SERPL CALC-SCNC: 10 MMOL/L — SIGNIFICANT CHANGE UP (ref 5–17)
BLD GP AB SCN SERPL QL: NEGATIVE — SIGNIFICANT CHANGE UP
BUN SERPL-MCNC: 15 MG/DL — SIGNIFICANT CHANGE UP (ref 7–23)
CALCIUM SERPL-MCNC: 9.8 MG/DL — SIGNIFICANT CHANGE UP (ref 8.4–10.5)
CHLORIDE SERPL-SCNC: 102 MMOL/L — SIGNIFICANT CHANGE UP (ref 96–108)
CO2 SERPL-SCNC: 25 MMOL/L — SIGNIFICANT CHANGE UP (ref 22–31)
CREAT SERPL-MCNC: 0.63 MG/DL — SIGNIFICANT CHANGE UP (ref 0.5–1.3)
CRP SERPL-MCNC: 21.2 MG/L — HIGH (ref 0–4)
CULTURE RESULTS: SIGNIFICANT CHANGE UP
EGFR: 116 ML/MIN/1.73M2 — SIGNIFICANT CHANGE UP
ERYTHROCYTE [SEDIMENTATION RATE] IN BLOOD: 17 MM/HR — HIGH
GLUCOSE BLDC GLUCOMTR-MCNC: 110 MG/DL — HIGH (ref 70–99)
GLUCOSE SERPL-MCNC: 126 MG/DL — HIGH (ref 70–99)
HCG SERPL-ACNC: <0 MIU/ML — SIGNIFICANT CHANGE UP
HCT VFR BLD CALC: 38.5 % — SIGNIFICANT CHANGE UP (ref 34.5–45)
HGB BLD-MCNC: 12.5 G/DL — SIGNIFICANT CHANGE UP (ref 11.5–15.5)
MAGNESIUM SERPL-MCNC: 2.2 MG/DL — SIGNIFICANT CHANGE UP (ref 1.6–2.6)
MCHC RBC-ENTMCNC: 27.5 PG — SIGNIFICANT CHANGE UP (ref 27–34)
MCHC RBC-ENTMCNC: 32.5 GM/DL — SIGNIFICANT CHANGE UP (ref 32–36)
MCV RBC AUTO: 84.8 FL — SIGNIFICANT CHANGE UP (ref 80–100)
NRBC # BLD: 0 /100 WBCS — SIGNIFICANT CHANGE UP (ref 0–0)
PHOSPHATE SERPL-MCNC: 3.3 MG/DL — SIGNIFICANT CHANGE UP (ref 2.5–4.5)
PLATELET # BLD AUTO: 418 K/UL — HIGH (ref 150–400)
POTASSIUM SERPL-MCNC: 4.1 MMOL/L — SIGNIFICANT CHANGE UP (ref 3.5–5.3)
POTASSIUM SERPL-SCNC: 4.1 MMOL/L — SIGNIFICANT CHANGE UP (ref 3.5–5.3)
RBC # BLD: 4.54 M/UL — SIGNIFICANT CHANGE UP (ref 3.8–5.2)
RBC # FLD: 13.2 % — SIGNIFICANT CHANGE UP (ref 10.3–14.5)
RH IG SCN BLD-IMP: NEGATIVE — SIGNIFICANT CHANGE UP
SODIUM SERPL-SCNC: 137 MMOL/L — SIGNIFICANT CHANGE UP (ref 135–145)
SPECIMEN SOURCE: SIGNIFICANT CHANGE UP
WBC # BLD: 10.49 K/UL — SIGNIFICANT CHANGE UP (ref 3.8–10.5)
WBC # FLD AUTO: 10.49 K/UL — SIGNIFICANT CHANGE UP (ref 3.8–10.5)

## 2023-01-24 PROCEDURE — 22633 ARTHRD CMBN 1NTRSPC LUMBAR: CPT

## 2023-01-24 PROCEDURE — 22853 INSJ BIOMECHANICAL DEVICE: CPT

## 2023-01-24 PROCEDURE — 22214 INCIS 1 VERTEBRAL SEG LUMBAR: CPT

## 2023-01-24 PROCEDURE — 22214 INCIS 1 VERTEBRAL SEG LUMBAR: CPT | Mod: AS

## 2023-01-24 PROCEDURE — 99223 1ST HOSP IP/OBS HIGH 75: CPT

## 2023-01-24 PROCEDURE — 22633 ARTHRD CMBN 1NTRSPC LUMBAR: CPT | Mod: AS

## 2023-01-24 PROCEDURE — 22853 INSJ BIOMECHANICAL DEVICE: CPT | Mod: AS

## 2023-01-24 PROCEDURE — 99232 SBSQ HOSP IP/OBS MODERATE 35: CPT

## 2023-01-24 PROCEDURE — 22840 INSERT SPINE FIXATION DEVICE: CPT

## 2023-01-24 PROCEDURE — 22840 INSERT SPINE FIXATION DEVICE: CPT | Mod: AS

## 2023-01-24 DEVICE — ARTHREX GUIDE PIN W SUTURE EYE 2.4MM: Type: IMPLANTABLE DEVICE | Status: FUNCTIONAL

## 2023-01-24 DEVICE — SURGIFLO HEMOSTATIC MATRIX KIT: Type: IMPLANTABLE DEVICE | Status: FUNCTIONAL

## 2023-01-24 DEVICE — IMPLANTABLE DEVICE: Type: IMPLANTABLE DEVICE | Status: FUNCTIONAL

## 2023-01-24 DEVICE — ROD CONTOURED 5.5X40MM: Type: IMPLANTABLE DEVICE | Status: FUNCTIONAL

## 2023-01-24 DEVICE — SCREW POLY 6.5X45MM: Type: IMPLANTABLE DEVICE | Status: FUNCTIONAL

## 2023-01-24 DEVICE — SET SCREW EVEREST ATR: Type: IMPLANTABLE DEVICE | Status: FUNCTIONAL

## 2023-01-24 RX ORDER — MINERAL OIL
133 OIL (ML) MISCELLANEOUS ONCE
Refills: 0 | Status: COMPLETED | OUTPATIENT
Start: 2023-01-24 | End: 2023-01-24

## 2023-01-24 RX ORDER — SODIUM CHLORIDE 9 MG/ML
1000 INJECTION INTRAMUSCULAR; INTRAVENOUS; SUBCUTANEOUS
Refills: 0 | Status: DISCONTINUED | OUTPATIENT
Start: 2023-01-24 | End: 2023-01-25

## 2023-01-24 RX ORDER — DEXTROSE 50 % IN WATER 50 %
12.5 SYRINGE (ML) INTRAVENOUS ONCE
Refills: 0 | Status: DISCONTINUED | OUTPATIENT
Start: 2023-01-24 | End: 2023-01-25

## 2023-01-24 RX ORDER — SENNA PLUS 8.6 MG/1
2 TABLET ORAL AT BEDTIME
Refills: 0 | Status: DISCONTINUED | OUTPATIENT
Start: 2023-01-24 | End: 2023-01-27

## 2023-01-24 RX ORDER — OXYCODONE HYDROCHLORIDE 5 MG/1
5 TABLET ORAL EVERY 4 HOURS
Refills: 0 | Status: DISCONTINUED | OUTPATIENT
Start: 2023-01-24 | End: 2023-01-24

## 2023-01-24 RX ORDER — POVIDONE-IODINE 5 %
1 AEROSOL (ML) TOPICAL ONCE
Refills: 0 | Status: COMPLETED | OUTPATIENT
Start: 2023-01-24 | End: 2023-01-24

## 2023-01-24 RX ORDER — HYDROMORPHONE HYDROCHLORIDE 2 MG/ML
0.25 INJECTION INTRAMUSCULAR; INTRAVENOUS; SUBCUTANEOUS ONCE
Refills: 0 | Status: DISCONTINUED | OUTPATIENT
Start: 2023-01-24 | End: 2023-01-24

## 2023-01-24 RX ORDER — APREPITANT 80 MG/1
40 CAPSULE ORAL ONCE
Refills: 0 | Status: COMPLETED | OUTPATIENT
Start: 2023-01-24 | End: 2023-01-24

## 2023-01-24 RX ORDER — OXYCODONE HYDROCHLORIDE 5 MG/1
10 TABLET ORAL EVERY 4 HOURS
Refills: 0 | Status: DISCONTINUED | OUTPATIENT
Start: 2023-01-24 | End: 2023-01-24

## 2023-01-24 RX ORDER — OXYCODONE HYDROCHLORIDE 5 MG/1
5 TABLET ORAL EVERY 4 HOURS
Refills: 0 | Status: DISCONTINUED | OUTPATIENT
Start: 2023-01-24 | End: 2023-01-26

## 2023-01-24 RX ORDER — HYDROMORPHONE HYDROCHLORIDE 2 MG/ML
0.5 INJECTION INTRAMUSCULAR; INTRAVENOUS; SUBCUTANEOUS
Refills: 0 | Status: DISCONTINUED | OUTPATIENT
Start: 2023-01-24 | End: 2023-01-24

## 2023-01-24 RX ORDER — INSULIN LISPRO 100/ML
VIAL (ML) SUBCUTANEOUS
Refills: 0 | Status: DISCONTINUED | OUTPATIENT
Start: 2023-01-24 | End: 2023-01-25

## 2023-01-24 RX ORDER — DEXAMETHASONE 0.5 MG/5ML
4 ELIXIR ORAL EVERY 6 HOURS
Refills: 0 | Status: COMPLETED | OUTPATIENT
Start: 2023-01-24 | End: 2023-01-26

## 2023-01-24 RX ORDER — OXYCODONE HYDROCHLORIDE 5 MG/1
10 TABLET ORAL EVERY 4 HOURS
Refills: 0 | Status: DISCONTINUED | OUTPATIENT
Start: 2023-01-24 | End: 2023-01-26

## 2023-01-24 RX ORDER — SODIUM CHLORIDE 9 MG/ML
1000 INJECTION INTRAMUSCULAR; INTRAVENOUS; SUBCUTANEOUS
Refills: 0 | Status: DISCONTINUED | OUTPATIENT
Start: 2023-01-24 | End: 2023-01-24

## 2023-01-24 RX ORDER — CHLORHEXIDINE GLUCONATE 213 G/1000ML
1 SOLUTION TOPICAL ONCE
Refills: 0 | Status: COMPLETED | OUTPATIENT
Start: 2023-01-24 | End: 2023-01-24

## 2023-01-24 RX ORDER — ACETAMINOPHEN 500 MG
1000 TABLET ORAL EVERY 8 HOURS
Refills: 0 | Status: DISCONTINUED | OUTPATIENT
Start: 2023-01-24 | End: 2023-01-24

## 2023-01-24 RX ORDER — PANTOPRAZOLE SODIUM 20 MG/1
40 TABLET, DELAYED RELEASE ORAL
Refills: 0 | Status: DISCONTINUED | OUTPATIENT
Start: 2023-01-24 | End: 2023-01-27

## 2023-01-24 RX ORDER — GLUCAGON INJECTION, SOLUTION 0.5 MG/.1ML
1 INJECTION, SOLUTION SUBCUTANEOUS ONCE
Refills: 0 | Status: DISCONTINUED | OUTPATIENT
Start: 2023-01-24 | End: 2023-01-25

## 2023-01-24 RX ORDER — POLYETHYLENE GLYCOL 3350 17 G/17G
17 POWDER, FOR SOLUTION ORAL DAILY
Refills: 0 | Status: DISCONTINUED | OUTPATIENT
Start: 2023-01-24 | End: 2023-01-27

## 2023-01-24 RX ORDER — SODIUM CHLORIDE 9 MG/ML
1000 INJECTION, SOLUTION INTRAVENOUS
Refills: 0 | Status: DISCONTINUED | OUTPATIENT
Start: 2023-01-24 | End: 2023-01-25

## 2023-01-24 RX ORDER — DEXTROSE 50 % IN WATER 50 %
25 SYRINGE (ML) INTRAVENOUS ONCE
Refills: 0 | Status: DISCONTINUED | OUTPATIENT
Start: 2023-01-24 | End: 2023-01-25

## 2023-01-24 RX ORDER — ACETAMINOPHEN 500 MG
1000 TABLET ORAL EVERY 8 HOURS
Refills: 0 | Status: DISCONTINUED | OUTPATIENT
Start: 2023-01-24 | End: 2023-01-27

## 2023-01-24 RX ORDER — METHOCARBAMOL 500 MG/1
500 TABLET, FILM COATED ORAL EVERY 8 HOURS
Refills: 0 | Status: DISCONTINUED | OUTPATIENT
Start: 2023-01-24 | End: 2023-01-26

## 2023-01-24 RX ORDER — POLYETHYLENE GLYCOL 3350 17 G/17G
17 POWDER, FOR SOLUTION ORAL ONCE
Refills: 0 | Status: COMPLETED | OUTPATIENT
Start: 2023-01-24 | End: 2023-01-24

## 2023-01-24 RX ORDER — DEXTROSE 50 % IN WATER 50 %
15 SYRINGE (ML) INTRAVENOUS ONCE
Refills: 0 | Status: DISCONTINUED | OUTPATIENT
Start: 2023-01-24 | End: 2023-01-25

## 2023-01-24 RX ORDER — CEFAZOLIN SODIUM 1 G
2000 VIAL (EA) INJECTION EVERY 8 HOURS
Refills: 0 | Status: COMPLETED | OUTPATIENT
Start: 2023-01-24 | End: 2023-01-25

## 2023-01-24 RX ORDER — ONDANSETRON 8 MG/1
4 TABLET, FILM COATED ORAL EVERY 6 HOURS
Refills: 0 | Status: DISCONTINUED | OUTPATIENT
Start: 2023-01-24 | End: 2023-01-27

## 2023-01-24 RX ADMIN — Medication 50 MILLIGRAM(S): at 21:50

## 2023-01-24 RX ADMIN — METHOCARBAMOL 500 MILLIGRAM(S): 500 TABLET, FILM COATED ORAL at 21:50

## 2023-01-24 RX ADMIN — Medication 100 MILLIGRAM(S): at 21:51

## 2023-01-24 RX ADMIN — Medication 4 MILLIGRAM(S): at 10:50

## 2023-01-24 RX ADMIN — Medication 1000 MILLIGRAM(S): at 13:23

## 2023-01-24 RX ADMIN — HYDROMORPHONE HYDROCHLORIDE 0.25 MILLIGRAM(S): 2 INJECTION INTRAMUSCULAR; INTRAVENOUS; SUBCUTANEOUS at 02:05

## 2023-01-24 RX ADMIN — Medication 1000 MILLIGRAM(S): at 22:50

## 2023-01-24 RX ADMIN — SENNA PLUS 2 TABLET(S): 8.6 TABLET ORAL at 21:49

## 2023-01-24 RX ADMIN — Medication 4 MILLIGRAM(S): at 05:50

## 2023-01-24 RX ADMIN — Medication 1 APPLICATION(S): at 12:22

## 2023-01-24 RX ADMIN — OXYCODONE HYDROCHLORIDE 10 MILLIGRAM(S): 5 TABLET ORAL at 10:31

## 2023-01-24 RX ADMIN — POLYETHYLENE GLYCOL 3350 17 GRAM(S): 17 POWDER, FOR SOLUTION ORAL at 21:49

## 2023-01-24 RX ADMIN — CHLORHEXIDINE GLUCONATE 1 APPLICATION(S): 213 SOLUTION TOPICAL at 12:15

## 2023-01-24 RX ADMIN — Medication 133 MILLILITER(S): at 11:26

## 2023-01-24 RX ADMIN — OXYCODONE HYDROCHLORIDE 10 MILLIGRAM(S): 5 TABLET ORAL at 22:55

## 2023-01-24 RX ADMIN — Medication 1000 MILLIGRAM(S): at 21:50

## 2023-01-24 RX ADMIN — Medication 4 MILLIGRAM(S): at 21:50

## 2023-01-24 RX ADMIN — OXYCODONE HYDROCHLORIDE 10 MILLIGRAM(S): 5 TABLET ORAL at 09:31

## 2023-01-24 RX ADMIN — Medication 50 MILLIGRAM(S): at 05:50

## 2023-01-24 RX ADMIN — HYDROMORPHONE HYDROCHLORIDE 0.25 MILLIGRAM(S): 2 INJECTION INTRAMUSCULAR; INTRAVENOUS; SUBCUTANEOUS at 19:19

## 2023-01-24 RX ADMIN — HYDROMORPHONE HYDROCHLORIDE 0.25 MILLIGRAM(S): 2 INJECTION INTRAMUSCULAR; INTRAVENOUS; SUBCUTANEOUS at 02:20

## 2023-01-24 RX ADMIN — PANTOPRAZOLE SODIUM 40 MILLIGRAM(S): 20 TABLET, DELAYED RELEASE ORAL at 05:50

## 2023-01-24 RX ADMIN — OXYCODONE HYDROCHLORIDE 10 MILLIGRAM(S): 5 TABLET ORAL at 23:55

## 2023-01-24 RX ADMIN — Medication 1000 MILLIGRAM(S): at 12:23

## 2023-01-24 RX ADMIN — APREPITANT 40 MILLIGRAM(S): 80 CAPSULE ORAL at 12:24

## 2023-01-24 NOTE — PROGRESS NOTE ADULT - SUBJECTIVE AND OBJECTIVE BOX
HPI:  36 yo female s/p L5-S1 lami/disc 2022, since been followed by pain management doctor. Recently s/p L4-L5 and L5-S1 SABINO on  due to RLE radiculopathy. Pt presented to OSH ED this past weekend due to RLE radiculopathy, CT lumbar spine was performed and read as unremarkable. Pt discharged home with pain medication. Pt presents to [facility] ED today due to persistent RLE radiculopathy contributing to decreased urination. Pt is ambulating and is currently able to control bladder. Denies localized weakness or saddle anesthesia.   (2023 17:47)  : Admitted for severe back pain, recurrent herniated disc, pain control overnight with plan for OR this week  : VERITO     OVERNIGHT EVENTS: no acute events   Vital Signs Last 24 Hrs  T(C): 36.6 (2023 20:19), Max: 37.1 (2023 12:33)  T(F): 97.8 (2023 20:19), Max: 98.7 (2023 12:33)  HR: 76 (2023 20:19) (65 - 106)  BP: 127/87 (2023 20:19) (126/82 - 137/89)  BP(mean): --  RR: 16 (2023 20:19) (16 - 18)  SpO2: 97% (2023 20:19) (97% - 98%)    Parameters below as of 2023 20:19  Patient On (Oxygen Delivery Method): room air        I&O's Summary    Physical Exam: PHYSICAL EXAM:  General: NAD, comfortably lying in bed, on room air  Cardiovascular: regular rate and rhythm   Respiratory: nonlabored breathing, normal chest rise   GI: abdomen soft, nontender, nondistended  Neuro: AAOx3, following commands, speech clear, no aphasia  CN II-XII grossly intact, PERRL 3mm briskly reactive, EOMI, face symmetric  ACUNA x4 spontaneously, strength 5/5 in all extremities throughout, no pronator drift, no dysmetria  Extremities: distal pulses 2+ x4    LABS:                        12.8   10.48 )-----------( 469      ( 2023 12:59 )             39.4     01-23    145  |  107  |  15  ----------------------------<  105<H>  3.9   |  26  |  0.64    Ca    9.6      2023 12:59    TPro  7.6  /  Alb  4.4  /  TBili  0.4  /  DBili  x   /  AST  14  /  ALT  12  /  AlkPhos  78      PT/INR - ( 2023 17:50 )   PT: 13.1 sec;   INR: 1.10          PTT - ( 2023 17:50 )  PTT:32.0 sec  Urinalysis Basic - ( 2023 12:59 )    Color: Yellow / Appearance: Clear / S.020 / pH: x  Gluc: x / Ketone: NEGATIVE  / Bili: Negative / Urobili: 0.2 E.U./dL   Blood: x / Protein: Trace mg/dL / Nitrite: NEGATIVE   Leuk Esterase: Moderate / RBC: < 5 /HPF / WBC 5-10 /HPF   Sq Epi: x / Non Sq Epi: 5-10 /HPF / Bacteria: Present /HPF          CAPILLARY BLOOD GLUCOSE          Drug Levels: [] N/A    CSF Analysis: [] N/A      Allergies    No Known Allergies    Intolerances      MEDICATIONS:  Antibiotics:    Neuro:  acetaminophen     Tablet .. 650 milliGRAM(s) Oral every 6 hours PRN  methocarbamol 500 milliGRAM(s) Oral every 8 hours PRN  ondansetron Injectable 4 milliGRAM(s) IV Push every 6 hours PRN  oxyCODONE    IR 5 milliGRAM(s) Oral every 4 hours PRN  pregabalin 50 milliGRAM(s) Oral every 8 hours    Anticoagulation:    OTHER:  dexAMETHasone     Tablet 4 milliGRAM(s) Oral every 6 hours  pantoprazole    Tablet 40 milliGRAM(s) Oral before breakfast  polyethylene glycol 3350 17 Gram(s) Oral daily  senna 2 Tablet(s) Oral at bedtime    ASSESSMENT:  36 yo female s/p L5-S1 lami/disc 2022, presenting with persistent RLE radiculopathy. MRI lumbar spine reveals large herniated disc at L5-S1.     PLAN:  NEURO:  - neuro/vitals  - pain control: tylenol prn, oxy prn, robaxin, lyrica  - decadron 4q6  - MRI - large HNP at L5-S1  - OR planning    CARDS:  - SBP normotensive    PULM:  - room air, satting well     GI:  - regular diet  - bowel regimen  - protonix while on decadron     RENAL:  - IVL  - monitor voiding    ENDO:  - ISS    ID:  - afebrile  - trend ESR/CRP    HEME:   - SCDs, hold SQL for now    Dispo:  Regional bed, pending OR    D/w Dr. Horner    [] Sepsis  [] hypovolemic shock,[] cardiogenic shock, [] hemorrhagic shock, [] neuogenic shock  [] Acute Respiratory Failure  []Cerebral edema, [] Brain compression/ herniation,   [] Functional quadriplegia  [] Acute blood loss anemia

## 2023-01-24 NOTE — CONSULT NOTE ADULT - TIME BILLING
Initial consult, review of hospital course, labs, vitals, medical records.   EKG review  Bedside exam and interview    Preoperative assessment  Discussed plan of care with primary team   Documenting the encounter

## 2023-01-24 NOTE — CONSULT NOTE ADULT - PROBLEM SELECTOR RECOMMENDATION 5
Previously on Mounjaro at home switched to Wegovy, has had good weight loss  - hold inpatient and can resume as outpatient

## 2023-01-24 NOTE — BRIEF OPERATIVE NOTE - ASSISTANT(S)
Tiburcio MERCADO , Tatiana MERCADO MUKUND MCCARTY (No qualified resident or fellow was available to assist) , Tatiana MERCADO

## 2023-01-24 NOTE — CONSULT NOTE ADULT - SUBJECTIVE AND OBJECTIVE BOX
Patient is a 37y old  Female who presents with a chief complaint of L5-S1 HNP (2023 00:08)      HPI:  38 yo female s/p L5-S1 lami/disc 2022, since been followed by pain management doctor. Recently s/p L4-L5 and L5-S1 SABINO on  due to RLE radiculopathy. Pt presented to OSH ED this past weekend due to RLE radiculopathy, CT lumbar spine was performed and read as unremarkable. Pt discharged home with pain medication. Pt presents to [facility] ED today due to persistent RLE radiculopathy contributing to decreased urination. Pt is ambulating and is currently able to control bladder. Denies localized weakness or saddle anesthesia.   (2023 17:47)    Patient seen and examined at bedside.  Reports that prior to recent week when pain became debilitating was in her normal state of health.  Was able to perform her ADLs, walk up flight of stairs and several blocks limited by LE and back pain not CP.  No personal heart history or of stroke. Father had MI in his 50s.    Currently denies CP, SOB, fever, chills, N/V, abdominal pain, dysuria.  Has RLE pain with movement.  Of note patient reports she had skin blistering in shape of her dressing after last lumbar surgery.      REVIEW OF SYSTEMS: see HPI    PAST MEDICAL & SURGICAL HISTORY:  Lumbar herniated disc      FAMILY HISTORY:  MI in father     SOCIAL HISTORY:  Tobacco use: Never  EtOH use: None   Recreational drug use: None    MEDICATIONS:  MEDICATIONS  (STANDING):  acetaminophen     Tablet .. 1000 milliGRAM(s) Oral every 8 hours  aprepitant 40 milliGRAM(s) Oral once  chlorhexidine 2% Cloths 1 Application(s) Topical once  dexAMETHasone     Tablet 4 milliGRAM(s) Oral every 6 hours  mineral oil enema 133 milliLiter(s) Rectal once  pantoprazole    Tablet 40 milliGRAM(s) Oral before breakfast  polyethylene glycol 3350 17 Gram(s) Oral daily  povidone iodine 5% Nasal Swab 1 Application(s) Both Nostrils once  pregabalin 50 milliGRAM(s) Oral every 8 hours  senna 2 Tablet(s) Oral at bedtime  sodium chloride 0.9%. 1000 milliLiter(s) (80 mL/Hr) IV Continuous <Continuous>    MEDICATIONS  (PRN):  methocarbamol 500 milliGRAM(s) Oral every 8 hours PRN Muscle Spasm  ondansetron Injectable 4 milliGRAM(s) IV Push every 6 hours PRN Nausea and/or Vomiting  oxyCODONE    IR 5 milliGRAM(s) Oral every 4 hours PRN Moderate Pain (4 - 6)  oxyCODONE    IR 10 milliGRAM(s) Oral every 4 hours PRN Severe Pain (7 - 10)      ALLERGIES:  Allergies    No Known Allergies    Intolerances        VITAL SIGNS:  Vital Signs Last 24 Hrs  T(C): 36.8 (2023 09:00), Max: 37.1 (2023 12:33)  T(F): 98.2 (2023 09:00), Max: 98.7 (2023 12:33)  HR: 82 (2023 09:00) (65 - 106)  BP: 122/82 (2023 09:00) (109/73 - 137/89)  BP(mean): --  RR: 17 (2023 09:00) (16 - 18)  SpO2: 95% (2023 09:00) (95% - 98%)    Parameters below as of 2023 09:00  Patient On (Oxygen Delivery Method): room air        23 @ 07:23 @ 07:00  --------------------------------------------------------  IN:  Total IN: 0 mL    OUT:    Intermittent Catheterization - Urethral (mL): 875 mL    Voided (mL): 200 mL  Total OUT: 1075 mL    Total NET: -1075 mL      23 @ 07:01  23 @ 10:24  --------------------------------------------------------  IN:  Total IN: 0 mL    OUT:    Intermittent Catheterization - Urethral (mL): 450 mL  Total OUT: 450 mL    Total NET: -450 mL          PHYSICAL EXAM:  Constitutional: resting comfortably in bed; NAD  Head: NC/AT  Eyes: PERRL, EOMI, anicteric sclera  ENT: no nasal discharge; uvula midline, no oropharyngeal erythema or exudates; MMM  Neck: supple; no JVD or thyromegaly  Respiratory: CTA B/L; no W/R/R, no retractions  Cardiac: +S1/S2; RRR; no M/R/G  Gastrointestinal: abdomen soft, NT/ND; no rebound or guarding; +BSx4  Genitourinary: normal external genitalia  Back: spine midline, no bony tenderness or step-offs; no CVAT B/L  Extremities: WWP, no clubbing or cyanosis; no peripheral edema  Musculoskeletal: NROM x4; no joint swelling, tenderness or erythema  Vascular: 2+ radial, femoral, DP/PT pulses B/L  Dermatologic: skin warm, dry and intact; no rashes, wounds, or scars  Lymphatic: no submandibular or cervical LAD  Neurologic: AAOx3; CNII-XII grossly intact; no focal deficits  Psychiatric: affect and characteristics of appearance, verbalizations, behaviors are appropriate    LABS:                        12.5   10.49 )-----------( 418      ( 2023 06:58 )             38.5         137  |  102  |  15  ----------------------------<  126<H>  4.1   |  25  |  0.63    Ca    9.8      2023 06:58  Phos  3.3     -  Mg     2.2         TPro  7.6  /  Alb  4.4  /  TBili  0.4  /  DBili  x   /  AST  14  /  ALT  12  /  AlkPhos  78      PT/INR - ( 2023 17:50 )   PT: 13.1 sec;   INR: 1.10          PTT - ( 2023 17:50 )  PTT:32.0 sec  Urinalysis Basic - ( 2023 12:59 )    Color: Yellow / Appearance: Clear / S.020 / pH: x  Gluc: x / Ketone: NEGATIVE  / Bili: Negative / Urobili: 0.2 E.U./dL   Blood: x / Protein: Trace mg/dL / Nitrite: NEGATIVE   Leuk Esterase: Moderate / RBC: < 5 /HPF / WBC 5-10 /HPF   Sq Epi: x / Non Sq Epi: 5-10 /HPF / Bacteria: Present /HPF          CAPILLARY BLOOD GLUCOSE              RADIOLOGY & ADDITIONAL TESTS: Reviewed. Patient is a 37y old  Female who presents with a chief complaint of L5-S1 HNP (2023 00:08)      HPI:  38 yo female s/p L5-S1 lami/disc 2022, since been followed by pain management doctor. Recently s/p L4-L5 and L5-S1 SABINO on  due to RLE radiculopathy. Pt presented to OSH ED this past weekend due to RLE radiculopathy, CT lumbar spine was performed and read as unremarkable. Pt discharged home with pain medication. Pt presents to [facility] ED today due to persistent RLE radiculopathy contributing to decreased urination. Pt is ambulating and is currently able to control bladder. Denies localized weakness or saddle anesthesia.   (2023 17:47)    Patient seen and examined at bedside.  Reports that prior to recent week when pain became debilitating was in her normal state of health.  Was able to perform her ADLs, walk up flight of stairs and several blocks limited by LE and back pain not CP.  No personal heart history or of stroke. Father had MI in his 50s.    Currently denies CP, SOB, fever, chills, N/V, abdominal pain, dysuria.  Has RLE pain with movement.  Of note patient reports she had skin blistering in shape of her dressing after last lumbar surgery.      REVIEW OF SYSTEMS: see HPI    PAST MEDICAL & SURGICAL HISTORY:  Lumbar herniated disc      FAMILY HISTORY:  MI in father     SOCIAL HISTORY:  Tobacco use: Never  EtOH use: None   Recreational drug use: None    MEDICATIONS:  MEDICATIONS  (STANDING):  acetaminophen     Tablet .. 1000 milliGRAM(s) Oral every 8 hours  aprepitant 40 milliGRAM(s) Oral once  chlorhexidine 2% Cloths 1 Application(s) Topical once  dexAMETHasone     Tablet 4 milliGRAM(s) Oral every 6 hours  mineral oil enema 133 milliLiter(s) Rectal once  pantoprazole    Tablet 40 milliGRAM(s) Oral before breakfast  polyethylene glycol 3350 17 Gram(s) Oral daily  povidone iodine 5% Nasal Swab 1 Application(s) Both Nostrils once  pregabalin 50 milliGRAM(s) Oral every 8 hours  senna 2 Tablet(s) Oral at bedtime  sodium chloride 0.9%. 1000 milliLiter(s) (80 mL/Hr) IV Continuous <Continuous>    MEDICATIONS  (PRN):  methocarbamol 500 milliGRAM(s) Oral every 8 hours PRN Muscle Spasm  ondansetron Injectable 4 milliGRAM(s) IV Push every 6 hours PRN Nausea and/or Vomiting  oxyCODONE    IR 5 milliGRAM(s) Oral every 4 hours PRN Moderate Pain (4 - 6)  oxyCODONE    IR 10 milliGRAM(s) Oral every 4 hours PRN Severe Pain (7 - 10)      ALLERGIES:  Allergies    No Known Allergies    Intolerances        VITAL SIGNS:  Vital Signs Last 24 Hrs  T(C): 36.8 (2023 09:00), Max: 37.1 (2023 12:33)  T(F): 98.2 (2023 09:00), Max: 98.7 (2023 12:33)  HR: 82 (2023 09:00) (65 - 106)  BP: 122/82 (2023 09:00) (109/73 - 137/89)  BP(mean): --  RR: 17 (2023 09:00) (16 - 18)  SpO2: 95% (2023 09:00) (95% - 98%)    Parameters below as of 2023 09:00  Patient On (Oxygen Delivery Method): room air        23 @ 07:23 @ 07:00  --------------------------------------------------------  IN:  Total IN: 0 mL    OUT:    Intermittent Catheterization - Urethral (mL): 875 mL    Voided (mL): 200 mL  Total OUT: 1075 mL    Total NET: -1075 mL      23 @ 07:01  23 @ 10:24  --------------------------------------------------------  IN:  Total IN: 0 mL    OUT:    Intermittent Catheterization - Urethral (mL): 450 mL  Total OUT: 450 mL    Total NET: -450 mL          PHYSICAL EXAM:  Constitutional: resting comfortably in bed; NAD  Head: NC/AT  Eyes: PERRL, EOMI, anicteric sclera  ENT: no nasal discharge; MMM  Neck: supple  Respiratory: CTA B/L; no W/R/R, no retractions  Cardiac: +S1/S2; RRR; no M/R/G  Gastrointestinal: abdomen soft, NT/ND; no rebound or guarding; +BSx4  Extremities: WWP, no clubbing or cyanosis; no peripheral edema  Musculoskeletal: NROM x4; no joint swelling, tenderness or erythema  Vascular: 2+ radial, femoral, DP/PT pulses B/L  Dermatologic: skin warm, dry and intact; no rashes, wounds, or scars  Neurologic: AAOx3; CNII-XII grossly intact; no focal deficits  Psychiatric: affect and characteristics of appearance, verbalizations, behaviors are appropriate    LABS:                        12.5   10.49 )-----------( 418      ( 2023 06:58 )             38.5         137  |  102  |  15  ----------------------------<  126<H>  4.1   |  25  |  0.63    Ca    9.8      2023 06:58  Phos  3.3       Mg     2.2         TPro  7.6  /  Alb  4.4  /  TBili  0.4  /  DBili  x   /  AST  14  /  ALT  12  /  AlkPhos  78      PT/INR - ( 2023 17:50 )   PT: 13.1 sec;   INR: 1.10          PTT - ( 2023 17:50 )  PTT:32.0 sec  Urinalysis Basic - ( 2023 12:59 )    Color: Yellow / Appearance: Clear / S.020 / pH: x  Gluc: x / Ketone: NEGATIVE  / Bili: Negative / Urobili: 0.2 E.U./dL   Blood: x / Protein: Trace mg/dL / Nitrite: NEGATIVE   Leuk Esterase: Moderate / RBC: < 5 /HPF / WBC 5-10 /HPF   Sq Epi: x / Non Sq Epi: 5-10 /HPF / Bacteria: Present /HPF          CAPILLARY BLOOD GLUCOSE              RADIOLOGY & ADDITIONAL TESTS: Reviewed.  < from: MR Lumbar Spine No Cont (01.23.23 @ 15:34) >  IMPRESSION:    Very large recurrent disc herniation at right subarticular zone of L5-S1,   with extrusion compressing right S1 root and causing central spinal   stenosis. Retrolisthesis L5 is similar to before in this patient with   previous L5-S1 laminectomy.    < end of copied text >

## 2023-01-24 NOTE — CONSULT NOTE ADULT - SUBJECTIVE AND OBJECTIVE BOX
NEUROSURGERY PAIN MANAGEMENT CONSULT NOTE    Chief Complaint: LBP radiating to RLE     HPI:  36 yo female s/p L5-S1 lami/disc 2022, since been followed by pain management doctor. Recently s/p L4-L5 and L5-S1 SABINO on  due to RLE radiculopathy. Pt presented to OSH ED this past weekend due to RLE radiculopathy, CT lumbar spine was performed and read as unremarkable. Pt discharged home with pain medication. Pt presents to [facility] ED today due to persistent RLE radiculopathy contributing to decreased urination. Pt is ambulating and is currently able to control bladder. Denies localized weakness or saddle anesthesia.   (2023 17:47)      PAST MEDICAL & SURGICAL HISTORY:  Lumbar herniated disc          FAMILY HISTORY:      SOCIAL HISTORY:  [ ] Denies Smoking, Alcohol, or Drug Use    HOME MEDICATIONS:   Please refer to initial HNP    PAIN HOME MEDICATIONS:    Allergies    No Known Allergies    Intolerances        PAIN MEDICATIONS:  acetaminophen     Tablet .. 1000 milliGRAM(s) Oral every 8 hours  methocarbamol 500 milliGRAM(s) Oral every 8 hours PRN  ondansetron Injectable 4 milliGRAM(s) IV Push every 6 hours PRN  oxyCODONE    IR 5 milliGRAM(s) Oral every 4 hours PRN  oxyCODONE    IR 10 milliGRAM(s) Oral every 4 hours PRN  pregabalin 50 milliGRAM(s) Oral every 8 hours    Heme:    Antibiotics:    Cardiovascular:    GI:  pantoprazole    Tablet 40 milliGRAM(s) Oral before breakfast  polyethylene glycol 3350 17 Gram(s) Oral daily  senna 2 Tablet(s) Oral at bedtime    Endocrine:  dexAMETHasone     Tablet 4 milliGRAM(s) Oral every 6 hours    All Other Medications:  sodium chloride 0.9%. 1000 milliLiter(s) IV Continuous <Continuous>      Vital Signs Last 24 Hrs  T(C): 36.8 (2023 09:00), Max: 36.9 (2023 14:35)  T(F): 98.2 (2023 09:00), Max: 98.5 (2023 14:35)  HR: 82 (2023 09:00) (65 - 82)  BP: 122/82 (2023 09:00) (109/73 - 137/89)  BP(mean): --  RR: 17 (2023 09:00) (16 - 18)  SpO2: 95% (2023 09:00) (95% - 97%)    Parameters below as of 2023 09:00  Patient On (Oxygen Delivery Method): room air        LABS:                        12.5   10.49 )-----------( 418      ( 2023 06:58 )             38.5         137  |  102  |  15  ----------------------------<  126<H>  4.1   |  25  |  0.63    Ca    9.8      2023 06:58  Phos  3.3       Mg     2.2         TPro  7.6  /  Alb  4.4  /  TBili  0.4  /  DBili  x   /  AST  14  /  ALT  12  /  AlkPhos  78  23    PT/INR - ( 2023 17:50 )   PT: 13.1 sec;   INR: 1.10          PTT - ( 2023 17:50 )  PTT:32.0 sec  Urinalysis Basic - ( 2023 12:59 )    Color: Yellow / Appearance: Clear / S.020 / pH: x  Gluc: x / Ketone: NEGATIVE  / Bili: Negative / Urobili: 0.2 E.U./dL   Blood: x / Protein: Trace mg/dL / Nitrite: NEGATIVE   Leuk Esterase: Moderate / RBC: < 5 /HPF / WBC 5-10 /HPF   Sq Epi: x / Non Sq Epi: 5-10 /HPF / Bacteria: Present /HPF        RADIOLOGY:    Drug Screen:        REVIEW OF SYSTEMS:  CONSTITUTIONAL: No fever or fatigue O/N.   EYES: No eye pain, visual disturbances  ENMT:  No difficulty hearing. No throat pain  NECK: No pain or stiffness  RESPIRATORY: No cough, wheezing; No shortness of breath  CARDIOVASCULAR: No chest pain, palpitations.   GASTROINTESTINAL: Pt reports passing gas. No bowel movements. No abdominal or epigastric pain. No nausea, vomiting. GENITOURINARY: No dysuria, frequency, or incontinence  NEUROLOGICAL: No headaches, loss of strength, numbness, or tremors. No dizzinesss or lightheadedness with pain medications.   MUSCULOSKELETAL: Incisional back pain. No joint pain or swelling; No muscle, or extremity pain    PAIN ASSESSMENT: c/o LBP and severe RLE pain, decreased sensation in R groin and R lower extremity     PHYSICAL EXAM  GENERAL: Laying in bed, NAD  Neuro: CN II-XII PERRL, EOMI  Cranial nerves grossly intact  Motor exam: MAEx4  Sensation intact to LT in UE/LE in 3 dermatomes  CHEST/LUNG: Clear to auscultation bilaterally; No rales, rhonchi, wheezing, or rubs  HEART: Regular rate and rhythm; No murmurs, rubs, or gallops  ABDOMEN: Soft, Nontender, Nondistended; Bowel sounds present  EXTREMITIES:  2+ Peripheral Pulses, No clubbing, cyanosis, or edema  SKIN: No rashes or lesions      ASSESSMENT:   36 yo female s/p L5-S1 lami/disc 2022, presenting with persistent RLE radiculopathy. MRI lumbar spine reveals large herniated disc at L5-S1.     PLAN:   - Pain:  ERAS post op:  Tylenol 1000mg every 8 hours  Lyrica 50mg every 8 hours  Robaxin 500mg every 8 hours  Oxycodone 5mg every 4 hours as needed for severe pain     - Bowel regimen: Senna    - Nausea ppx: Zofran standing  - Functional Goals: Pt will get OOB with PT today. Pt will resume previous level of activity without impairment from surgery.   - Additional Consults: None recommended.   - Additional Labs/Imaging:  None recommended.   - Follow up, Discharge Planning: pending post op PT/OT   - Pain Management follow up plan: will cont to follow    d/w Dr. Dick

## 2023-01-24 NOTE — CONSULT NOTE ADULT - PROBLEM SELECTOR RECOMMENDATION 2
CC: Office Visit (patient c/o migraines and chills for the last 4 to 5 days.)    SUBJECTIVE  Rachael Tee is a 69 year old female who c/o a 4-day history of rhinorrhea, cough, fever, frontal headache. She denies vomiting, diarrhea, loss of taste or smell. She has not been exposed to COVID-19 as far as she knows.    Rachael also c/o a severe dry rash with fissuring. She says her hands are very sore due to her cracked dry skin. She has previously had this problem with her feet but now it is bothering her hands.     Rachael is under the care of a pain specialist for her fibromyalgia and chronic low back pain. She is still taking Fentanyl patches 12 mcg/hr.    Rachael has been under stress due to her sister having had a recent stroke. This has caused her seizures to recur after being dormant for a long time. She says that the seizures are becoming less over time. She continues to be depressed; she takes duloxetine for this as well as trazodone at night.    Rachael also c/o constipation from the fentanyl, for which she takes linzess. She also has occasional diarrhea for which she takes lomotil.     Rachael admits to her BP being out of control whenever she sees a doctor. She continues to take propranolol ER 60 mg QD. Her repeat BP taken in office was 188/88.    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     • Tachycardia 02/08/2022     Priority: Low       PREVENTATIVE  Mammogram: Referral given 7/15/2021  Colonoscopy: Refused 7/15/2021  Pap smear: N/a s/p hysterectomy  Yearly: Done 7/15/2021    MEDICATIONS  Current Outpatient Medications   Medication Sig Dispense Refill   • fentaNYL (DURAGESIC) 12 MCG/HR patch APPLY 1 PATCH TOPICALLY TO THE SKIN EVERY 72 HOURS     • amitriptyline (ELAVIL) 10 MG tablet Take 10 mg by mouth nightly.     • diphenoxylate-atropine (Lomotil) 2.5-0.025 MG tablet Take 1 tablet by mouth daily as needed for Diarrhea. 15 tablet 0   • OXcarbazepine (TRILEPTAL) 300 MG tablet Take 1 tablet by mouth 3 times daily. 90 tablet 2   • DULoxetine (CYMBALTA) 60 MG capsule Take 1 capsule by mouth daily. 30 capsule 2   • meloxicam (MOBIC) 7.5 MG tablet TAKE 1 TABLET BY MOUTH EVERY DAY AS NEEDED 30 tablet 0   • levothyroxine 88 MCG tablet Take 88 mcg by mouth daily.     • linaclotide (Linzess) 145 MCG capsule Take by mouth as needed.     • propranolol (INDERAL LA) 60 MG 24 hr capsule Take 60 mg by mouth daily.     • traZODone (DESYREL) 100 MG tablet Take 200 mg by mouth nightly.     • amlodipine-benazepril (Lotrel) 5-10 MG per capsule Take 1 capsule by mouth daily. 30 capsule 11     No current facility-administered medications for this visit.       ALLERGIES   ALLERGIES:  Adhesive   (environmental), Chocolate flavor   (food or med), Cleocin, and Garlic   (food or med)      REVIEW OF SYSTEMS  Pertinent positives and pertinent negatives noted in the HPI. All other systems reviewed and negative.    OBJECTIVE  Visit Vitals  BP (!) 177/82 (BP Location: LUE - Left upper extremity, Patient Position: Sitting, Cuff Size: Large  Adult)   Pulse (!) 117   Temp 95.3 °F (35.2 °C) (Temporal)   Resp 16   Ht 5' 8\" (1.727 m)   SpO2 95%     There is no height or weight on file to calculate BMI.    PHYSICAL EXAM  General: Pleasant and comfortable, no apparent distress, obese.  Skin: Palms of both hands have thickened, dry skin with fissures. No sign of infection. Skin color and turgor normal. No rashes.  Head: Normocephalic, atraumatic  Eyes: Conjunctivae and sclerae normal. No erythema, edema or drainage  Ears: External ears normal. Canals clear. TM's normal  Nose/Sinuses: Nares normal. Mucosa normal. No drainage or sinus tenderness.  Oropharynx: Lips, mucosa, tongue, teeth and gums normal. Oropharynx pink and moist.  Neck: No adenopathy, trachea midline  Lungs: CTA throughout without crackles, rhonchi, or wheezes. Normal respiratory effort  Heart: RRR without S3, S4, or murmurs. The PMI is not displaced  Abdomen/Back: Soft, non-tender, no masses, organomegaly, rebound or guarding  Extremities: Extremities normal. No deformities, edema, or skin discoloration  Peripheral Pulses: Carotid=4/4, posterior tibial=4/4    RELEVANT LABS  No results found for: HEMOGLOBINAO  No results found for: CHOLESTEROLT, HDLCHOLESTER, LDLCHOLCALCU, TRIGLYCERIDE      ASSESSMENT/PLAN  Diagnoses and associated orders for this visit:  1. Viral URI  Comments:  - Will check COVID-19 testing and influenza testing  - Pt should isolate herself until results are known  Orders:  -     POCT SARS-COV-2 ANTIGEN  -     2019 Novel Coronavirus (SARS-CoV-2)  -     POCT Flu, Influenza, Rapid A/B  2. Chronic low back pain, unspecified back pain laterality, unspecified whether sciatica present  Comments:  - Pt should continue her fentanyl patch  3. Fibromyalgia  Comments:  - Pt should continue her fentanyl patch  4. Primary hypertension  Comments:  - Uncontrolled; she has not taken her propranolol today  - Reminded pt to take inderall when she gets home  - Also will start generic lotrel 5-10  mg QD.  Orders:  -     amLODIPine Besy-Benazepril HCl  5. Mild episode of recurrent major depressive disorder (CMS/HCC)  Comments:  - Pt to continue cymbalta and trazodone  6. Nonintractable epilepsy with status epilepticus, unspecified epilepsy type (CMS/HCC)  Comments:  - Uncontrolled due to stress  - Will continue oxcarbazepine 300 mg TID  7. Prediabetes  Comments:  - Encouraged pt to lose weight  8. Primary insomnia  Comments:  - Continue trazodone  9. Acquired hypothyroidism  Comments:  - Continue levothyroxine  10. Tachycardia  Comments:  - Reminded pt to take inderall when she gets home  11. Diarrhea, unspecified type  -     Diphenoxylate-Atropine        Return in about 1 month (around 3/8/2022).    Call or return to clinic as needed if these symptoms worsen, fail to improve as anticipated, or if new symptoms develop.    By signing below, I, Marcos Tate, attest that this documentation has been prepared in the presence of and under the direction of Dr. Jose Mendosa.    Electronically signed by Marcos Tate, 2/8/2022.    I, Jose Mendosa, personally performed the services described in this documentation. All medical record entries made by the scribe were at my direction and in my presence. I have reviewed the chart and agree that the record reflects my personal performance and is accurate and complete.    Electronically signed by Jose Mendosa MD; 2/8/2022.     RCRI I, Class II risk, Rodriguez 0.1% risk  METs > 4, limited only by back pain  EKG NSR and without significant change from 6/22  Low to intermediate risk for intermediate risk procedure  - no further cardiac workup indicated, medically optimized for OR

## 2023-01-24 NOTE — CONSULT NOTE ADULT - ASSESSMENT
36 yo F with hx of obesity, gestational HTN, and s/p L5-S1 lami/disc 06/22 who presented with persistent RLE radiculopathy found to have large herniated disc L5-S1 and plan for OR

## 2023-01-24 NOTE — LACTATION INITIAL EVALUATION - NS LACT CON REASON FOR REQ
Requested to provide a breast pump for pt having spinal surgery today. Pt has an 18 months old at home that breastfeeds 2x day. Mother states she wants to continue to breastfeed post surgery but is not sure what medications will be prohibit her to continue after her procedure. I provided the pt with both a hand pump and a double electric pump to use per her preference. and post sx condition. Pt states she will pump and discard her milk until she goes home as she lives nearly an hour away and daily milk collection from her spouse won't be possible . All questions answered and pt verbalized understanding instructions and is agreeable with recommendations. Pt's primary nurse updated on pt status and plan. LC to follow up prn post surgery./general questions without assessment/hospital readmission/staff request

## 2023-01-25 ENCOUNTER — TRANSCRIPTION ENCOUNTER (OUTPATIENT)
Age: 38
End: 2023-01-25

## 2023-01-25 DIAGNOSIS — D72.829 ELEVATED WHITE BLOOD CELL COUNT, UNSPECIFIED: ICD-10-CM

## 2023-01-25 DIAGNOSIS — D62 ACUTE POSTHEMORRHAGIC ANEMIA: ICD-10-CM

## 2023-01-25 LAB
A1C WITH ESTIMATED AVERAGE GLUCOSE RESULT: 5.5 % — SIGNIFICANT CHANGE UP (ref 4–5.6)
ANION GAP SERPL CALC-SCNC: 8 MMOL/L — SIGNIFICANT CHANGE UP (ref 5–17)
BUN SERPL-MCNC: 13 MG/DL — SIGNIFICANT CHANGE UP (ref 7–23)
CALCIUM SERPL-MCNC: 9.2 MG/DL — SIGNIFICANT CHANGE UP (ref 8.4–10.5)
CHLORIDE SERPL-SCNC: 104 MMOL/L — SIGNIFICANT CHANGE UP (ref 96–108)
CO2 SERPL-SCNC: 28 MMOL/L — SIGNIFICANT CHANGE UP (ref 22–31)
CREAT SERPL-MCNC: 0.65 MG/DL — SIGNIFICANT CHANGE UP (ref 0.5–1.3)
CRP SERPL-MCNC: 15.7 MG/L — HIGH (ref 0–4)
EGFR: 116 ML/MIN/1.73M2 — SIGNIFICANT CHANGE UP
ERYTHROCYTE [SEDIMENTATION RATE] IN BLOOD: 50 MM/HR — HIGH
ESTIMATED AVERAGE GLUCOSE: 111 MG/DL — SIGNIFICANT CHANGE UP (ref 68–114)
GLUCOSE BLDC GLUCOMTR-MCNC: 128 MG/DL — HIGH (ref 70–99)
GLUCOSE BLDC GLUCOMTR-MCNC: 154 MG/DL — HIGH (ref 70–99)
GLUCOSE BLDC GLUCOMTR-MCNC: 155 MG/DL — HIGH (ref 70–99)
GLUCOSE SERPL-MCNC: 124 MG/DL — HIGH (ref 70–99)
HCT VFR BLD CALC: 32.9 % — LOW (ref 34.5–45)
HGB BLD-MCNC: 10.6 G/DL — LOW (ref 11.5–15.5)
MAGNESIUM SERPL-MCNC: 1.9 MG/DL — SIGNIFICANT CHANGE UP (ref 1.6–2.6)
MCHC RBC-ENTMCNC: 27.7 PG — SIGNIFICANT CHANGE UP (ref 27–34)
MCHC RBC-ENTMCNC: 32.2 GM/DL — SIGNIFICANT CHANGE UP (ref 32–36)
MCV RBC AUTO: 86.1 FL — SIGNIFICANT CHANGE UP (ref 80–100)
NRBC # BLD: 0 /100 WBCS — SIGNIFICANT CHANGE UP (ref 0–0)
PHOSPHATE SERPL-MCNC: 3.2 MG/DL — SIGNIFICANT CHANGE UP (ref 2.5–4.5)
PLATELET # BLD AUTO: 393 K/UL — SIGNIFICANT CHANGE UP (ref 150–400)
POTASSIUM SERPL-MCNC: 4.1 MMOL/L — SIGNIFICANT CHANGE UP (ref 3.5–5.3)
POTASSIUM SERPL-SCNC: 4.1 MMOL/L — SIGNIFICANT CHANGE UP (ref 3.5–5.3)
RBC # BLD: 3.82 M/UL — SIGNIFICANT CHANGE UP (ref 3.8–5.2)
RBC # FLD: 13.4 % — SIGNIFICANT CHANGE UP (ref 10.3–14.5)
SODIUM SERPL-SCNC: 140 MMOL/L — SIGNIFICANT CHANGE UP (ref 135–145)
WBC # BLD: 12.69 K/UL — HIGH (ref 3.8–10.5)
WBC # FLD AUTO: 12.69 K/UL — HIGH (ref 3.8–10.5)

## 2023-01-25 PROCEDURE — 99233 SBSQ HOSP IP/OBS HIGH 50: CPT

## 2023-01-25 RX ORDER — SODIUM CHLORIDE 9 MG/ML
1000 INJECTION INTRAMUSCULAR; INTRAVENOUS; SUBCUTANEOUS ONCE
Refills: 0 | Status: COMPLETED | OUTPATIENT
Start: 2023-01-25 | End: 2023-01-25

## 2023-01-25 RX ORDER — MAGNESIUM OXIDE 400 MG ORAL TABLET 241.3 MG
400 TABLET ORAL ONCE
Refills: 0 | Status: COMPLETED | OUTPATIENT
Start: 2023-01-25 | End: 2023-01-25

## 2023-01-25 RX ORDER — LACTULOSE 10 G/15ML
10 SOLUTION ORAL ONCE
Refills: 0 | Status: COMPLETED | OUTPATIENT
Start: 2023-01-25 | End: 2023-01-25

## 2023-01-25 RX ORDER — ENOXAPARIN SODIUM 100 MG/ML
40 INJECTION SUBCUTANEOUS
Refills: 0 | Status: DISCONTINUED | OUTPATIENT
Start: 2023-01-25 | End: 2023-01-27

## 2023-01-25 RX ORDER — INFLUENZA VIRUS VACCINE 15; 15; 15; 15 UG/.5ML; UG/.5ML; UG/.5ML; UG/.5ML
0.5 SUSPENSION INTRAMUSCULAR ONCE
Refills: 0 | Status: DISCONTINUED | OUTPATIENT
Start: 2023-01-25 | End: 2023-01-27

## 2023-01-25 RX ADMIN — Medication 4 MILLIGRAM(S): at 11:02

## 2023-01-25 RX ADMIN — POLYETHYLENE GLYCOL 3350 17 GRAM(S): 17 POWDER, FOR SOLUTION ORAL at 11:02

## 2023-01-25 RX ADMIN — Medication 1000 MILLIGRAM(S): at 06:24

## 2023-01-25 RX ADMIN — Medication 5 MILLIGRAM(S): at 17:30

## 2023-01-25 RX ADMIN — OXYCODONE HYDROCHLORIDE 10 MILLIGRAM(S): 5 TABLET ORAL at 08:29

## 2023-01-25 RX ADMIN — SODIUM CHLORIDE 1000 MILLILITER(S): 9 INJECTION INTRAMUSCULAR; INTRAVENOUS; SUBCUTANEOUS at 12:18

## 2023-01-25 RX ADMIN — Medication 1000 MILLIGRAM(S): at 21:39

## 2023-01-25 RX ADMIN — OXYCODONE HYDROCHLORIDE 10 MILLIGRAM(S): 5 TABLET ORAL at 05:21

## 2023-01-25 RX ADMIN — METHOCARBAMOL 500 MILLIGRAM(S): 500 TABLET, FILM COATED ORAL at 06:23

## 2023-01-25 RX ADMIN — Medication 1000 MILLIGRAM(S): at 07:24

## 2023-01-25 RX ADMIN — PANTOPRAZOLE SODIUM 40 MILLIGRAM(S): 20 TABLET, DELAYED RELEASE ORAL at 06:24

## 2023-01-25 RX ADMIN — OXYCODONE HYDROCHLORIDE 10 MILLIGRAM(S): 5 TABLET ORAL at 15:55

## 2023-01-25 RX ADMIN — Medication 100 MILLIGRAM(S): at 06:25

## 2023-01-25 RX ADMIN — ENOXAPARIN SODIUM 40 MILLIGRAM(S): 100 INJECTION SUBCUTANEOUS at 21:40

## 2023-01-25 RX ADMIN — Medication 4 MILLIGRAM(S): at 04:21

## 2023-01-25 RX ADMIN — Medication 4 MILLIGRAM(S): at 17:30

## 2023-01-25 RX ADMIN — MAGNESIUM OXIDE 400 MG ORAL TABLET 400 MILLIGRAM(S): 241.3 TABLET ORAL at 11:01

## 2023-01-25 RX ADMIN — ONDANSETRON 4 MILLIGRAM(S): 8 TABLET, FILM COATED ORAL at 17:30

## 2023-01-25 RX ADMIN — LACTULOSE 10 GRAM(S): 10 SOLUTION ORAL at 17:29

## 2023-01-25 RX ADMIN — Medication 4 MILLIGRAM(S): at 21:39

## 2023-01-25 RX ADMIN — Medication 50 MILLIGRAM(S): at 13:04

## 2023-01-25 RX ADMIN — Medication 1000 MILLIGRAM(S): at 14:04

## 2023-01-25 RX ADMIN — Medication 5 MILLIGRAM(S): at 06:24

## 2023-01-25 RX ADMIN — METHOCARBAMOL 500 MILLIGRAM(S): 500 TABLET, FILM COATED ORAL at 21:39

## 2023-01-25 RX ADMIN — OXYCODONE HYDROCHLORIDE 10 MILLIGRAM(S): 5 TABLET ORAL at 09:29

## 2023-01-25 RX ADMIN — METHOCARBAMOL 500 MILLIGRAM(S): 500 TABLET, FILM COATED ORAL at 13:04

## 2023-01-25 RX ADMIN — ONDANSETRON 4 MILLIGRAM(S): 8 TABLET, FILM COATED ORAL at 11:02

## 2023-01-25 RX ADMIN — Medication 50 MILLIGRAM(S): at 21:39

## 2023-01-25 RX ADMIN — Medication 50 MILLIGRAM(S): at 06:24

## 2023-01-25 RX ADMIN — OXYCODONE HYDROCHLORIDE 10 MILLIGRAM(S): 5 TABLET ORAL at 15:24

## 2023-01-25 RX ADMIN — Medication 1000 MILLIGRAM(S): at 13:04

## 2023-01-25 RX ADMIN — Medication 10 MILLIGRAM(S): at 17:30

## 2023-01-25 RX ADMIN — SENNA PLUS 2 TABLET(S): 8.6 TABLET ORAL at 21:39

## 2023-01-25 RX ADMIN — Medication 1000 MILLIGRAM(S): at 22:30

## 2023-01-25 RX ADMIN — OXYCODONE HYDROCHLORIDE 10 MILLIGRAM(S): 5 TABLET ORAL at 04:21

## 2023-01-25 NOTE — PHYSICAL THERAPY INITIAL EVALUATION ADULT - ADDITIONAL COMMENTS
Pt lives with her  and kids in a house with multiple SONIA. At baseline, ambulates independently with no DME, however reports that for the last few days she has been basically bed ridden due to radiating back pain. Pt owns RW from previous surgery.

## 2023-01-25 NOTE — PHYSICAL THERAPY INITIAL EVALUATION ADULT - PERTINENT HX OF CURRENT PROBLEM, REHAB EVAL
36 yo female s/p L5-S1 lami/disc 06/2022, presenting with persistent RLE radiculopathy and urinary retention. MRI lumbar spine reveals large herniated disc at L5-S1.

## 2023-01-25 NOTE — DISCHARGE NOTE PROVIDER - HOSPITAL COURSE
HPI:  38 yo female s/p L5-S1 lami/disc 06/2022, since been followed by pain management doctor. Recently s/p L4-L5 and L5-S1 SABINO on 01/14 due to RLE radiculopathy. Pt presented to OSH ED this past weekend due to RLE radiculopathy, CT lumbar spine was performed and read as unremarkable. Pt discharged home with pain medication. Pt presents to [facility] ED today due to persistent RLE radiculopathy contributing to decreased urination. Pt is ambulating and is currently able to control bladder. Denies localized weakness or saddle anesthesia.    Hospital Course:  1/23: Admitted for severe back pain, recurrent herniated disc, pain control overnight with plan for OR this week  1/24: Straight cath for urinary retention. Enema given in AM. c/o new R posterior thigh and R groin, plan for OR today with Dr. Horner for L5-S1 TLIF.   1/25: POD1 L5-S1 TLIF     Patient evaluated by PT/OT who recommended:  Patient is going home? rehab? hospice? Facility Name:     Hospital course c/b:     Exam on day of discharge:    Checklist:   - Obtained follow up appointment from NP  - Reviewed final recommendations of inpatient consults  - review discharge planning on provider handoff  - post op imaging completed  - Neurologically stable for discharge  - Vitals stable for discharge   - Afebrile for discharge  - WBC is stable  - Sodium level is normal  - Pain is adequately controlled  - Pt has PICC/walker/brace/collar   - LACE score (10 or > needs PCP apt)      HPI:  36 yo female s/p L5-S1 lami/disc 06/2022, since been followed by pain management doctor. Recently s/p L4-L5 and L5-S1 SABINO on 01/14 due to RLE radiculopathy. Pt presented to OSH ED this past weekend due to RLE radiculopathy, CT lumbar spine was performed and read as unremarkable. Pt discharged home with pain medication. Pt presents to [facility] ED today due to persistent RLE radiculopathy contributing to decreased urination. Pt is ambulating and is currently able to control bladder. Denies localized weakness or saddle anesthesia.    Hospital Course:  1/23: Admitted for severe back pain, recurrent herniated disc, pain control overnight with plan for OR this week  1/24: Straight cath for urinary retention. Enema given in AM. c/o new R posterior thigh and R groin, plan for OR today with Dr. Horner for L5-S1 TLIF.   1/25: POD1 L5-S1 TLIF   1/26: POD 2 L5-S1 TLIF, VERITO o/n. improving R S1 dorsal foot numbness, Lyrica decreased to 50mg BID, d/c oxycodone 10mg, patient c/o of no BM x 1 week, received enema this morning and had BM, dressing removed this morning incision clean/dry/intact. standing xrays complete, Staples in place.   1/27: POD 3. VERITO overnight, pend home no needs when medically ready. F/u [facility] and JEAN CLAUDE output. BM yest. Sensation improved.     Patient evaluated by PT/OT who recommended: home no needs  Patient is going home    Hospital course c/b: N/A    Exam on day of discharge:  GEN: laying in bed, appears well, NAD  NEURO: AOx3. FC, OE spont, speech intact, face symmetric. CNII-XII intact. PERRL, EOMI. No pronator drift. RLE 4+/5 o/w 5/5 strength throughout. decreased sensation R foot except big toe o/w SILT  CV: RRR +S1/S2  PULM: CTAB  GI: Abd soft, NT/ND  EXT: ext warm, dry, nontender  WOUND: lumbar incision c/d/i    Checklist:   - Obtained follow up appointment from NP  - Reviewed final recommendations of inpatient consults  - review discharge planning on provider handoff  - post op imaging completed  - Neurologically stable for discharge  - Vitals stable for discharge   - Afebrile for discharge  - WBC is stable  - Sodium level is normal  - Pain is adequately controlled  - Pt has PICC/walker/brace/collar   - LACE score (10 or > needs PCP apt)

## 2023-01-25 NOTE — CHART NOTE - NSCHARTNOTEFT_GEN_A_CORE
SQL started. Orthostatic with PT today. Given 1L bolus NS. Vickers removed in AM and required straight cath. SQL started. Orthostatic with PT today. Given 1L bolus NS. Vickers removed in AM and required straight cath. Patient able to void in afternoon.

## 2023-01-25 NOTE — DISCHARGE NOTE PROVIDER - PROVIDER TOKENS
PROVIDER:[TOKEN:[[id number]]] PROVIDER:[TOKEN:[[id number]]],PROVIDER:[TOKEN:[[id number]]] PROVIDER:[TOKEN:[[id number]],SCHEDULEDAPPT:[02/10/2023],SCHEDULEDAPPTTIME:[11:00 AM]],PROVIDER:[TOKEN:[[id number]]]

## 2023-01-25 NOTE — OCCUPATIONAL THERAPY INITIAL EVALUATION ADULT - LIVES WITH, PROFILE
Pt lives with wife and children in private house with steps to enter. Pt reports prior to increase in pain, ~1 week ago pt was ind for all ADLs and functional mobility however since onset of pain pt has been bed ridden prompting to go to ED. Pt has RW and shower chair however does not need it./children/spouse

## 2023-01-25 NOTE — OCCUPATIONAL THERAPY INITIAL EVALUATION ADULT - DIAGNOSIS, OT EVAL
Pt present with post op pain with decrease R foot sensation (since June) demonstrating decrease in balance and strength affecting ADLs and functional mobility.

## 2023-01-25 NOTE — PHYSICAL THERAPY INITIAL EVALUATION ADULT - MANUAL MUSCLE TESTING RESULTS, REHAB EVAL
functional mobility testing; >/=3+/5 bilateral UE and LE (LLE DF and PF 5/5, RLE DF 4+/5 and PF 5/5)/grossly assessed due to

## 2023-01-25 NOTE — DISCHARGE NOTE PROVIDER - NSDCFUADDAPPT_GEN_ALL_CORE_FT
Please follow up with Dr. Horner    Please follow up with Dr. Dick if needed for pain management    Please follow up with your primary care doctor  You have an appointment with Dr. Horner 2/10/23 at 11am. Please call the office to confirm or if you need to reschedule.    Please follow up with Dr. Dick if needed for pain management    Please follow up with your primary care doctor

## 2023-01-25 NOTE — PROGRESS NOTE ADULT - SUBJECTIVE AND OBJECTIVE BOX
NEUROSURGERY PAIN MANAGEMENT CONSULT NOTE    Chief Complaint: LBP radiating to RLE     HPI:  38 yo female s/p L5-S1 lami/disc 2022, since been followed by pain management doctor. Recently s/p L4-L5 and L5-S1 SABINO on  due to RLE radiculopathy. Pt presented to OSH ED this past weekend due to RLE radiculopathy, CT lumbar spine was performed and read as unremarkable. Pt discharged home with pain medication. Pt presents to [facility] ED today due to persistent RLE radiculopathy contributing to decreased urination. Pt is ambulating and is currently able to control bladder. Denies localized weakness or saddle anesthesia.   (2023 17:47)      PAST MEDICAL & SURGICAL HISTORY:  Lumbar herniated disc          FAMILY HISTORY:      SOCIAL HISTORY:  [ ] Denies Smoking, Alcohol, or Drug Use    HOME MEDICATIONS:   Please refer to initial HNP    PAIN HOME MEDICATIONS:    Allergies    adhesives (Blisters)  No Known Drug Allergies    Intolerances        PAIN MEDICATIONS:  acetaminophen     Tablet .. 1000 milliGRAM(s) Oral every 8 hours  methocarbamol 500 milliGRAM(s) Oral every 8 hours  ondansetron    Tablet 4 milliGRAM(s) Oral every 6 hours  oxyCODONE    IR 5 milliGRAM(s) Oral every 4 hours PRN  oxyCODONE    IR 10 milliGRAM(s) Oral every 4 hours PRN  pregabalin 50 milliGRAM(s) Oral every 8 hours    Heme:    Antibiotics:    Cardiovascular:    GI:  bisacodyl 5 milliGRAM(s) Oral every 12 hours  bisacodyl Suppository 10 milliGRAM(s) Rectal once  pantoprazole    Tablet 40 milliGRAM(s) Oral before breakfast  polyethylene glycol 3350 17 Gram(s) Oral daily  senna 2 Tablet(s) Oral at bedtime    Endocrine:  dexAMETHasone     Tablet 4 milliGRAM(s) Oral every 6 hours  dextrose 50% Injectable 25 Gram(s) IV Push once  dextrose 50% Injectable 12.5 Gram(s) IV Push once  dextrose 50% Injectable 25 Gram(s) IV Push once  dextrose Oral Gel 15 Gram(s) Oral once PRN  glucagon  Injectable 1 milliGRAM(s) IntraMuscular once  insulin lispro (ADMELOG) corrective regimen sliding scale   SubCutaneous three times a day before meals    All Other Medications:  dextrose 5%. 1000 milliLiter(s) IV Continuous <Continuous>  dextrose 5%. 1000 milliLiter(s) IV Continuous <Continuous>  magnesium oxide 400 milliGRAM(s) Oral once      Vital Signs Last 24 Hrs  T(C): 36.6 (2023 08:40), Max: 37.1 (2023 00:21)  T(F): 97.8 (2023 08:40), Max: 98.7 (2023 00:21)  HR: 73 (2023 08:40) (73 - 106)  BP: 121/81 (2023 08:40) (107/75 - 127/82)  BP(mean): 92 (2023 19:54) (90 - 100)  RR: 17 (2023 08:40) (12 - 17)  SpO2: 98% (2023 08:40) (95% - 100%)    Parameters below as of 2023 08:40  Patient On (Oxygen Delivery Method): room air        LABS:                        10.6   12.69 )-----------( 393      ( 2023 05:30 )             32.9     01-25    140  |  104  |  13  ----------------------------<  124<H>  4.1   |  28  |  0.65    Ca    9.2      2023 05:30  Phos  3.2     01-25  Mg     1.9     25    TPro  7.6  /  Alb  4.4  /  TBili  0.4  /  DBili  x   /  AST  14  /  ALT  12  /  AlkPhos  78  01-23    PT/INR - ( 2023 17:50 )   PT: 13.1 sec;   INR: 1.10          PTT - ( 2023 17:50 )  PTT:32.0 sec  Urinalysis Basic - ( 2023 12:59 )    Color: Yellow / Appearance: Clear / S.020 / pH: x  Gluc: x / Ketone: NEGATIVE  / Bili: Negative / Urobili: 0.2 E.U./dL   Blood: x / Protein: Trace mg/dL / Nitrite: NEGATIVE   Leuk Esterase: Moderate / RBC: < 5 /HPF / WBC 5-10 /HPF   Sq Epi: x / Non Sq Epi: 5-10 /HPF / Bacteria: Present /HPF        RADIOLOGY:    Drug Screen:        REVIEW OF SYSTEMS:  CONSTITUTIONAL: No fever or fatigue O/N.   EYES: No eye pain, visual disturbances  ENMT:  No difficulty hearing. No throat pain  NECK: No pain or stiffness  RESPIRATORY: No cough, wheezing; No shortness of breath  CARDIOVASCULAR: No chest pain, palpitations.   GASTROINTESTINAL: Pt reports passing gas. No bowel movements. No abdominal or epigastric pain. No nausea, vomiting. GENITOURINARY: No dysuria, frequency, or incontinence  NEUROLOGICAL: No headaches, loss of strength, numbness, or tremors. No dizziness or lightheadedness with pain medications.   MUSCULOSKELETAL: Incisional back pain. No joint pain or swelling; No muscle, or extremity pain    PAIN ASSESSMENT: pain improved post op, well controlled on current regiment      PHYSICAL EXAM  GENERAL: Laying in bed, NAD  Neuro: CN II-XII PERRL, EOMI  Cranial nerves grossly intact  Motor exam: MAEx 4  Sensation intact to LT in UE/LE in 3 dermatomes  CHEST/LUNG: Clear to auscultation bilaterally; No rales, rhonchi, wheezing, or rubs  HEART: Regular rate and rhythm; No murmurs, rubs, or gallops  ABDOMEN: Soft, Nontender, Nondistended; Bowel sounds present  EXTREMITIES:  2+ Peripheral Pulses, No clubbing, cyanosis, or edema  SKIN: No rashes or lesions      ASSESSMENT:   38 yo female s/p L5-S1 lami/disc 2022, presenting with persistent RLE radiculopathy. MRI lumbar spine reveals large herniated disc at L5-S1.       PLAN:   - Pain:  Tylenol 1000mg every 8 hours  Lyrica 50mg every 8 hours  Robaxin 500mg every 8 hours  Oxycodone 5mg every 4 hours as needed for severe pain     - Bowel regimen: Senna    - Nausea ppx: Zofran standing  - Functional Goals: Pt will get OOB with PT today. Pt will resume previous level of activity without impairment from surgery.   - Additional Consults: None recommended.   - Additional Labs/Imaging:  None recommended.   - Follow up, Discharge Planning: pending PT/OT   - Pain Management follow up plan: will continue to follow    d/w Dr. Dick

## 2023-01-25 NOTE — DISCHARGE NOTE PROVIDER - CARE PROVIDER_API CALL
Vance Horner)  Neurosurgery  [address] [address]  Phone: [phone]  Fax: [phone]  Follow Up Time:    Vance Horner)  Neurosurgery  [address] [address]  Phone: [phone]  Fax: [phone]  Follow Up Time:     Deepak Dick)  Anesthesiology; Pain Medicine  [address] [place] [address]  Phone: [phone]  Fax: [phone]  Follow Up Time:    Vance Horner)  Neurosurgery  [address] [address]  Phone: [phone]  Fax: [phone]  Scheduled Appointment: 02/10/2023 11:00 AM    Deepak Dick)  Anesthesiology; Pain Medicine  [address] [place] [address]  Phone: [phone]  Fax: [phone]  Follow Up Time:

## 2023-01-25 NOTE — PROGRESS NOTE ADULT - SUBJECTIVE AND OBJECTIVE BOX
Patient is a 37y old  Female who presents with a chief complaint of L5-S1 HNP (2023 09:48)      SUBJECTIVE:  Patient seen and examined at bedside.  Feels that nerve pain has resolved and now only has post surgical pain which is improved from preop pain levels.  Passing gas but not BM yet although had enema prior to OR yesterday so unsure if had BM intraop.  Tolerating PO but low appetite, eager to work with PT today    ROS:  Denies fevers, chills, headache, vision changes, neck pain, cough, SOB, chest pain, Abdominal pain, N/V, dysuria or new rash.  All other ROS negative except as above    Vital Signs Last 12 Hrs  T(F): 97.8 (23 @ 08:40), Max: 98.7 (23 @ 00:21)  HR: 73 (23 @ 08:40) (73 - 85)  BP: 121/81 (23 @ 08:40) (107/75 - 121/81)  BP(mean): --  RR: 17 (23 @ 08:40) (16 - 17)  SpO2: 98% (23 @ 08:40) (96% - 98%)  I&O's Summary    2023 07:01  -  2023 07:00  --------------------------------------------------------  IN: 160 mL / OUT: 2525 mL / NET: -2365 mL        PHYSICAL EXAM:  Constitutional: NAD, comfortable in bed.  HEENT: PERRLA, EOMI, no conjunctival pallor or scleral icterus, MMM  Neck: Supple, no JVD  Respiratory: CTA B/L. No w/r/r.   Cardiovascular: RRR, normal S1 and S2, no m/r/g.   Gastrointestinal: +BS, soft NTND, no guarding or rebound tenderness, no palpable masses   Extremities: wwp; no cyanosis, clubbing or edema.   Back: drains in place  Vascular: Pulses equal and strong throughout.   Neurological: AAOx3, no CN deficits, strength and sensation intact throughout.   Skin: No gross skin abnormalities or rashes        LABS:                        10.6   12.69 )-----------( 393      ( 2023 05:30 )             32.9         140  |  104  |  13  ----------------------------<  124<H>  4.1   |  28  |  0.65    Ca    9.2      2023 05:30  Phos  3.2       Mg     1.9         TPro  7.6  /  Alb  4.4  /  TBili  0.4  /  DBili  x   /  AST  14  /  ALT  12  /  AlkPhos  78      PT/INR - ( 2023 17:50 )   PT: 13.1 sec;   INR: 1.10          PTT - ( 2023 17:50 )  PTT:32.0 sec  Urinalysis Basic - ( 2023 12:59 )    Color: Yellow / Appearance: Clear / S.020 / pH: x  Gluc: x / Ketone: NEGATIVE  / Bili: Negative / Urobili: 0.2 E.U./dL   Blood: x / Protein: Trace mg/dL / Nitrite: NEGATIVE   Leuk Esterase: Moderate / RBC: < 5 /HPF / WBC 5-10 /HPF   Sq Epi: x / Non Sq Epi: 5-10 /HPF / Bacteria: Present /HPF          RADIOLOGY & ADDITIONAL TESTS:  No new imaging    MEDICATIONS  (STANDING):  acetaminophen     Tablet .. 1000 milliGRAM(s) Oral every 8 hours  bisacodyl 5 milliGRAM(s) Oral every 12 hours  bisacodyl Suppository 10 milliGRAM(s) Rectal once  dexAMETHasone     Tablet 4 milliGRAM(s) Oral every 6 hours  dextrose 5%. 1000 milliLiter(s) (50 mL/Hr) IV Continuous <Continuous>  dextrose 5%. 1000 milliLiter(s) (100 mL/Hr) IV Continuous <Continuous>  dextrose 50% Injectable 25 Gram(s) IV Push once  dextrose 50% Injectable 12.5 Gram(s) IV Push once  dextrose 50% Injectable 25 Gram(s) IV Push once  glucagon  Injectable 1 milliGRAM(s) IntraMuscular once  influenza   Vaccine 0.5 milliLiter(s) IntraMuscular once  insulin lispro (ADMELOG) corrective regimen sliding scale   SubCutaneous three times a day before meals  magnesium oxide 400 milliGRAM(s) Oral once  methocarbamol 500 milliGRAM(s) Oral every 8 hours  ondansetron    Tablet 4 milliGRAM(s) Oral every 6 hours  pantoprazole    Tablet 40 milliGRAM(s) Oral before breakfast  polyethylene glycol 3350 17 Gram(s) Oral daily  pregabalin 50 milliGRAM(s) Oral every 8 hours  senna 2 Tablet(s) Oral at bedtime    MEDICATIONS  (PRN):  dextrose Oral Gel 15 Gram(s) Oral once PRN Blood Glucose LESS THAN 70 milliGRAM(s)/deciliter  oxyCODONE    IR 5 milliGRAM(s) Oral every 4 hours PRN Moderate Pain (4 - 6)  oxyCODONE    IR 10 milliGRAM(s) Oral every 4 hours PRN Severe Pain (7 - 10)

## 2023-01-25 NOTE — PATIENT PROFILE ADULT - FALL HARM RISK - HARM RISK INTERVENTIONS
Assistance with ambulation/Assistance OOB with selected safe patient handling equipment/Communicate Risk of Fall with Harm to all staff/Discuss with provider need for PT consult/Monitor gait and stability/Reinforce activity limits and safety measures with patient and family/Tailored Fall Risk Interventions/Visual Cue: Yellow wristband and red socks/Bed in lowest position, wheels locked, appropriate side rails in place/Call bell, personal items and telephone in reach/Instruct patient to call for assistance before getting out of bed or chair/Non-slip footwear when patient is out of bed/[place] to call system/Physically safe environment - no spills, clutter or unnecessary equipment/Purposeful Proactive Rounding/Room/bathroom lighting operational, light cord in reach

## 2023-01-25 NOTE — DISCHARGE NOTE PROVIDER - NSDCFUADDINST_GEN_ALL_CORE_FT
Neurosurgery follow up appointment date/time:  - are staples/sutures in place?  - what day should staples/sutures be removed (POD 10-14)?  - please call the office to confirm appointment: [phone]    Wound Care:  - you may shower  - pat dry incision after showering  - leave incision uncovered, open to air     Devices/Drains/Lines:  - PICC in place? ID follow up? (Paper Rx for: antibiotics, heparin flush, weekly lab draws)  - JEAN CLAUDE in place? Management?  - cherry in place? Management/Urology follow up?  - Tracheostomy?  - PEG tube?      Activity:  - fatigue is common after surgery, rest if you feel tired   - no bending, lifting, twisting or heavy lifting   - walking is recommended, ambulate as tolerated  - you may shower when you get home, keep your incision dry  - no bathing   - no driving within 24 hours of anesthesia or while taking prescription pain medications   - keep hydrated, drink plenty of water     Inpatient consults:  - final recommendations  - you will need follow up with....    Please also follow up with your primary care doctor.     Pain Expectations:  - pain after surgery is expected  - please take pain meds as prescribed     Medications:  - changes to home meds (ex. AED's)?  - new meds?  - pain meds?  - when can antiplatelets or anticoagulants be restarted?  - were adverse affects of meds discussed with patients?   - pain medications can cause constipation, you should eat a high fiber diet and may take a stool softener while on pain meds   - Avoid taking Advil (ibuprofen), Motrin (naproxen), or Aspirin for pain as they can cause bleeding     Call the office or come to ED if:  - wound has drainage or bleeding, increased redness or pain at incision site, neurological change, fever (>101), chills, night sweats, syncope, nausea/vomiting      Playback:  - see playback health for a copy of your discharge paperwork     WITHIN 24 HOURS OF DISCHARGE, PLEASE CONTACT NEURO PA  WITH ANY QUESTIONS OR CONCERNS: [phone]   OTHERWISE, PLEASE CALL THE OFFICE WITH ANY QUESTIONS OR CONCERNS: [phone] Neurosurgery follow up appointment:  - Your wound will be evaluated at your follow up appointment. Any sutures will be removed at that time.  - please call the office to confirm appointment: [phone]    Wound Care:  - you may shower  - pat dry incision after showering  - leave incision uncovered, open to air   Activity:  - fatigue is common after surgery, rest if you feel tired   - no bending, lifting, twisting or heavy lifting   - walking is recommended, ambulate as tolerated  - you may shower when you get home, keep your incision dry  - no bathing   - no driving within 24 hours of anesthesia or while taking prescription pain medications   - keep hydrated, drink plenty of water     Inpatient consults:  - you can follow up with Dr. Dick if needed for pain management    Please also follow up with your primary care doctor.     Pain Expectations:  - pain after surgery is expected  - please take pain meds as prescribed     Medications:  - you can continue your home medications  - take tylenol as needed for mild pain  - take tramadol as needed for moderate/severe pain  - take robaxin as needed for muscle spasm   - take lyrica for neuropathic pain   - pain medications can cause constipation, you should eat a high fiber diet and may take a stool softener while on pain meds   - Avoid taking Advil (ibuprofen), Motrin (naproxen), or Aspirin for pain as they can cause bleeding     Call the office or come to ED if:  - wound has drainage or bleeding, increased redness or pain at incision site, neurological change, fever (>101), chills, night sweats, syncope, nausea/vomiting      WITHIN 24 HOURS OF DISCHARGE, PLEASE CONTACT NEURO PA  WITH ANY QUESTIONS OR CONCERNS: [phone]   OTHERWISE, PLEASE CALL THE OFFICE WITH ANY QUESTIONS OR CONCERNS: [phone] Neurosurgery follow up appointment:  - Your wound will be evaluated at your follow up appointment. Any sutures will be removed at that time.  - please call the office to confirm appointment: [phone]    Wound Care:  - you may shower  - pat dry incision after showering  - leave incision uncovered, open to air   Activity:  - fatigue is common after surgery, rest if you feel tired   - no bending, lifting, twisting or heavy lifting   - walking is recommended, ambulate as tolerated  - you may shower when you get home, keep your incision dry  - no bathing   - no driving within 24 hours of anesthesia or while taking prescription pain medications   - keep hydrated, drink plenty of water     Inpatient consults:  - you can follow up with Dr. Dick if needed for pain management    Please also follow up with your primary care doctor.     Pain Expectations:  - pain after surgery is expected  - please take pain meds as prescribed     Medications:  - you can continue your home medications  - take tylenol as needed for mild pain  - take tramadol as needed for moderate/severe pain  - take robaxin as needed for muscle spasm   - take lyrica for neuropathic pain. This will taper to off. Take 2 tabs twice a day for 2 days, then 1 tab once a day for 2 days, then stop.  - pain medications can cause constipation, you should eat a high fiber diet and may take a stool softener while on pain meds   - Avoid taking Advil (ibuprofen), Motrin (naproxen), or Aspirin for pain as they can cause bleeding     Call the office or come to ED if:  - wound has drainage or bleeding, increased redness or pain at incision site, neurological change, fever (>101), chills, night sweats, syncope, nausea/vomiting      WITHIN 24 HOURS OF DISCHARGE, PLEASE CONTACT NEURO PA  WITH ANY QUESTIONS OR CONCERNS: [phone]   OTHERWISE, PLEASE CALL THE OFFICE WITH ANY QUESTIONS OR CONCERNS: [phone]

## 2023-01-25 NOTE — PHYSICAL THERAPY INITIAL EVALUATION ADULT - GENERAL OBSERVATIONS, REHAB EVAL
Pt received semi supine, +lumbar incision bandage C/D/I, +hemovac, +JEAN CLAUDE, +heplock, NAD, agreeable to PT.

## 2023-01-25 NOTE — PROGRESS NOTE ADULT - SUBJECTIVE AND OBJECTIVE BOX
HPI:  38 yo female s/p L5-S1 lami/disc 2022, since been followed by pain management doctor. Recently s/p L4-L5 and L5-S1 SABINO on  due to RLE radiculopathy. Pt presented to OSH ED this past weekend due to RLE radiculopathy, CT lumbar spine was performed and read as unremarkable. Pt discharged home with pain medication. Pt presents to [facility] ED today due to persistent RLE radiculopathy contributing to decreased urination. Pt is ambulating and is currently able to control bladder. Denies localized weakness or saddle anesthesia.   (2023 17:47)  : Admitted for severe back pain, recurrent herniated disc, pain control overnight with plan for OR this week  : Straight cath for urinary retention. Enema given in AM. c/o new R posterior thigh and R groin, plan for OR today with Dr. Horner for L5-S1 TLIF.   : POD1 L5-S1 TLIF     OVERNIGHT EVENTS: no acute events overnight   Vital Signs Last 24 Hrs  T(C): 37.1 (2023 00:21), Max: 37.1 (2023 00:21)  T(F): 98.7 (2023 00:21), Max: 98.7 (2023 00:21)  HR: 84 (2023 00:21) (76 - 106)  BP: 109/72 (2023 00:21) (109/72 - 127/82)  BP(mean): 92 (2023 19:54) (90 - 100)  RR: 17 (2023 00:21) (12 - 17)  SpO2: 96% (2023 00:21) (95% - 100%)    Parameters below as of 2023 00:21  Patient On (Oxygen Delivery Method): room air        I&O's Summary    2023 07:01  -  2023 07:00  --------------------------------------------------------  IN: 0 mL / OUT: 1075 mL / NET: -1075 mL    2023 07:01  -  2023 00:45  --------------------------------------------------------  IN: 160 mL / OUT: 1210 mL / NET: -1050 mL          Physical Exam: PHYSICAL EXAM:  General: NAD, comfortably lying in bed, on room air  Cardiovascular: regular rate and rhythm   Respiratory: nonlabored breathing, normal chest rise   GI: abdomen soft, nontender, nondistended  Neuro: AAOx3, following commands, speech clear, no aphasia  CN II-XII grossly intact, PERRL 3mm briskly reactive, EOMI, face symmetric  ACUNA x4 spontaneously antigravity, pain and effort limited due to recent anesthesia but symmetric no pronator drift, no dysmetria  Extremities: distal pulses 2+ x4  Drains: HMV + JEAN CLAUDE    LABS:                        12.5   10.49 )-----------( 418      ( 2023 06:58 )             38.5         137  |  102  |  15  ----------------------------<  126<H>  4.1   |  25  |  0.63    Ca    9.8      2023 06:58  Phos  3.3       Mg     2.2         TPro  7.6  /  Alb  4.4  /  TBili  0.4  /  DBili  x   /  AST  14  /  ALT  12  /  AlkPhos  78      PT/INR - ( 2023 17:50 )   PT: 13.1 sec;   INR: 1.10          PTT - ( 2023 17:50 )  PTT:32.0 sec  Urinalysis Basic - ( 2023 12:59 )    Color: Yellow / Appearance: Clear / S.020 / pH: x  Gluc: x / Ketone: NEGATIVE  / Bili: Negative / Urobili: 0.2 E.U./dL   Blood: x / Protein: Trace mg/dL / Nitrite: NEGATIVE   Leuk Esterase: Moderate / RBC: < 5 /HPF / WBC 5-10 /HPF   Sq Epi: x / Non Sq Epi: 5-10 /HPF / Bacteria: Present /HPF      CAPILLARY BLOOD GLUCOSE      POCT Blood Glucose.: 110 mg/dL (2023 22:01)    Allergies    adhesives (Blisters)  No Known Drug Allergies    Intolerances      MEDICATIONS:  Antibiotics:  ceFAZolin   IVPB 2000 milliGRAM(s) IV Intermittent every 8 hours    Neuro:  acetaminophen     Tablet .. 1000 milliGRAM(s) Oral every 8 hours  methocarbamol 500 milliGRAM(s) Oral every 8 hours  ondansetron    Tablet 4 milliGRAM(s) Oral every 6 hours  oxyCODONE    IR 5 milliGRAM(s) Oral every 4 hours PRN  oxyCODONE    IR 10 milliGRAM(s) Oral every 4 hours PRN  pregabalin 50 milliGRAM(s) Oral every 8 hours    Anticoagulation:    OTHER:  bisacodyl 5 milliGRAM(s) Oral every 12 hours  dexAMETHasone     Tablet 4 milliGRAM(s) Oral every 6 hours  dextrose 50% Injectable 25 Gram(s) IV Push once  dextrose 50% Injectable 12.5 Gram(s) IV Push once  dextrose 50% Injectable 25 Gram(s) IV Push once  dextrose Oral Gel 15 Gram(s) Oral once PRN  glucagon  Injectable 1 milliGRAM(s) IntraMuscular once  insulin lispro (ADMELOG) corrective regimen sliding scale   SubCutaneous three times a day before meals  pantoprazole    Tablet 40 milliGRAM(s) Oral before breakfast  polyethylene glycol 3350 17 Gram(s) Oral daily  senna 2 Tablet(s) Oral at bedtime    IVF:  dextrose 5%. 1000 milliLiter(s) IV Continuous <Continuous>  dextrose 5%. 1000 milliLiter(s) IV Continuous <Continuous>  sodium chloride 0.9%. 1000 milliLiter(s) IV Continuous <Continuous>  sodium chloride 0.9%. 1000 milliLiter(s) IV Continuous <Continuous>    CULTURES:  Culture Results:   Specimen appears CONTAMINATED. Lab suggests repeat clean catch specimen. ( @ 12:59)    ASSESSMENT:  38 yo female s/p L5-S1 lami/disc 2022, presenting with persistent RLE radiculopathy and urinary retention. MRI lumbar spine reveals large herniated disc at L5-S1. Now s/p     PLAN:  NEURO:  - neuro/vitals  - pain control: ERAS  - decadron 4q6 x 6 doses post op   - MRI - large HNP at L5-S1  - Drains: JEAN CLAUDE + HMV    CARDS:  - SBP normotensive    PULM:  - room air, satting well     GI:  - regular diet  - bowel regimen  - pending BM  - protonix while on decadron     RENAL:  - NS until adequate PO  - TOV in AM      ENDO:  - ISS  - hold home Wegovy for weight loss    ID:  - afebrile  - +UA , no abx tx due to low wbc count on UA, asymptomatic  - trend ESR/CRP    HEME:   - SCDs, hold SQL for now    MISC:  - lactation specialist consulted, delivered pump to patient    Dispo:  Regional bed, pending PT/OT    D/w Dr. Horner   No

## 2023-01-25 NOTE — PHYSICAL THERAPY INITIAL EVALUATION ADULT - NSPTDISCHREC_GEN_A_CORE
pending further functional assessment (limited due to symptomatic orthostatic hypotension)/No skilled PT needs

## 2023-01-25 NOTE — DISCHARGE NOTE PROVIDER - NSDCMRMEDTOKEN_GEN_ALL_CORE_FT
acetaminophen 325 mg oral tablet: 2 tab(s) orally every 8 hours  HYDROmorphone 4 mg oral tablet: took once  ketorolac 10 mg oral tablet: 1 tab(s) orally 4 times a day  Medrol Dosepak 4 mg oral tablet:   methocarbamol 500 mg oral tablet: 1 tab(s) orally 2 times a day  Mounjaro 5 mg/0.5 mL subcutaneous solution: 5 milligram(s) subcutaneous once a day  Multiple Vitamins oral tablet: 1 tab(s) orally once a day  pregabalin 50 mg oral capsule: 1 cap(s) orally once a day  Tylenol 325 mg oral tablet: 2 tab(s) orally every 4 hours, As Needed   acetaminophen 500 mg oral tablet: 2 tab(s) orally every 8 hours, As Needed  Mounjaro 5 mg/0.5 mL subcutaneous solution: 5 milligram(s) subcutaneous once a day  Multiple Vitamins oral tablet: 1 tab(s) orally once a day  pregabalin 50 mg oral capsule: 1 cap(s) orally once a day   acetaminophen 500 mg oral tablet: 2 tab(s) orally every 8 hours, As Needed  methocarbamol 500 mg oral tablet: 1 tab(s) orally every 8 hours, As Needed -for muscle spasm MDD:3 tabs M2B  Mounjaro 5 mg/0.5 mL subcutaneous solution: 5 milligram(s) subcutaneous once a day  Multiple Vitamins oral tablet: 1 tab(s) orally once a day  pregabalin 50 mg oral capsule: 1 tab(s) orally twice a day for 2 days, then 1 tab orally once a day for 2 days, then stop MDD:2 tabs    M2B  traMADol 50 mg oral tablet: 1 tab(s) orally every 4 hours, As needed, Severe Pain (7 - 10) MDD:6 tabs     M2B

## 2023-01-25 NOTE — PHYSICAL THERAPY INITIAL EVALUATION ADULT - DIAGNOSIS, PT EVAL
Impaired Joint Mobility, Motor Function, Muscle Performance, and Range of Motion Associated with Bony or Soft Tissue Surgery.
negative...

## 2023-01-25 NOTE — DISCHARGE NOTE PROVIDER - NSDCCPCAREPLAN_GEN_ALL_CORE_FT
PRINCIPAL DISCHARGE DIAGNOSIS  Diagnosis: Lumbar herniated disc  Assessment and Plan of Treatment:       SECONDARY DISCHARGE DIAGNOSES  Diagnosis: Overweight  Assessment and Plan of Treatment:     Diagnosis: Anemia due to acute blood loss  Assessment and Plan of Treatment:     Diagnosis: Lumbar radiculopathy  Assessment and Plan of Treatment:

## 2023-01-25 NOTE — OCCUPATIONAL THERAPY INITIAL EVALUATION ADULT - PERTINENT HX OF CURRENT PROBLEM, REHAB EVAL
36 yo F with hx of obesity, gestational HTN, and s/p L5-S1 lami/disc 06/22 who presented with persistent RLE radiculopathy found to have large herniated disc L5-S1 now s/p L5-S1 TLIF

## 2023-01-26 LAB
ANION GAP SERPL CALC-SCNC: 8 MMOL/L — SIGNIFICANT CHANGE UP (ref 5–17)
BUN SERPL-MCNC: 15 MG/DL — SIGNIFICANT CHANGE UP (ref 7–23)
CALCIUM SERPL-MCNC: 9 MG/DL — SIGNIFICANT CHANGE UP (ref 8.4–10.5)
CHLORIDE SERPL-SCNC: 104 MMOL/L — SIGNIFICANT CHANGE UP (ref 96–108)
CO2 SERPL-SCNC: 26 MMOL/L — SIGNIFICANT CHANGE UP (ref 22–31)
CREAT SERPL-MCNC: 0.6 MG/DL — SIGNIFICANT CHANGE UP (ref 0.5–1.3)
EGFR: 118 ML/MIN/1.73M2 — SIGNIFICANT CHANGE UP
GLUCOSE SERPL-MCNC: 149 MG/DL — HIGH (ref 70–99)
HCT VFR BLD CALC: 32.4 % — LOW (ref 34.5–45)
HGB BLD-MCNC: 10.3 G/DL — LOW (ref 11.5–15.5)
MAGNESIUM SERPL-MCNC: 2.1 MG/DL — SIGNIFICANT CHANGE UP (ref 1.6–2.6)
MCHC RBC-ENTMCNC: 27.7 PG — SIGNIFICANT CHANGE UP (ref 27–34)
MCHC RBC-ENTMCNC: 31.8 GM/DL — LOW (ref 32–36)
MCV RBC AUTO: 87.1 FL — SIGNIFICANT CHANGE UP (ref 80–100)
NRBC # BLD: 0 /100 WBCS — SIGNIFICANT CHANGE UP (ref 0–0)
PHOSPHATE SERPL-MCNC: 2.5 MG/DL — SIGNIFICANT CHANGE UP (ref 2.5–4.5)
PLATELET # BLD AUTO: 341 K/UL — SIGNIFICANT CHANGE UP (ref 150–400)
POTASSIUM SERPL-MCNC: 4.2 MMOL/L — SIGNIFICANT CHANGE UP (ref 3.5–5.3)
POTASSIUM SERPL-SCNC: 4.2 MMOL/L — SIGNIFICANT CHANGE UP (ref 3.5–5.3)
RBC # BLD: 3.72 M/UL — LOW (ref 3.8–5.2)
RBC # FLD: 13.2 % — SIGNIFICANT CHANGE UP (ref 10.3–14.5)
SODIUM SERPL-SCNC: 138 MMOL/L — SIGNIFICANT CHANGE UP (ref 135–145)
WBC # BLD: 10.67 K/UL — HIGH (ref 3.8–10.5)
WBC # FLD AUTO: 10.67 K/UL — HIGH (ref 3.8–10.5)

## 2023-01-26 PROCEDURE — 99232 SBSQ HOSP IP/OBS MODERATE 35: CPT

## 2023-01-26 PROCEDURE — 72100 X-RAY EXAM L-S SPINE 2/3 VWS: CPT | Mod: 26

## 2023-01-26 RX ORDER — SODIUM,POTASSIUM PHOSPHATES 278-250MG
1 POWDER IN PACKET (EA) ORAL EVERY 6 HOURS
Refills: 0 | Status: COMPLETED | OUTPATIENT
Start: 2023-01-26 | End: 2023-01-26

## 2023-01-26 RX ORDER — OXYCODONE HYDROCHLORIDE 5 MG/1
5 TABLET ORAL EVERY 4 HOURS
Refills: 0 | Status: DISCONTINUED | OUTPATIENT
Start: 2023-01-26 | End: 2023-01-26

## 2023-01-26 RX ORDER — TRAMADOL HYDROCHLORIDE 50 MG/1
50 TABLET ORAL EVERY 4 HOURS
Refills: 0 | Status: DISCONTINUED | OUTPATIENT
Start: 2023-01-26 | End: 2023-01-27

## 2023-01-26 RX ORDER — METHOCARBAMOL 500 MG/1
500 TABLET, FILM COATED ORAL EVERY 8 HOURS
Refills: 0 | Status: DISCONTINUED | OUTPATIENT
Start: 2023-01-26 | End: 2023-01-27

## 2023-01-26 RX ADMIN — Medication 1000 MILLIGRAM(S): at 06:34

## 2023-01-26 RX ADMIN — Medication 1 PACKET(S): at 10:46

## 2023-01-26 RX ADMIN — METHOCARBAMOL 500 MILLIGRAM(S): 500 TABLET, FILM COATED ORAL at 06:33

## 2023-01-26 RX ADMIN — SENNA PLUS 2 TABLET(S): 8.6 TABLET ORAL at 21:03

## 2023-01-26 RX ADMIN — Medication 50 MILLIGRAM(S): at 06:33

## 2023-01-26 RX ADMIN — POLYETHYLENE GLYCOL 3350 17 GRAM(S): 17 POWDER, FOR SOLUTION ORAL at 17:15

## 2023-01-26 RX ADMIN — Medication 1000 MILLIGRAM(S): at 21:03

## 2023-01-26 RX ADMIN — ENOXAPARIN SODIUM 40 MILLIGRAM(S): 100 INJECTION SUBCUTANEOUS at 21:04

## 2023-01-26 RX ADMIN — Medication 1000 MILLIGRAM(S): at 22:03

## 2023-01-26 RX ADMIN — OXYCODONE HYDROCHLORIDE 10 MILLIGRAM(S): 5 TABLET ORAL at 05:55

## 2023-01-26 RX ADMIN — Medication 5 MILLIGRAM(S): at 06:34

## 2023-01-26 RX ADMIN — Medication 1000 MILLIGRAM(S): at 07:30

## 2023-01-26 RX ADMIN — OXYCODONE HYDROCHLORIDE 10 MILLIGRAM(S): 5 TABLET ORAL at 04:52

## 2023-01-26 RX ADMIN — Medication 1 PACKET(S): at 17:00

## 2023-01-26 RX ADMIN — Medication 4 MILLIGRAM(S): at 06:33

## 2023-01-26 RX ADMIN — PANTOPRAZOLE SODIUM 40 MILLIGRAM(S): 20 TABLET, DELAYED RELEASE ORAL at 06:34

## 2023-01-26 RX ADMIN — Medication 1000 MILLIGRAM(S): at 14:45

## 2023-01-26 RX ADMIN — METHOCARBAMOL 500 MILLIGRAM(S): 500 TABLET, FILM COATED ORAL at 21:03

## 2023-01-26 RX ADMIN — Medication 50 MILLIGRAM(S): at 17:15

## 2023-01-26 RX ADMIN — Medication 1 ENEMA: at 10:47

## 2023-01-26 RX ADMIN — Medication 1000 MILLIGRAM(S): at 13:45

## 2023-01-26 NOTE — PROGRESS NOTE ADULT - PROBLEM SELECTOR PLAN 5
Previously on Mounjaro at home switched to Wegovy, has had good weight loss  - hold inpatient and can resume as outpatient
Previously on Mounjaro at home switched to Wegovy, has had good weight loss  - hold inpatient and can resume as outpatient

## 2023-01-26 NOTE — PROGRESS NOTE ADULT - SUBJECTIVE AND OBJECTIVE BOX
Patient is a 37y old  Female who presents with a chief complaint of L5-S1 HNP (26 Jan 2023 00:03)      SUBJECTIVE:  Patient seen and examined at bedside.  Pain well controlled.  Lightheadedness from yesterday improved.  Has not had a BM yet but does not feel the urge.  Urinating normally on her own.  Low appetite but eating    ROS:  Denies fevers, chills, headache, vision changes, neck pain, cough, SOB, chest pain, Abdominal pain, N/V, dysuria or new rash.  All other ROS negative except as above    Vital Signs Last 12 Hrs  T(F): 97.7 (01-26-23 @ 08:20), Max: 97.7 (01-26-23 @ 08:20)  HR: 70 (01-26-23 @ 08:20) (70 - 82)  BP: 120/84 (01-26-23 @ 08:20) (104/70 - 120/84)  BP(mean): --  RR: 17 (01-26-23 @ 08:20) (17 - 17)  SpO2: 96% (01-26-23 @ 08:20) (95% - 96%)  I&O's Summary    25 Jan 2023 07:01  -  26 Jan 2023 07:00  --------------------------------------------------------  IN: 300 mL / OUT: 3223 mL / NET: -2923 mL    26 Jan 2023 07:01  -  26 Jan 2023 11:13  --------------------------------------------------------  IN: 0 mL / OUT: 825 mL / NET: -825 mL        PHYSICAL EXAM:  Constitutional: NAD, comfortable in bed.  HEENT: PERRLA, MMM  Neck: Supple  Respiratory: CTA B/L. No w/r/r.   Cardiovascular: RRR, normal S1 and S2, no m/r/g.   Gastrointestinal: +BS, soft NTND, no guarding or rebound tenderness, no palpable masses   Extremities: wwp; no cyanosis, clubbing or edema.   Back: drains in place  Vascular: Pulses equal and strong throughout.   Neurological: AAOx3, no CN deficits, strength and sensation intact throughout.   Skin: No gross skin abnormalities or rashes        LABS:                        10.3   10.67 )-----------( 341      ( 26 Jan 2023 05:30 )             32.4     01-26    138  |  104  |  15  ----------------------------<  149<H>  4.2   |  26  |  0.60    Ca    9.0      26 Jan 2023 05:30  Phos  2.5     01-26  Mg     2.1     01-26              RADIOLOGY & ADDITIONAL TESTS:  < from: Xray Lumbar Spine AP + Lateral (01.26.23 @ 09:10) >  Impression:  Status post fusion L5-S1.    < end of copied text >      MEDICATIONS  (STANDING):  acetaminophen     Tablet .. 1000 milliGRAM(s) Oral every 8 hours  enoxaparin Injectable 40 milliGRAM(s) SubCutaneous <User Schedule>  influenza   Vaccine 0.5 milliLiter(s) IntraMuscular once  methocarbamol 500 milliGRAM(s) Oral every 8 hours  ondansetron    Tablet 4 milliGRAM(s) Oral every 6 hours  pantoprazole    Tablet 40 milliGRAM(s) Oral before breakfast  polyethylene glycol 3350 17 Gram(s) Oral daily  potassium phosphate / sodium phosphate Powder (PHOS-NaK) 1 Packet(s) Oral every 6 hours  pregabalin 50 milliGRAM(s) Oral every 12 hours  senna 2 Tablet(s) Oral at bedtime    MEDICATIONS  (PRN):  bisacodyl 5 milliGRAM(s) Oral every 12 hours PRN Constipation  oxyCODONE    IR 5 milliGRAM(s) Oral every 4 hours PRN Severe Pain (7 - 10)

## 2023-01-26 NOTE — PROGRESS NOTE ADULT - SUBJECTIVE AND OBJECTIVE BOX
SUBJECTIVE: Patient states she feels better today. Reports being able to get up to use the restroom and feeling her bladder fully empty. Also reports numbness on dorsal right foot but much improved since prior to surgery. Denies new pain, weakness, numbness, nausea, vomiting.     HOSPITAL COURSE:   1/23: Admitted for severe back pain, recurrent herniated disc, pain control overnight with plan for OR this week  1/24: Straight cath for urinary retention. Enema given in AM. c/o new R posterior thigh and R groin, plan for OR today with Dr. Horner for L5-S1 TLIF.   1/25: POD1 L5-S1 TLIF, VERITO, SQL started. Orthostatic with PT today. Given 1L bolus NS. Vickers removed in AM and required straight cath. Voided in afternoon.   1/26: POD 2 L5-S1 TLIF, VERITO o/n.     Vital Signs Last 24 Hrs  T(C): 37.1 (25 Jan 2023 21:07), Max: 37.2 (25 Jan 2023 15:20)  T(F): 98.8 (25 Jan 2023 21:07), Max: 98.9 (25 Jan 2023 15:20)  HR: 79 (25 Jan 2023 21:07) (68 - 87)  BP: 117/76 (25 Jan 2023 21:07) (54/33 - 134/79)  BP(mean): 90 (25 Jan 2023 15:20) (90 - 90)  RR: 16 (25 Jan 2023 21:07) (16 - 17)  SpO2: 97% (25 Jan 2023 21:07) (96% - 98%)    Parameters below as of 25 Jan 2023 21:07  Patient On (Oxygen Delivery Method): room air    I&O's Summary    24 Jan 2023 07:01  -  25 Jan 2023 07:00  --------------------------------------------------------  IN: 160 mL / OUT: 2525 mL / NET: -2365 mL    25 Jan 2023 07:01  -  26 Jan 2023 00:04  --------------------------------------------------------  IN: 300 mL / OUT: 2258 mL / NET: -1958 mL    PHYSICAL EXAM:  General: NAD, comfortably lying in bed, on room air  Cardiovascular: regular rate and rhythm   Respiratory: nonlabored breathing, normal chest rise   GI: abdomen soft, nontender, nondistended  Neuro: AAOx3, following commands, speech clear, no aphasia. CN II-XII grossly intact, PERRL 3 mm briskly reactive, EOMI, face symmetric, ACUNA x 4 UE b/l 5/5, no pronator drift, no dysmetria. LE pain limited b/l 4/5.   Extremities: distal pulses 2+ x4  Incision: Posterior lumbar incision c/d/i with gauze/tegaderm, +HMV +JEAN CLAUDE    LABS:                        10.6   12.69 )-----------( 393      ( 25 Jan 2023 05:30 )             32.9     01-25    140  |  104  |  13  ----------------------------<  124<H>  4.1   |  28  |  0.65    Ca    9.2      25 Jan 2023 05:30  Phos  3.2     01-25  Mg     1.9     01-25    CAPILLARY BLOOD GLUCOSE    POCT Blood Glucose.: 154 mg/dL (25 Jan 2023 17:34)  POCT Blood Glucose.: 155 mg/dL (25 Jan 2023 11:45)  POCT Blood Glucose.: 128 mg/dL (25 Jan 2023 07:00)    Drug Levels: [] N/A    CSF Analysis: [] N/A    Allergies    adhesives (Blisters)  No Known Drug Allergies    Intolerances    MEDICATIONS:  Antibiotics:    Neuro:  acetaminophen     Tablet .. 1000 milliGRAM(s) Oral every 8 hours  methocarbamol 500 milliGRAM(s) Oral every 8 hours  ondansetron    Tablet 4 milliGRAM(s) Oral every 6 hours  oxyCODONE    IR 5 milliGRAM(s) Oral every 4 hours PRN  oxyCODONE    IR 10 milliGRAM(s) Oral every 4 hours PRN  pregabalin 50 milliGRAM(s) Oral every 8 hours    Anticoagulation:  enoxaparin Injectable 40 milliGRAM(s) SubCutaneous <User Schedule>    OTHER:  bisacodyl 5 milliGRAM(s) Oral every 12 hours  dexAMETHasone     Tablet 4 milliGRAM(s) Oral every 6 hours  influenza   Vaccine 0.5 milliLiter(s) IntraMuscular once  pantoprazole    Tablet 40 milliGRAM(s) Oral before breakfast  polyethylene glycol 3350 17 Gram(s) Oral daily  senna 2 Tablet(s) Oral at bedtime    IVF:    CULTURES:  Culture Results:   Specimen appears CONTAMINATED. Lab suggests repeat clean catch specimen. (01-23 @ 12:59)    RADIOLOGY & ADDITIONAL TESTS:      ASSESSMENT:  38 yo female s/p L5-S1 lami/disc 06/2022, presenting with persistent RLE radiculopathy and urinary retention. MRI lumbar spine reveals large herniated disc at L5-S1. Now s/p L5-S1 TLIF (1/24).    PLAN:  NEURO:  - neuro/vitals  - pain control: ERAS  - decadron 4q6 x 6 doses post op   - MRI 1/23- large HNP at L5-S1  - Drains: JEAN CLAUDE + HMV  - Standing xrays pend pre-discharge     CARDS:  - SBP normotensive    PULM:  - room air, satting well     GI:  - regular diet  - bowel regimen  - pending BM  - protonix while on decadron     RENAL:  - IVL  - SC prn, f/u PVRs    ENDO:  - ISS dc'd  - hold home Wegovy for weight loss    ID:  - afebrile  - +UA 1/23, no abx tx due to low wbc count on UA, asymptomatic  - trend ESR/CRP    HEME:   - SCDs, SQL for DVT prophylaxis    MISC:  - lactation specialist consulted, delivered pump to patient    Dispo:  - Regional bed  - Orthostatic with PT/OT, pending repeat     D/w Dr. Horner

## 2023-01-26 NOTE — PROGRESS NOTE ADULT - SUBJECTIVE AND OBJECTIVE BOX
NEUROSURGERY PAIN MANAGEMENT CONSULT NOTE    Chief Complaint: LBP radiating to RLE    HPI:  36 yo female s/p L5-S1 lami/disc 06/2022, since been followed by pain management doctor. Recently s/p L4-L5 and L5-S1 SABINO on 01/14 due to RLE radiculopathy. Pt presented to OSH ED this past weekend due to RLE radiculopathy, CT lumbar spine was performed and read as unremarkable. Pt discharged home with pain medication. Pt presents to [facility] ED today due to persistent RLE radiculopathy contributing to decreased urination. Pt is ambulating and is currently able to control bladder. Denies localized weakness or saddle anesthesia.   (23 Jan 2023 17:47)      PAST MEDICAL & SURGICAL HISTORY:  Lumbar herniated disc          FAMILY HISTORY:      SOCIAL HISTORY:  [ ] Denies Smoking, Alcohol, or Drug Use    HOME MEDICATIONS:   Please refer to initial HNP    PAIN HOME MEDICATIONS:    Allergies    adhesives (Blisters)  No Known Drug Allergies    Intolerances        PAIN MEDICATIONS:  acetaminophen     Tablet .. 1000 milliGRAM(s) Oral every 8 hours  methocarbamol 500 milliGRAM(s) Oral every 8 hours  ondansetron    Tablet 4 milliGRAM(s) Oral every 6 hours  oxyCODONE    IR 5 milliGRAM(s) Oral every 4 hours PRN  pregabalin 50 milliGRAM(s) Oral every 12 hours    Heme:  enoxaparin Injectable 40 milliGRAM(s) SubCutaneous <User Schedule>    Antibiotics:    Cardiovascular:    GI:  bisacodyl 5 milliGRAM(s) Oral every 12 hours PRN  pantoprazole    Tablet 40 milliGRAM(s) Oral before breakfast  polyethylene glycol 3350 17 Gram(s) Oral daily  senna 2 Tablet(s) Oral at bedtime    Endocrine:    All Other Medications:  influenza   Vaccine 0.5 milliLiter(s) IntraMuscular once  potassium phosphate / sodium phosphate Powder (PHOS-NaK) 1 Packet(s) Oral every 6 hours      Vital Signs Last 24 Hrs  T(C): 36.5 (26 Jan 2023 08:20), Max: 37.2 (25 Jan 2023 15:20)  T(F): 97.7 (26 Jan 2023 08:20), Max: 98.9 (25 Jan 2023 15:20)  HR: 70 (26 Jan 2023 08:20) (68 - 87)  BP: 120/84 (26 Jan 2023 08:20) (54/33 - 134/79)  BP(mean): 90 (25 Jan 2023 15:20) (90 - 90)  RR: 17 (26 Jan 2023 08:20) (16 - 17)  SpO2: 96% (26 Jan 2023 08:20) (95% - 97%)    Parameters below as of 26 Jan 2023 08:20  Patient On (Oxygen Delivery Method): room air        LABS:                        10.3   10.67 )-----------( 341      ( 26 Jan 2023 05:30 )             32.4     01-26    138  |  104  |  15  ----------------------------<  149<H>  4.2   |  26  |  0.60    Ca    9.0      26 Jan 2023 05:30  Phos  2.5     01-26  Mg     2.1     01-26            RADIOLOGY:    Drug Screen:        REVIEW OF SYSTEMS:  CONSTITUTIONAL: No fever or fatigue O/N.   EYES: No eye pain, visual disturbances  ENMT:  No difficulty hearing. No throat pain  NECK: No pain or stiffness  RESPIRATORY: No cough, wheezing; No shortness of breath  CARDIOVASCULAR: No chest pain, palpitations.   GASTROINTESTINAL: Pt reports passing gas. No bowel movements. No abdominal or epigastric pain. No nausea, vomiting. GENITOURINARY: No dysuria, frequency, or incontinence  NEUROLOGICAL: No headaches, loss of strength, numbness, or tremors. No dizzinesss or lightheadedness with pain medications.   MUSCULOSKELETAL: Incisional back pain. No joint pain or swelling; No muscle, or extremity pain    PAIN ASSESSMENT: pain well controlled    PHYSICAL EXAM  GENERAL: Laying in bed, NAD  Neuro: CN II-XII PERRL, EOMI  Cranial nerves grossly intact  Motor exam: MAEx4   Sensation intact to LT in UE/LE in 3 dermatomes  CHEST/LUNG: Clear to auscultation bilaterally; No rales, rhonchi, wheezing, or rubs  HEART: Regular rate and rhythm; No murmurs, rubs, or gallops  ABDOMEN: Soft, Nontender, Nondistended; Bowel sounds present  EXTREMITIES:  2+ Peripheral Pulses, No clubbing, cyanosis, or edema  SKIN: No rashes or lesions      ASSESSMENT:   36 yo female s/p L5-S1 lami/disc 06/2022, presenting with persistent RLE radiculopathy. MRI lumbar spine reveals large herniated disc at L5-S1.     PLAN:   - Pain:  Tylenol 1000mg every 8 hours  Decrease Lyrica to 50mg every 12 hours  Robaxin 500mg every 8 hours  Oxycodone 5mg every 4 hours as needed for severe pain     - Bowel regimen: Senna    - Nausea ppx: Zofran standing  - Functional Goals: Pt will get OOB with PT today. Pt will resume previous level of activity without impairment from surgery.   - Additional Consults: None recommended.   - Additional Labs/Imaging:  None recommended.   - Follow up, Discharge Planning: pending PT/OT  - Pain Management follow up plan: will continue to follow    d/w Dr. Dick    NEUROSURGERY PAIN MANAGEMENT CONSULT NOTE    Chief Complaint: LBP radiating to RLE    HPI:  38 yo female s/p L5-S1 lami/disc 06/2022, since been followed by pain management doctor. Recently s/p L4-L5 and L5-S1 SABINO on 01/14 due to RLE radiculopathy. Pt presented to OSH ED this past weekend due to RLE radiculopathy, CT lumbar spine was performed and read as unremarkable. Pt discharged home with pain medication. Pt presents to [facility] ED today due to persistent RLE radiculopathy contributing to decreased urination. Pt is ambulating and is currently able to control bladder. Denies localized weakness or saddle anesthesia.   (23 Jan 2023 17:47)      PAST MEDICAL & SURGICAL HISTORY:  Lumbar herniated disc          FAMILY HISTORY:      SOCIAL HISTORY:  [ ] Denies Smoking, Alcohol, or Drug Use    HOME MEDICATIONS:   Please refer to initial HNP    PAIN HOME MEDICATIONS:    Allergies    adhesives (Blisters)  No Known Drug Allergies    Intolerances        PAIN MEDICATIONS:  acetaminophen     Tablet .. 1000 milliGRAM(s) Oral every 8 hours  methocarbamol 500 milliGRAM(s) Oral every 8 hours  ondansetron    Tablet 4 milliGRAM(s) Oral every 6 hours  oxyCODONE    IR 5 milliGRAM(s) Oral every 4 hours PRN  pregabalin 50 milliGRAM(s) Oral every 12 hours    Heme:  enoxaparin Injectable 40 milliGRAM(s) SubCutaneous <User Schedule>    Antibiotics:    Cardiovascular:    GI:  bisacodyl 5 milliGRAM(s) Oral every 12 hours PRN  pantoprazole    Tablet 40 milliGRAM(s) Oral before breakfast  polyethylene glycol 3350 17 Gram(s) Oral daily  senna 2 Tablet(s) Oral at bedtime    Endocrine:    All Other Medications:  influenza   Vaccine 0.5 milliLiter(s) IntraMuscular once  potassium phosphate / sodium phosphate Powder (PHOS-NaK) 1 Packet(s) Oral every 6 hours      Vital Signs Last 24 Hrs  T(C): 36.5 (26 Jan 2023 08:20), Max: 37.2 (25 Jan 2023 15:20)  T(F): 97.7 (26 Jan 2023 08:20), Max: 98.9 (25 Jan 2023 15:20)  HR: 70 (26 Jan 2023 08:20) (68 - 87)  BP: 120/84 (26 Jan 2023 08:20) (54/33 - 134/79)  BP(mean): 90 (25 Jan 2023 15:20) (90 - 90)  RR: 17 (26 Jan 2023 08:20) (16 - 17)  SpO2: 96% (26 Jan 2023 08:20) (95% - 97%)    Parameters below as of 26 Jan 2023 08:20  Patient On (Oxygen Delivery Method): room air        LABS:                        10.3   10.67 )-----------( 341      ( 26 Jan 2023 05:30 )             32.4     01-26    138  |  104  |  15  ----------------------------<  149<H>  4.2   |  26  |  0.60    Ca    9.0      26 Jan 2023 05:30  Phos  2.5     01-26  Mg     2.1     01-26            RADIOLOGY:    Drug Screen:        REVIEW OF SYSTEMS:  CONSTITUTIONAL: No fever or fatigue O/N.   EYES: No eye pain, visual disturbances  ENMT:  No difficulty hearing. No throat pain  NECK: No pain or stiffness  RESPIRATORY: No cough, wheezing; No shortness of breath  CARDIOVASCULAR: No chest pain, palpitations.   GASTROINTESTINAL: Pt reports passing gas. No bowel movements. No abdominal or epigastric pain. No nausea, vomiting. GENITOURINARY: No dysuria, frequency, or incontinence  NEUROLOGICAL: No headaches, loss of strength, numbness, or tremors. No dizzinesss or lightheadedness with pain medications.   MUSCULOSKELETAL: Incisional back pain. No joint pain or swelling; No muscle, or extremity pain    PAIN ASSESSMENT: pain well controlled    PHYSICAL EXAM  GENERAL: Laying in bed, NAD  Neuro: CN II-XII PERRL, EOMI  Cranial nerves grossly intact  Motor exam: MAEx4   Sensation intact to LT in UE/LE in 3 dermatomes  CHEST/LUNG: Clear to auscultation bilaterally; No rales, rhonchi, wheezing, or rubs  HEART: Regular rate and rhythm; No murmurs, rubs, or gallops  ABDOMEN: Soft, Nontender, Nondistended; Bowel sounds present  EXTREMITIES:  2+ Peripheral Pulses, No clubbing, cyanosis, or edema  SKIN: No rashes or lesions      ASSESSMENT:   38 yo female s/p L5-S1 lami/disc 06/2022, presenting with persistent RLE radiculopathy. MRI lumbar spine reveals large herniated disc at L5-S1. Now s/p L5-S1 TLIF (1/24).    PLAN:   - Pain:  Tylenol 1000mg every 8 hours  Decrease Lyrica to 50mg every 12 hours  Robaxin 500mg every 8 hours  Oxycodone 5mg every 4 hours as needed for severe pain     - Bowel regimen: Senna    - Nausea ppx: Zofran standing  - Functional Goals: Pt will get OOB with PT today. Pt will resume previous level of activity without impairment from surgery.   - Additional Consults: None recommended.   - Additional Labs/Imaging:  None recommended.   - Follow up, Discharge Planning: pending PT/OT  - Pain Management follow up plan: will continue to follow    d/w Dr. Dick    NEUROSURGERY PAIN MANAGEMENT CONSULT NOTE    Chief Complaint: LBP radiating to RLE    HPI:  36 yo female s/p L5-S1 lami/disc 06/2022, since been followed by pain management doctor. Recently s/p L4-L5 and L5-S1 SABINO on 01/14 due to RLE radiculopathy. Pt presented to OSH ED this past weekend due to RLE radiculopathy, CT lumbar spine was performed and read as unremarkable. Pt discharged home with pain medication. Pt presents to [facility] ED today due to persistent RLE radiculopathy contributing to decreased urination. Pt is ambulating and is currently able to control bladder. Denies localized weakness or saddle anesthesia.   (23 Jan 2023 17:47)      PAST MEDICAL & SURGICAL HISTORY:  Lumbar herniated disc          FAMILY HISTORY:      SOCIAL HISTORY:  [ ] Denies Smoking, Alcohol, or Drug Use    HOME MEDICATIONS:   Please refer to initial HNP    PAIN HOME MEDICATIONS:    Allergies    adhesives (Blisters)  No Known Drug Allergies    Intolerances        PAIN MEDICATIONS:  acetaminophen     Tablet .. 1000 milliGRAM(s) Oral every 8 hours  methocarbamol 500 milliGRAM(s) Oral every 8 hours  ondansetron    Tablet 4 milliGRAM(s) Oral every 6 hours  oxyCODONE    IR 5 milliGRAM(s) Oral every 4 hours PRN  pregabalin 50 milliGRAM(s) Oral every 12 hours    Heme:  enoxaparin Injectable 40 milliGRAM(s) SubCutaneous <User Schedule>    Antibiotics:    Cardiovascular:    GI:  bisacodyl 5 milliGRAM(s) Oral every 12 hours PRN  pantoprazole    Tablet 40 milliGRAM(s) Oral before breakfast  polyethylene glycol 3350 17 Gram(s) Oral daily  senna 2 Tablet(s) Oral at bedtime    Endocrine:    All Other Medications:  influenza   Vaccine 0.5 milliLiter(s) IntraMuscular once  potassium phosphate / sodium phosphate Powder (PHOS-NaK) 1 Packet(s) Oral every 6 hours      Vital Signs Last 24 Hrs  T(C): 36.5 (26 Jan 2023 08:20), Max: 37.2 (25 Jan 2023 15:20)  T(F): 97.7 (26 Jan 2023 08:20), Max: 98.9 (25 Jan 2023 15:20)  HR: 70 (26 Jan 2023 08:20) (68 - 87)  BP: 120/84 (26 Jan 2023 08:20) (54/33 - 134/79)  BP(mean): 90 (25 Jan 2023 15:20) (90 - 90)  RR: 17 (26 Jan 2023 08:20) (16 - 17)  SpO2: 96% (26 Jan 2023 08:20) (95% - 97%)    Parameters below as of 26 Jan 2023 08:20  Patient On (Oxygen Delivery Method): room air        LABS:                        10.3   10.67 )-----------( 341      ( 26 Jan 2023 05:30 )             32.4     01-26    138  |  104  |  15  ----------------------------<  149<H>  4.2   |  26  |  0.60    Ca    9.0      26 Jan 2023 05:30  Phos  2.5     01-26  Mg     2.1     01-26            RADIOLOGY:    Drug Screen:        REVIEW OF SYSTEMS:  CONSTITUTIONAL: No fever or fatigue O/N.   EYES: No eye pain, visual disturbances  ENMT:  No difficulty hearing. No throat pain  NECK: No pain or stiffness  RESPIRATORY: No cough, wheezing; No shortness of breath  CARDIOVASCULAR: No chest pain, palpitations.   GASTROINTESTINAL: Pt reports passing gas. No bowel movements. No abdominal or epigastric pain. No nausea, vomiting. GENITOURINARY: No dysuria, frequency, or incontinence  NEUROLOGICAL: No headaches, loss of strength, numbness, or tremors. No dizzinesss or lightheadedness with pain medications.   MUSCULOSKELETAL: Incisional back pain. No joint pain or swelling; No muscle, or extremity pain    PAIN ASSESSMENT: pain well controlled    PHYSICAL EXAM  GENERAL: Laying in bed, NAD  Neuro: CN II-XII PERRL, EOMI  Cranial nerves grossly intact  Motor exam: MAEx4   Sensation intact to LT in UE/LE in 3 dermatomes  CHEST/LUNG: Clear to auscultation bilaterally; No rales, rhonchi, wheezing, or rubs  HEART: Regular rate and rhythm; No murmurs, rubs, or gallops  ABDOMEN: Soft, Nontender, Nondistended; Bowel sounds present  EXTREMITIES:  2+ Peripheral Pulses, No clubbing, cyanosis, or edema  SKIN: No rashes or lesions      ASSESSMENT:   36 yo female s/p L5-S1 lami/disc 06/2022, presenting with persistent RLE radiculopathy. MRI lumbar spine reveals large herniated disc at L5-S1. Now s/p L5-S1 TLIF (1/24).    PLAN:   - Pain:  Tylenol 1000mg every 8 hours  Decrease Lyrica to 50mg every 12 hours. If pain continues to be well controlled can continue to wean off while inpatient.   Robaxin 500mg every 8 hours  Oxycodone 5mg every 4 hours as needed for severe pain     - Bowel regimen: Senna    - Nausea ppx: Zofran standing  - Functional Goals: Pt will get OOB with PT today. Pt will resume previous level of activity without impairment from surgery.   - Additional Consults: None recommended.   - Additional Labs/Imaging:  None recommended.   - Follow up, Discharge Planning: pending PT/OT  - Pain Management follow up plan: will continue to follow    d/w Dr. Dick

## 2023-01-26 NOTE — CHART NOTE - NSCHARTNOTEFT_GEN_A_CORE
POD 2 L5-S1 TLIF, VERITO o/n. neuro/hemodynamically stable. improving R S1 dorsal foot numbness, Lyrica decreased to 50mg BID, d/c oxycodone 10mg, patient c/o of no BM x 1 week, received enema this morning and had BM, dressing removed this morning incision clean/dry/intact. standing xrays complete, Staples in place.

## 2023-01-26 NOTE — PROGRESS NOTE ADULT - PROBLEM SELECTOR PLAN 2
WBC 12 today, likely reactive post op as well as in setting of steroids  - afebrile  - no infectious symptoms
WBC 10.6 from 12 today, likely reactive post op as well as in setting of steroids  - afebrile  - no infectious symptoms

## 2023-01-26 NOTE — PROGRESS NOTE ADULT - TIME BILLING
Review of hospital course, labs, vitals, medical records.   Bedside exam and interview    Discussed plan of care with primary team   Documenting the encounter
Review of hospital course, labs, vitals, medical records.   Bedside exam and interview    Discussed plan of care with primary team   Documenting the encounter

## 2023-01-26 NOTE — PROGRESS NOTE ADULT - PROBLEM SELECTOR PLAN 6
UA with leuk esterase and bacteria but < 10 WBC.  No symptoms  - would not treat
UA with leuk esterase and bacteria but < 10 WBC.  No symptoms  - would not treat

## 2023-01-26 NOTE — PROGRESS NOTE ADULT - PROBLEM SELECTOR PLAN 3
Hgb 10 from baseline 12, likely component of surgical blood loss  - no signs of active bleeding  - trend CBC  - transfuse > 7
Hgb 10 from baseline 12, likely component of surgical blood loss  - no signs of active bleeding  - trend CBC  - transfuse > 7

## 2023-01-26 NOTE — PROGRESS NOTE ADULT - PROBLEM SELECTOR PLAN 1
s/p L5-S1 TLIF  - pain control per primary team  - decadron taper  - drain management per primary  - post op XRs performed  - f/u PT re-eval today
s/p L5-S1 TLIF  - pain control per primary team  - decadron taper  - drain management per primary  - f/u PT

## 2023-01-26 NOTE — PROGRESS NOTE ADULT - PROBLEM SELECTOR PLAN 7
DVT ppx: SCDs, Lovenox  Dispo: pending PT recs
DVT ppx: SCDs, Lovenox when ok per primary team  Dispo: pending PT recs

## 2023-01-26 NOTE — PROGRESS NOTE ADULT - ASSESSMENT
38 yo F with hx of obesity, gestational HTN, and s/p L5-S1 lami/disc 06/22 who presented with persistent RLE radiculopathy found to have large herniated disc L5-S1 now s/p
36 yo F with hx of obesity, gestational HTN, and s/p L5-S1 lami/disc 06/22 who presented with persistent RLE radiculopathy found to have large herniated disc L5-S1 now s/p

## 2023-01-27 ENCOUNTER — TRANSCRIPTION ENCOUNTER (OUTPATIENT)
Age: 38
End: 2023-01-27

## 2023-01-27 VITALS
HEART RATE: 71 BPM | DIASTOLIC BLOOD PRESSURE: 84 MMHG | OXYGEN SATURATION: 96 % | TEMPERATURE: 98 F | SYSTOLIC BLOOD PRESSURE: 125 MMHG | RESPIRATION RATE: 17 BRPM

## 2023-01-27 LAB
ANION GAP SERPL CALC-SCNC: 7 MMOL/L — SIGNIFICANT CHANGE UP (ref 5–17)
BUN SERPL-MCNC: 11 MG/DL — SIGNIFICANT CHANGE UP (ref 7–23)
CALCIUM SERPL-MCNC: 9.3 MG/DL — SIGNIFICANT CHANGE UP (ref 8.4–10.5)
CHLORIDE SERPL-SCNC: 102 MMOL/L — SIGNIFICANT CHANGE UP (ref 96–108)
CO2 SERPL-SCNC: 30 MMOL/L — SIGNIFICANT CHANGE UP (ref 22–31)
CREAT SERPL-MCNC: 0.59 MG/DL — SIGNIFICANT CHANGE UP (ref 0.5–1.3)
EGFR: 118 ML/MIN/1.73M2 — SIGNIFICANT CHANGE UP
GLUCOSE SERPL-MCNC: 129 MG/DL — HIGH (ref 70–99)
HCT VFR BLD CALC: 34.4 % — LOW (ref 34.5–45)
HGB BLD-MCNC: 10.7 G/DL — LOW (ref 11.5–15.5)
MAGNESIUM SERPL-MCNC: 1.8 MG/DL — SIGNIFICANT CHANGE UP (ref 1.6–2.6)
MCHC RBC-ENTMCNC: 27.5 PG — SIGNIFICANT CHANGE UP (ref 27–34)
MCHC RBC-ENTMCNC: 31.1 GM/DL — LOW (ref 32–36)
MCV RBC AUTO: 88.4 FL — SIGNIFICANT CHANGE UP (ref 80–100)
NRBC # BLD: 0 /100 WBCS — SIGNIFICANT CHANGE UP (ref 0–0)
PHOSPHATE SERPL-MCNC: 2.7 MG/DL — SIGNIFICANT CHANGE UP (ref 2.5–4.5)
PLATELET # BLD AUTO: 343 K/UL — SIGNIFICANT CHANGE UP (ref 150–400)
POTASSIUM SERPL-MCNC: 3.8 MMOL/L — SIGNIFICANT CHANGE UP (ref 3.5–5.3)
POTASSIUM SERPL-SCNC: 3.8 MMOL/L — SIGNIFICANT CHANGE UP (ref 3.5–5.3)
RBC # BLD: 3.89 M/UL — SIGNIFICANT CHANGE UP (ref 3.8–5.2)
RBC # FLD: 13.3 % — SIGNIFICANT CHANGE UP (ref 10.3–14.5)
SODIUM SERPL-SCNC: 139 MMOL/L — SIGNIFICANT CHANGE UP (ref 135–145)
WBC # BLD: 10.31 K/UL — SIGNIFICANT CHANGE UP (ref 3.8–10.5)
WBC # FLD AUTO: 10.31 K/UL — SIGNIFICANT CHANGE UP (ref 3.8–10.5)

## 2023-01-27 PROCEDURE — 84702 CHORIONIC GONADOTROPIN TEST: CPT

## 2023-01-27 PROCEDURE — 97165 OT EVAL LOW COMPLEX 30 MIN: CPT

## 2023-01-27 PROCEDURE — 85025 COMPLETE CBC W/AUTO DIFF WBC: CPT

## 2023-01-27 PROCEDURE — 85730 THROMBOPLASTIN TIME PARTIAL: CPT

## 2023-01-27 PROCEDURE — 86900 BLOOD TYPING SEROLOGIC ABO: CPT

## 2023-01-27 PROCEDURE — 82962 GLUCOSE BLOOD TEST: CPT

## 2023-01-27 PROCEDURE — 81001 URINALYSIS AUTO W/SCOPE: CPT

## 2023-01-27 PROCEDURE — 86901 BLOOD TYPING SEROLOGIC RH(D): CPT

## 2023-01-27 PROCEDURE — 86850 RBC ANTIBODY SCREEN: CPT

## 2023-01-27 PROCEDURE — 84100 ASSAY OF PHOSPHORUS: CPT

## 2023-01-27 PROCEDURE — 85652 RBC SED RATE AUTOMATED: CPT

## 2023-01-27 PROCEDURE — 99024 POSTOP FOLLOW-UP VISIT: CPT

## 2023-01-27 PROCEDURE — C1889: CPT

## 2023-01-27 PROCEDURE — 80053 COMPREHEN METABOLIC PANEL: CPT

## 2023-01-27 PROCEDURE — 99285 EMERGENCY DEPT VISIT HI MDM: CPT

## 2023-01-27 PROCEDURE — 96374 THER/PROPH/DIAG INJ IV PUSH: CPT

## 2023-01-27 PROCEDURE — 80048 BASIC METABOLIC PNL TOTAL CA: CPT

## 2023-01-27 PROCEDURE — 36415 COLL VENOUS BLD VENIPUNCTURE: CPT

## 2023-01-27 PROCEDURE — 85610 PROTHROMBIN TIME: CPT

## 2023-01-27 PROCEDURE — 97116 GAIT TRAINING THERAPY: CPT

## 2023-01-27 PROCEDURE — 72100 X-RAY EXAM L-S SPINE 2/3 VWS: CPT

## 2023-01-27 PROCEDURE — 76000 FLUOROSCOPY <1 HR PHYS/QHP: CPT

## 2023-01-27 PROCEDURE — 87086 URINE CULTURE/COLONY COUNT: CPT

## 2023-01-27 PROCEDURE — C1713: CPT

## 2023-01-27 PROCEDURE — 97161 PT EVAL LOW COMPLEX 20 MIN: CPT

## 2023-01-27 PROCEDURE — 83036 HEMOGLOBIN GLYCOSYLATED A1C: CPT

## 2023-01-27 PROCEDURE — 85027 COMPLETE CBC AUTOMATED: CPT

## 2023-01-27 PROCEDURE — 96376 TX/PRO/DX INJ SAME DRUG ADON: CPT

## 2023-01-27 PROCEDURE — 96375 TX/PRO/DX INJ NEW DRUG ADDON: CPT

## 2023-01-27 PROCEDURE — 72148 MRI LUMBAR SPINE W/O DYE: CPT | Mod: MA

## 2023-01-27 PROCEDURE — 86140 C-REACTIVE PROTEIN: CPT

## 2023-01-27 PROCEDURE — 87635 SARS-COV-2 COVID-19 AMP PRB: CPT

## 2023-01-27 PROCEDURE — 83735 ASSAY OF MAGNESIUM: CPT

## 2023-01-27 RX ORDER — METHOCARBAMOL 500 MG/1
1 TABLET, FILM COATED ORAL
Qty: 21 | Refills: 0
Start: 2023-01-27 | End: 2023-02-02

## 2023-01-27 RX ORDER — ACETAMINOPHEN 500 MG
2 TABLET ORAL
Qty: 0 | Refills: 0 | DISCHARGE
Start: 2023-01-27

## 2023-01-27 RX ORDER — TRAMADOL HYDROCHLORIDE 50 MG/1
1 TABLET ORAL
Qty: 42 | Refills: 0
Start: 2023-01-27 | End: 2023-02-02

## 2023-01-27 RX ADMIN — Medication 1000 MILLIGRAM(S): at 06:23

## 2023-01-27 RX ADMIN — Medication 1000 MILLIGRAM(S): at 05:23

## 2023-01-27 RX ADMIN — Medication 1000 MILLIGRAM(S): at 13:22

## 2023-01-27 RX ADMIN — Medication 50 MILLIGRAM(S): at 05:23

## 2023-01-27 RX ADMIN — Medication 1000 MILLIGRAM(S): at 14:22

## 2023-01-27 NOTE — PROCEDURE NOTE - GENERAL PROCEDURE DETAILS
JEAN CLAUDE drain taken off self-suction. Site prepped with chlorhexidine. Anchoring suture removed. JEAN CLAUDE drain removed without difficulty. Wound closed with steri-strips.
HMV taken off self-suction. Site prepped with chlorhexidine. Anchoring suture removed. HMV removed without difficulty. Wound closed with steri-strips.

## 2023-01-27 NOTE — DISCHARGE NOTE NURSING/CASE MANAGEMENT/SOCIAL WORK - NSDCFUADDAPPT_GEN_ALL_CORE_FT
You have an appointment with Dr. Horner 2/10/23 at 11am. Please call the office to confirm or if you need to reschedule.    Please follow up with Dr. Dick if needed for pain management    Please follow up with your primary care doctor

## 2023-01-27 NOTE — DISCHARGE NOTE NURSING/CASE MANAGEMENT/SOCIAL WORK - NSDCPEFALRISK_GEN_ALL_CORE
For information on Fall & Injury Prevention, visit: [email or web address] OR  [email or web address] OR  https://www.cdc.gov/steadi/patient.html

## 2023-01-27 NOTE — PROGRESS NOTE ADULT - SUBJECTIVE AND OBJECTIVE BOX
HPI:  38 yo female s/p L5-S1 lami/disc 06/2022, since been followed by pain management doctor. Recently s/p L4-L5 and L5-S1 SABINO on 01/14 due to RLE radiculopathy. Pt presented to OSH ED this past weekend due to RLE radiculopathy, CT lumbar spine was performed and read as unremarkable. Pt discharged home with pain medication. Pt presents to [facility] ED today due to persistent RLE radiculopathy contributing to decreased urination. Pt is ambulating and is currently able to control bladder. Denies localized weakness or saddle anesthesia.   (23 Jan 2023 17:47)    HOSPITAL COURSE:   1/23: Admitted for severe back pain, recurrent herniated disc, pain control overnight with plan for OR this week  1/24: Straight cath for urinary retention. Enema given in AM. c/o new R posterior thigh and R groin, plan for OR today with Dr. Horner for L5-S1 TLIF.   1/25: POD1 L5-S1 TLIF, VERITO, SQL started. Orthostatic with PT today. Given 1L bolus NS. Vickers removed in AM and required straight cath. Voided in afternoon.   1/26: POD 2 L5-S1 TLIF, VERITO o/n. improving R S1 dorsal foot numbness, Lyrica decreased to 50mg BID, d/c oxycodone 10mg, patient c/o of no BM x 1 week, received enema this morning and had BM, dressing removed this morning incision clean/dry/intact. standing xrays complete, Staples in place.   1/27: POD 3. VERITO overnight, pend home no needs when medically ready. F/u HMV and JEAN CLAUDE output. BM yest. Sensation improved.       OVERNIGHT EVENTS:  Vital Signs Last 24 Hrs  T(C): 36.9 (26 Jan 2023 20:25), Max: 36.9 (26 Jan 2023 20:25)  T(F): 98.5 (26 Jan 2023 20:25), Max: 98.5 (26 Jan 2023 20:25)  HR: 76 (26 Jan 2023 20:25) (70 - 82)  BP: 103/68 (26 Jan 2023 20:25) (101/68 - 120/84)  BP(mean): --  RR: 16 (26 Jan 2023 20:25) (16 - 17)  SpO2: 97% (26 Jan 2023 20:25) (95% - 97%)    Parameters below as of 26 Jan 2023 20:25  Patient On (Oxygen Delivery Method): room air        I&O's Summary    25 Jan 2023 07:01  -  26 Jan 2023 07:00  --------------------------------------------------------  IN: 300 mL / OUT: 3223 mL / NET: -2923 mL    26 Jan 2023 07:01  -  27 Jan 2023 02:36  --------------------------------------------------------  IN: 240 mL / OUT: 2130 mL / NET: -1890 mL        PHYSICAL EXAM:  General: NAD, pt is comfortably sitting up in bed, A&O x3, on RA  HEENT: CN II-XII grossly intact, PERRL 3mm, EOMI b/l, face symmetric, tongue midline, neck FROM  Cardiovascular: RRR, normal S1 and S2   Respiratory: lungs CTAB, no wheezing, rhonchi, or crackles   GI: normoactive BS to auscultation, abd soft, NTND   Neuro: no aphasia, speech clear, no dysmetria, no pronator drift  R HF slightly pain limited 4+/5 o/w strength 5/5 throughout rest of extremities   decreased sensation R foot other than R big  toe, o/w sensation intact   Extremities: distal pulses 2+ x4   Wound/incision: Posterior lumbar incision C/D/I with gauze/tegaderm, +HMV +JEAN CLAUDE    TUBES/LINES:  [] Vickers  [] Lumbar Drain  [X] Wound Drains - HMV and JEAN CLAUDE   [] Others    DIET:  [] NPO  [X] Mechanical  [] Tube feeds    LABS:                        10.3   10.67 )-----------( 341      ( 26 Jan 2023 05:30 )             32.4     01-26    138  |  104  |  15  ----------------------------<  149<H>  4.2   |  26  |  0.60    Ca    9.0      26 Jan 2023 05:30  Phos  2.5     01-26  Mg     2.1     01-26              CAPILLARY BLOOD GLUCOSE          Drug Levels: [] N/A    CSF Analysis: [] N/A      Allergies    adhesives (Blisters)  No Known Drug Allergies    Intolerances      MEDICATIONS:  Antibiotics:    Neuro:  acetaminophen     Tablet .. 1000 milliGRAM(s) Oral every 8 hours  methocarbamol 500 milliGRAM(s) Oral every 8 hours PRN  ondansetron    Tablet 4 milliGRAM(s) Oral every 6 hours  pregabalin 50 milliGRAM(s) Oral every 12 hours  traMADol 50 milliGRAM(s) Oral every 4 hours PRN    Anticoagulation:  enoxaparin Injectable 40 milliGRAM(s) SubCutaneous <User Schedule>    OTHER:  bisacodyl 5 milliGRAM(s) Oral every 12 hours PRN  influenza   Vaccine 0.5 milliLiter(s) IntraMuscular once  pantoprazole    Tablet 40 milliGRAM(s) Oral before breakfast  polyethylene glycol 3350 17 Gram(s) Oral daily  senna 2 Tablet(s) Oral at bedtime    IVF:    CULTURES:  Culture Results:   Specimen appears CONTAMINATED. Lab suggests repeat clean catch specimen. (01-23 @ 12:59)    RADIOLOGY & ADDITIONAL TESTS:      ASSESSMENT:  38 yo female s/p L5-S1 lami/disc 06/2022, presenting with persistent RLE radiculopathy and urinary retention. MRI lumbar spine reveals large herniated disc at L5-S1. Now s/p L5-S1 TLIF (1/24).      LUMBAR HERNIATED DISC    Handoff    MEWS Score    No pertinent past medical history    Lumbar herniated disc    Lumbar stenosis    Lumbar stenosis    Fusion, spine, lumbar, TLIF, 1-2 levels    Lumbar herniated disc    Lumbar herniated disc    Morbid obesity    Preoperative examination    Thrombocytosis    Overweight    Prophylactic measure    Asymptomatic bacteriuria    Leukocytosis    Anemia due to acute blood loss    Fusion, spine, lumbar, TLIF, 1-2 levels    BACK PAIN    90+    Lumbar radiculopathy    Overweight    Anemia due to acute blood loss    [email or web address]        PLAN:  NEURO:  - neuro/vitals  - pain control: ERAS  - decadron 4q6 x 6 doses post op completed   - MRI 1/23- large HNP at L5-S1  - Drains: JEAN CLAUDE + HMV, f/u output   - post op XR completed      CARDS:  - SBP normotensive    PULM:  - room air, satting well     GI:  - regular diet  - bowel regimen, last BM 1/26  - protonix while on decadron     RENAL:  - IVL  - SC prn, f/u PVRs    ENDO:  - ISS dc'd  - hold home Wegovy for weight loss    ID:  - afebrile  - +UA 1/23, no abx tx due to low wbc count on UA, asymptomatic  - trend ESR/CRP    HEME:   - SCDs, SQL for DVT prophylaxis    MISC:  - lactation specialist consulted, delivered pump to patient    Dispo:  - Regional bed  - home no PT/OT needs     D/w Dr. Horner

## 2023-01-27 NOTE — DISCHARGE NOTE NURSING/CASE MANAGEMENT/SOCIAL WORK - PATIENT PORTAL LINK FT
You can access the FollowMyHealth Patient Portal offered by [facility] by registering at the following website: [email or web address]. By joining [employer]’s FollowMyHealth portal, you will also be able to view your health information using other applications (apps) compatible with our system.

## 2023-01-27 NOTE — PROCEDURE NOTE - ESTIMATED BLOOD LOSS
Appointment with Mat Sharpe in [address] [place] [address] [place], [place] [address]  [phone]           2/11 at 3:30 pm for Suboxone .    For reference:  The following facilities offer substance use disorder assessments for patients with an active funding source -      [facility]  Tel #: [phone]     [facility]  Multiple Locations  Tel #: [phone]    [facility]   Multiple Locations  Tel #: [phone]     [facility]   Phone: [phone]  Fax: [phone]  or for walk in assessment go to;   [facility] [place] [address] Monday thru Friday, 7:00am to 2:15pm  [address], [place] [address] Saturday s, 7:45am to 10:45am  Arrive early for best chance to be seen early     [facility]   Phone [phone]   Fax [phone]    YUNIOR Rose, [facility]  CD Evaluation Counselor  [facility]  Recovery Services  [address] [place] [address]  [address] [address]  [email or web address]  Tel: [phone]  Fax: [phone]  Gender pronouns: he/him  Employed by: [facility]     
Minimal
Minimal

## 2023-01-27 NOTE — PROGRESS NOTE ADULT - PROVIDER SPECIALTY LIST ADULT
Pain Medicine
Neurosurgery
Neurosurgery
Pain Medicine
Neurosurgery
Neurosurgery
Hospitalist
Hospitalist

## 2023-01-27 NOTE — PROCEDURE NOTE - NSPOSTCAREGUIDE_GEN_A_CORE
Verbal/written post procedure instructions were given to patient/caregiver/Instructed patient/caregiver regarding signs and symptoms of infection
Verbal/written post procedure instructions were given to patient/caregiver/Instructed patient/caregiver regarding signs and symptoms of infection

## 2023-02-02 DIAGNOSIS — M48.061 SPINAL STENOSIS, LUMBAR REGION WITHOUT NEUROGENIC CLAUDICATION: ICD-10-CM

## 2023-02-02 DIAGNOSIS — M51.17 INTERVERTEBRAL DISC DISORDERS WITH RADICULOPATHY, LUMBOSACRAL REGION: ICD-10-CM

## 2023-02-02 DIAGNOSIS — R33.9 RETENTION OF URINE, UNSPECIFIED: ICD-10-CM

## 2023-02-02 DIAGNOSIS — D72.829 ELEVATED WHITE BLOOD CELL COUNT, UNSPECIFIED: ICD-10-CM

## 2023-02-02 DIAGNOSIS — D62 ACUTE POSTHEMORRHAGIC ANEMIA: ICD-10-CM

## 2023-02-02 DIAGNOSIS — D75.838 OTHER THROMBOCYTOSIS: ICD-10-CM

## 2023-02-02 DIAGNOSIS — E66.3 OVERWEIGHT: ICD-10-CM

## 2023-02-02 DIAGNOSIS — G83.4 CAUDA EQUINA SYNDROME: ICD-10-CM

## 2023-02-02 DIAGNOSIS — T38.0X5A ADVERSE EFFECT OF GLUCOCORTICOIDS AND SYNTHETIC ANALOGUES, INITIAL ENCOUNTER: ICD-10-CM

## 2023-02-10 ENCOUNTER — TRANSCRIPTION ENCOUNTER (OUTPATIENT)
Age: 38
End: 2023-02-10

## 2023-02-10 ENCOUNTER — APPOINTMENT (OUTPATIENT)
Dept: SPINE | Facility: CLINIC | Age: 38
End: 2023-02-10
Payer: COMMERCIAL

## 2023-02-10 ENCOUNTER — NON-APPOINTMENT (OUTPATIENT)
Age: 38
End: 2023-02-10

## 2023-02-10 VITALS
TEMPERATURE: 97.1 F | OXYGEN SATURATION: 95 % | SYSTOLIC BLOOD PRESSURE: 90 MMHG | WEIGHT: 180 LBS | HEIGHT: 67 IN | DIASTOLIC BLOOD PRESSURE: 60 MMHG | HEART RATE: 80 BPM | BODY MASS INDEX: 28.25 KG/M2 | RESPIRATION RATE: 17 BRPM

## 2023-02-10 DIAGNOSIS — Z48.02 ENCOUNTER FOR REMOVAL OF SUTURES: ICD-10-CM

## 2023-02-10 DIAGNOSIS — Z82.49 FAMILY HISTORY OF ISCHEMIC HEART DISEASE AND OTHER DISEASES OF THE CIRCULATORY SYSTEM: ICD-10-CM

## 2023-02-10 PROCEDURE — 99024 POSTOP FOLLOW-UP VISIT: CPT

## 2023-02-10 RX ORDER — PREGABALIN 50 MG/1
50 CAPSULE ORAL
Refills: 0 | Status: ACTIVE | COMMUNITY

## 2023-02-10 NOTE — REVIEW OF SYSTEMS
[Numbness] : numbness [Difficulty Walking] : difficulty walking [Joint Stiffness] : joint stiffness [Negative] : Genitourinary [de-identified] : RIGHT foot [FreeTextEntry9] : ache RIGHT posterior thigh

## 2023-02-10 NOTE — PHYSICAL EXAM
[General Appearance - Alert] : alert [General Appearance - Well Nourished] : well nourished [General Appearance - Well-Appearing] : healthy appearing [Oriented To Time, Place, And Person] : oriented to person, place, and time [Person] : oriented to person [Place] : oriented to place [Time] : oriented to time [4] : S1 toe walking 4/5 [5] : S1 toe walking 5/5 [No Visual Abnormalities] : no visible abnormailities [Antalgic] : antalgic [Able to toe walk] : the patient was able to toe walk [Able to heel walk] : the patient was able to heel walk [Neck Appearance] : the appearance of the neck was normal [] : no respiratory distress [Exaggerated Use Of Accessory Muscles For Inspiration] : no accessory muscle use [Antalgic Gait Right] : antalgic on the right [Skin Color & Pigmentation] : normal skin color and pigmentation [FreeTextEntry1] : lower back [Straight-Leg Raise Test - Left] : straight leg raise of the left leg was negative [Straight-Leg Raise Test - Right] : straight leg raise  of the right leg was negative

## 2023-02-10 NOTE — ASSESSMENT
[FreeTextEntry1] : \par \par Refrain from bending/twisting/lifting\par Wound care instructions:\par okay to get incision wet wash with soap and water gently\par \par avoid sun exposure to the incision site for at least 3- 6 months\par No public pools/tub baths x 6-8 weeks\par Continue to increase activity as tolerated\par I have discussed redflag symptom s in detail and he understands the importance of seeking medical attention if these do occur\par \par Recommended reinitiation of Lyrica d/t RIGHT foot numbness. Reminded patient that numbness may not resolve completely and she may still with be with residual despite surgical intervention, it will take time to appreciate how much improvement will be gotten.\par An understanding was verbalized\par Continued f/u in the office in 3 weeks with Dr Horner for progress check and evaluation will get XRAY lumbar spine at that time\par Re-educated regarding the continued needed for proper body mechanics\par Patient was advised to continue following up with primary care MD for any other medical conditions or concerns\par \par Patient reminded the she will need to PREMEDICATED prior to all dental procedures.\par NO NSAIDs for 6 weeks postop\par \par

## 2023-02-10 NOTE — REASON FOR VISIT
[de-identified] : 1.  Revision bilateral hemilaminotomy, medial facetectomy, foraminotomy, and diskectomy.\par 2.  Posterior releasing osteotomy L5S1.\par 3.  Transforaminal lumbar interbody fusion and posterior fusion L5S1.\par 4.  Posterior segmental instrumentation L5 and S1.\par 5.  Use of a biomechanical device, local autograft bone, and allograft bone to facilitate fusion [de-identified] : 1/24/2023 [de-identified] : \par \par \par Reports doing well  excruciating preop RIGHT leg pain has resolved.\par Verbalized concerns of persistent numbness right foot which seems to be painful (this was since her prior sx in June 2022) and she thinks the foot is weak\par Denies any signs of postop wound infection which could include but is not limited to redness/swelling/purulent drainage\par Denies CP/SOB/unilateral leg edema\par She is slowly introducing preop activities\par Reports continued use of oral pain medications Tylenol and has weaned Lyrica as was directed on discharge\par \par She offers no other concerns today\par

## 2023-03-01 ENCOUNTER — APPOINTMENT (OUTPATIENT)
Dept: SPINE | Facility: CLINIC | Age: 38
End: 2023-03-01
Payer: COMMERCIAL

## 2023-03-01 ENCOUNTER — NON-APPOINTMENT (OUTPATIENT)
Age: 38
End: 2023-03-01

## 2023-03-01 ENCOUNTER — OUTPATIENT (OUTPATIENT)
Dept: OUTPATIENT SERVICES | Facility: HOSPITAL | Age: 38
LOS: 1 days | End: 2023-03-01
Payer: COMMERCIAL

## 2023-03-01 ENCOUNTER — RESULT REVIEW (OUTPATIENT)
Age: 38
End: 2023-03-01

## 2023-03-01 VITALS
TEMPERATURE: 97 F | RESPIRATION RATE: 18 BRPM | DIASTOLIC BLOOD PRESSURE: 78 MMHG | HEIGHT: 67 IN | BODY MASS INDEX: 28.25 KG/M2 | SYSTOLIC BLOOD PRESSURE: 107 MMHG | OXYGEN SATURATION: 98 % | WEIGHT: 180 LBS | HEART RATE: 106 BPM

## 2023-03-01 DIAGNOSIS — Z51.89 ENCOUNTER FOR OTHER SPECIFIED AFTERCARE: ICD-10-CM

## 2023-03-01 DIAGNOSIS — R20.2 ANESTHESIA OF SKIN: ICD-10-CM

## 2023-03-01 DIAGNOSIS — R20.0 ANESTHESIA OF SKIN: ICD-10-CM

## 2023-03-01 DIAGNOSIS — M51.26 OTHER INTERVERTEBRAL DISC DISPLACEMENT, LUMBAR REGION: ICD-10-CM

## 2023-03-01 PROCEDURE — 72100 X-RAY EXAM L-S SPINE 2/3 VWS: CPT | Mod: 26

## 2023-03-01 PROCEDURE — 72100 X-RAY EXAM L-S SPINE 2/3 VWS: CPT

## 2023-03-01 PROCEDURE — 99024 POSTOP FOLLOW-UP VISIT: CPT

## 2023-03-02 NOTE — HISTORY OF PRESENT ILLNESS
[FreeTextEntry1] : \par TODAY:\par Reports doing well, although she remains concerned with residual numbness and subjective weakness RIGHT foot which has been stable.\par \par Denies any signs of postop wound infection which could include but is not limited to redness/swelling/purulent drainage\par Denies CP/SOB/unilateral leg edema\par She is slowly introducing preop activities and wishes to travel to [place] in May.\par \par \par She  offers no new symptoms/ other concerns today.\par \par Supervised physical therapy to be started 3/2/2023\par

## 2023-03-02 NOTE — ASSESSMENT
[FreeTextEntry1] : \par XRAY lumbar spine review with no obvious signs of hardware malfunction\par \par \par Continue to Refrain from bending/twisting/lifting\par Wound care instructions:\par okay to get incision wet wash with soap and water gently\par \par avoid sun exposure to the incision site for at least 3- 6 months\par No public pools/tub baths x 6-8 weeks\par Continue to increase activity as tolerated\par I have discussed red-flag symptom s in detail and he understands the importance of seeking medical attention if these do occur\par \par Reminded/reeducated patient that numbness may not resolve completely and she may still  be with residual despite surgical intervention, it will take time to appreciate how much improvement will be gotten.\par An understanding was verbalized\par \par Re-educated regarding the continued needed for proper body mechanics\par Patient was advised to continue following up with primary care MD for any other medical conditions or concerns\par \par Patient reminded the she will need to PREMEDICATED prior to all dental procedures.\par NO NSAIDs for 6 weeks postop\par \par F/U in office in 6 weeks for progress check and evaluation\par \par

## 2023-03-02 NOTE — PHYSICAL EXAM
[General Appearance - Alert] : alert [General Appearance - Well Nourished] : well nourished [General Appearance - Well-Appearing] : healthy appearing [Longitudinal] : longitudinal [Clean] : clean [Well-Healed] : well-healed [No Drainage] : without drainage [Normal Skin] : normal [Oriented To Time, Place, And Person] : oriented to person, place, and time [Person] : oriented to person [Place] : oriented to place [Time] : oriented to time [5] : S1 toe walking 5/5 [No Visual Abnormalities] : no visible abnormailities [Antalgic] : antalgic [Able to toe walk] : the patient was able to toe walk [Able to heel walk] : the patient was able to heel walk [Abnormal Walk] : normal gait [Skin Color & Pigmentation] : normal skin color and pigmentation [FreeTextEntry1] : lower back [Straight-Leg Raise Test - Left] : straight leg raise of the left leg was negative [Straight-Leg Raise Test - Right] : straight leg raise  of the right leg was negative

## 2023-03-02 NOTE — REASON FOR VISIT
[de-identified] : 1.  Revision bilateral hemilaminotomy, medial facetectomy, foraminotomy, and diskectomy.\par 2.  Posterior releasing osteotomy L5S1.\par 3.  Transforaminal lumbar interbody fusion and posterior fusion L5S1.\par 4.  Posterior segmental instrumentation L5 and S1.\par 5.  Use of a biomechanical device, local autograft bone, and allograft bone to facilitate fusion [de-identified] : 1/24/2023 [de-identified] : \par \par VISIT DATED 2/10/23\par Reports doing well  excruciating preop RIGHT leg pain has resolved.\par Verbalized concerns of persistent numbness right foot which seems to be painful (this was since her prior sx in June 2022) and she thinks the foot is weak\par Denies any signs of postop wound infection which could include but is not limited to redness/swelling/purulent drainage\par Denies CP/SOB/unilateral leg edema\par She is slowly introducing preop activities\par Reports continued use of oral pain medications Tylenol and has weaned Lyrica as was directed on discharge\par \par She offers no other concerns today\par

## 2023-04-05 ENCOUNTER — APPOINTMENT (OUTPATIENT)
Dept: SPINE | Facility: CLINIC | Age: 38
End: 2023-04-05
Payer: COMMERCIAL

## 2023-04-05 DIAGNOSIS — Z09 ENCOUNTER FOR FOLLOW-UP EXAMINATION AFTER COMPLETED TREATMENT FOR CONDITIONS OTHER THAN MALIGNANT NEOPLASM: ICD-10-CM

## 2023-04-05 PROCEDURE — 99024 POSTOP FOLLOW-UP VISIT: CPT

## 2023-04-05 NOTE — HISTORY OF PRESENT ILLNESS
[Home] : at home, [unfilled] , at the time of the visit. [Medical Office: ([facility])___] : at the medical office located in  [Verbal consent obtained from patient] : the patient, [unfilled] [FreeTextEntry1] : \par \par TODAY:\par PLEASE REFER TO ADDENDUM

## 2023-04-05 NOTE — REASON FOR VISIT
[de-identified] : 1.  Revision bilateral hemilaminotomy, medial facetectomy, foraminotomy, and diskectomy.\par 2.  Posterior releasing osteotomy L5S1.\par 3.  Transforaminal lumbar interbody fusion and posterior fusion L5S1.\par 4.  Posterior segmental instrumentation L5 and S1.\par 5.  Use of a biomechanical device, local autograft bone, and allograft bone to facilitate fusion [de-identified] : 1/24/2023 [de-identified] : \par \par VISIT DATED 2/10/23\par Reports doing well  excruciating preop RIGHT leg pain has resolved.\par Verbalized concerns of persistent numbness right foot which seems to be painful (this was since her prior sx in June 2022) and she thinks the foot is weak\par Denies any signs of postop wound infection which could include but is not limited to redness/swelling/purulent drainage\par Denies CP/SOB/unilateral leg edema\par She is slowly introducing preop activities\par Reports continued use of oral pain medications Tylenol and has weaned Lyrica as was directed on discharge\par \par She offers no other concerns today\par \par \par VISIT DATED 3/1/2023\par Reports doing well, although she remains concerned with residual numbness and subjective weakness RIGHT foot which has been stable.\par \par Denies any signs of postop wound infection which could include but is not limited to redness/swelling/purulent drainage\par Denies CP/SOB/unilateral leg edema\par She is slowly introducing preop activities and wishes to travel to [facility] in May.\par \par \par She offers no new symptoms/ other concerns today.\par \par Supervised physical therapy to be started 3/2/2023\par

## 2023-04-05 NOTE — DATA REVIEWED
[de-identified] : ACC: [id number] EXAM: XR LS SPINE AP LAT 2-3 VIEWS ORDERED BY: NI HOOPER\par \par PROCEDURE DATE: 03/01/2023\par \par \par \par INTERPRETATION: PROCEDURE: Multiple views (2) of the Lumbar spine\par \par INDICATION: Status post lumbar fusion; postop follow-up\par \par COMPARISON: Lumbar x-rays 1/26/2023\par \par FINDINGS: The patient is status post fusion at L5-S1 with vertical rods and transpedicular screws. The tips of the S1 screws project beyond the outer cortex. A interbody cage is present at L5-S1. The patient is also status post laminectomy at L5-S1. There has been interval removal of the posterior drainage catheters.\par \par There is dextroscoliosis. The vertebral body heights are maintained. The osseous structures are intact without fracture.\par \par IMPRESSION: Status post fusion L5-S1 with orthopedic hardware in place. Interval removal of drainage catheters.\par \par --- End of Report ---\par

## 2023-07-12 ENCOUNTER — OUTPATIENT (OUTPATIENT)
Dept: OUTPATIENT SERVICES | Facility: HOSPITAL | Age: 38
LOS: 1 days | End: 2023-07-12
Payer: COMMERCIAL

## 2023-07-12 ENCOUNTER — APPOINTMENT (OUTPATIENT)
Dept: SPINE | Facility: CLINIC | Age: 38
End: 2023-07-12
Payer: COMMERCIAL

## 2023-07-12 ENCOUNTER — RESULT REVIEW (OUTPATIENT)
Age: 38
End: 2023-07-12

## 2023-07-12 VITALS
OXYGEN SATURATION: 98 % | HEART RATE: 83 BPM | SYSTOLIC BLOOD PRESSURE: 119 MMHG | DIASTOLIC BLOOD PRESSURE: 81 MMHG | TEMPERATURE: 98.1 F

## 2023-07-12 PROCEDURE — 99212 OFFICE O/P EST SF 10 MIN: CPT

## 2023-07-12 PROCEDURE — 72110 X-RAY EXAM L-2 SPINE 4/>VWS: CPT | Mod: 26

## 2023-07-12 PROCEDURE — 72110 X-RAY EXAM L-2 SPINE 4/>VWS: CPT

## 2023-07-12 NOTE — PHYSICAL EXAM
[Oriented To Time, Place, And Person] : oriented to person, place, and time [No Visual Abnormalities] : no visible abnormailities [Normal] : normal [Able to toe walk] : the patient was able to toe walk [Able to heel walk] : the patient was able to heel walk [Neck Appearance] : the appearance of the neck was normal [] : no respiratory distress [Heart Sounds] : normal S1 and S2 [Abnormal Walk] : normal gait

## 2023-07-15 NOTE — HISTORY OF PRESENT ILLNESS
[FreeTextEntry1] : CLARE YEN is status post 1. Revision bilateral hemilaminotomy, medial facetectomy, foraminotomy, and diskectomy.\par 2. Posterior releasing osteotomy L5_S1.\par 3. Transforaminal lumbar interbody fusion and posterior fusion L5_S1.\par 4. Posterior segmental instrumentation L5 and S1.\par 5. Use of a biomechanical device, local autograft bone, and allograft bone to facilitate fusion and she is here for a post-op visit. \par Surgery Date: 1/24/2023 \par \par VISIT DATED 2/10/23\par Reports doing well excruciating preop RIGHT leg pain has resolved.\par Verbalized concerns of persistent numbness right foot which seems to be painful (this was since her prior sx in June 2022) and she thinks the foot is weak\par Denies any signs of postop wound infection which could include but is not limited to redness/swelling/purulent drainage\par Denies CP/SOB/unilateral leg edema\par She is slowly introducing preop activities\par Reports continued use of oral pain medications Tylenol and has weaned Lyrica as was directed on discharge\par \par She offers no other concerns during that visit \par \par VISIT DATED 3/1/2023\par Reports doing well, although she remains concerned with residual numbness and subjective weakness RIGHT foot which has been stable.\par \par Denies any signs of postop wound infection which could include but is not limited to redness/swelling/purulent drainage\par Denies CP/SOB/unilateral leg edema\par She is slowly introducing preop activities and wishes to travel to [facility] in May.\par \par She offers no new symptoms/ other concerns at that time.\par \par Supervised physical therapy to be started 3/2/2023\par \par \par TODAY'S VISIT 7/12/2023\par Pt reports feeling overall well. She reports that her terrible nerve pain that she had prior to surgery is subsided.\par She admits to mild lower back stiffness (which improved with physical therapy) and +numbness in R foot except her toe. + pain/numbness in her R leg when smth is pressing against the back of her leg (sometimes noted during PT).\par No other issues/concerns at this time \par

## 2023-07-15 NOTE — ASSESSMENT
[FreeTextEntry1] : 37 y/o F s/p TLIF L5-S1 in Jan 2023\par \par here for routine follow up visit\par \par +back stiffness, and +numbness/pain in R foot except toe\par Overall, much improved from nerve pain prior to surgery\par \par Tolerating PT well\par \par Plan\par - c/w Physical Therapy sessions\par - RTC in 6 months (around Jan 2024)\par - Will need Xray L spine at this time\par - Call office if any issues/concerns arising before follow up time\par \par All questions answered, patient verbalizes understanding of medical plan/treatment

## 2023-09-14 RX ORDER — METHOCARBAMOL 500 MG/1
500 TABLET, FILM COATED ORAL 3 TIMES DAILY
Qty: 90 | Refills: 0 | Status: ACTIVE | COMMUNITY
Start: 2023-09-14 | End: 1900-01-01

## 2023-11-25 NOTE — DISCHARGE NOTE NURSING/CASE MANAGEMENT/SOCIAL WORK - NSDCFUADDAPPT_GEN_ALL_CORE_FT
Please schedule a follow up appointment with Dr. D'Amico in 1-2 weeks by calling the office number [phone].     Please follow up with Dr. Dick for pain management.     Please follow up with your pediatrician/lactation specialist to confirm pain medications while lactating.     Please follow up with your primary care provider. 
No

## 2023-12-12 NOTE — PACU DISCHARGE NOTE - MENTAL STATUS: PATIENT PARTICIPATION
How Many Mls Were Removed From The 40 Mg/Ml (1ml) Vial When Preparing The Injectable Solution?: 0 Lot # For Kenalog (Optional): [id number] Validate Note Data When Using Inventory: Yes Medical Necessity Clause: This procedure was medically necessary because the lesions that were treated were: Administered By (Optional): Justina Gill PA-C Kenalog Preparation: Kenalog Awake Total Volume (Ccs): 0.7 Consent: The risks of atrophy were reviewed with the patient. Treatment Number (Optional): 3 Show Inventory Tab: Hide Expiration Date For Kenalog (Optional): AUG 2024 Ndc# For Kenalog Only: 9600-9095-39 Concentration Of Kenalog Solution Injected (Mg/Ml): 10.0 Detail Level: Detailed Include Z78.9 (Other Specified Conditions Influencing Health Status) As An Associated Diagnosis?: No Kenalog Type Of Vial: Multiple Dose

## 2023-12-20 ENCOUNTER — OUTPATIENT (OUTPATIENT)
Dept: OUTPATIENT SERVICES | Facility: HOSPITAL | Age: 38
LOS: 1 days | End: 2023-12-20
Payer: COMMERCIAL

## 2023-12-20 ENCOUNTER — APPOINTMENT (OUTPATIENT)
Dept: SPINE | Facility: CLINIC | Age: 38
End: 2023-12-20
Payer: COMMERCIAL

## 2023-12-20 VITALS
HEIGHT: 67 IN | SYSTOLIC BLOOD PRESSURE: 121 MMHG | DIASTOLIC BLOOD PRESSURE: 84 MMHG | TEMPERATURE: 98 F | WEIGHT: 180 LBS | BODY MASS INDEX: 28.25 KG/M2 | OXYGEN SATURATION: 99 % | HEART RATE: 76 BPM

## 2023-12-20 PROCEDURE — 99212 OFFICE O/P EST SF 10 MIN: CPT

## 2023-12-20 PROCEDURE — 72082 X-RAY EXAM ENTIRE SPI 2/3 VW: CPT

## 2023-12-20 PROCEDURE — 72082 X-RAY EXAM ENTIRE SPI 2/3 VW: CPT | Mod: 26

## 2023-12-20 RX ORDER — METHOCARBAMOL 500 MG/1
500 TABLET, FILM COATED ORAL 3 TIMES DAILY
Qty: 90 | Refills: 2 | Status: ACTIVE | COMMUNITY
Start: 2023-12-20 | End: 1900-01-01

## 2023-12-22 NOTE — HISTORY OF PRESENT ILLNESS
[FreeTextEntry1] : VISIT DATED 2/10/23 Reports doing well  excruciating preop RIGHT leg pain has resolved. Verbalized concerns of persistent numbness right foot which seems to be painful (this was since her prior sx in June 2022) and she thinks the foot is weak Denies any signs of postop wound infection which could include but is not limited to redness/swelling/purulent drainage Denies CP/SOB/unilateral leg edema She is slowly introducing preop activities Reports continued use of oral pain medications Tylenol and has weaned Lyrica as was directed on discharge She offers no other concerns today   VISIT DATED 3/1/2023 ReportsDenies any signs of postop wound infection which could include but is not limited to redness/swelling/purulent drainage Denies CP/SOB/unilateral leg edema She is slowly introducing preop activities and wishes to travel to [facility] in May. She offers no new symptoms/ other concerns today. Supervised physical therapy to be started 3/2/2023  doing well, although she remains concerned with residual numbness and subjective weakness RIGHT foot which has been stable.  Today 12/20/23 patient reporting back pain has improved significantly after surgery. She still reports right foot numbness and pain in the right posterior thigh with prolonged sitting/walking/standing.

## 2023-12-22 NOTE — ASSESSMENT
[FreeTextEntry1] : Plan:  Continue PT Follow up in 1 year or if any new worsening neurological symptoms.

## 2023-12-22 NOTE — REASON FOR VISIT
[de-identified] : 1.  Revision bilateral hemilaminotomy, medial facetectomy, foraminotomy, and diskectomy.\par  2.  Posterior releasing osteotomy L5S1.\par  3.  Transforaminal lumbar interbody fusion and posterior fusion L5S1.\par  4.  Posterior segmental instrumentation L5 and S1.\par  5.  Use of a biomechanical device, local autograft bone, and allograft bone to facilitate fusion [de-identified] : 1/24/2023

## 2024-03-04 RX ORDER — GABAPENTIN 400 MG/1
300 CAPSULE ORAL
Qty: 0 | Refills: 0 | DISCHARGE

## 2024-03-04 RX ORDER — ACETAMINOPHEN 500 MG
2 TABLET ORAL
Qty: 0 | Refills: 0 | DISCHARGE

## 2024-03-04 RX ORDER — KETOROLAC TROMETHAMINE 30 MG/ML
1 SYRINGE (ML) INJECTION
Qty: 0 | Refills: 0 | DISCHARGE

## 2024-03-04 RX ORDER — TIRZEPATIDE 15 MG/.5ML
5 INJECTION, SOLUTION SUBCUTANEOUS
Qty: 0 | Refills: 0 | DISCHARGE

## 2024-03-04 RX ORDER — HYDROMORPHONE HYDROCHLORIDE 2 MG/ML
0 INJECTION INTRAMUSCULAR; INTRAVENOUS; SUBCUTANEOUS
Qty: 0 | Refills: 0 | DISCHARGE

## 2024-05-23 PROBLEM — M51.26 OTHER INTERVERTEBRAL DISC DISPLACEMENT, LUMBAR REGION: Chronic | Status: ACTIVE | Noted: 2022-06-20

## 2024-06-05 ENCOUNTER — RESULT REVIEW (OUTPATIENT)
Age: 39
End: 2024-06-05

## 2024-06-05 DIAGNOSIS — Z98.890 OTHER SPECIFIED POSTPROCEDURAL STATES: ICD-10-CM

## 2024-06-05 DIAGNOSIS — Z98.1 ARTHRODESIS STATUS: ICD-10-CM

## 2024-06-07 ENCOUNTER — APPOINTMENT (OUTPATIENT)
Dept: SPINE | Facility: CLINIC | Age: 39
End: 2024-06-07
Payer: COMMERCIAL

## 2024-06-07 ENCOUNTER — OUTPATIENT (OUTPATIENT)
Dept: OUTPATIENT SERVICES | Facility: HOSPITAL | Age: 39
LOS: 1 days | End: 2024-06-07
Payer: COMMERCIAL

## 2024-06-07 VITALS
HEART RATE: 74 BPM | SYSTOLIC BLOOD PRESSURE: 125 MMHG | WEIGHT: 161 LBS | OXYGEN SATURATION: 98 % | HEIGHT: 67 IN | DIASTOLIC BLOOD PRESSURE: 87 MMHG | RESPIRATION RATE: 18 BRPM | BODY MASS INDEX: 25.27 KG/M2

## 2024-06-07 DIAGNOSIS — M96.1 POSTLAMINECTOMY SYNDROME, NOT ELSEWHERE CLASSIFIED: ICD-10-CM

## 2024-06-07 DIAGNOSIS — G83.4 CAUDA EQUINA SYNDROME: ICD-10-CM

## 2024-06-07 PROCEDURE — 72083 X-RAY EXAM ENTIRE SPI 4/5 VW: CPT

## 2024-06-07 PROCEDURE — 99213 OFFICE O/P EST LOW 20 MIN: CPT

## 2024-06-07 PROCEDURE — 72083 X-RAY EXAM ENTIRE SPI 4/5 VW: CPT | Mod: 26

## 2024-06-07 RX ORDER — NORTRIPTYLINE HYDROCHLORIDE 25 MG/1
25 CAPSULE ORAL 3 TIMES DAILY
Qty: 90 | Refills: 0 | Status: ACTIVE | COMMUNITY
Start: 2024-06-07 | End: 1900-01-01

## 2024-06-07 NOTE — PHYSICAL EXAM
[5] : S1 toe walking 5/5 [Dysesthesia] : dysesthesia was present [Abnormal Walk] : normal gait [1+] : Ankle jerk left 1+

## 2024-06-07 NOTE — ASSESSMENT
[FreeTextEntry1] : stable lumbar fusion. Given new onset of symptoms with failed extensive conservative management, further diagnostic test is recommended prior to any treatment plan.  PLAN - MRI L-spine wo - nortriptyline 25 mg - f/u after MRI to review

## 2024-06-07 NOTE — HISTORY OF PRESENT ILLNESS
[FreeTextEntry1] : VISIT DATED 2/10/23 Reports doing well  excruciating preop RIGHT leg pain has resolved. Verbalized concerns of persistent numbness right foot which seems to be painful (this was since her prior sx in June 2022) and she thinks the foot is weak Denies any signs of postop wound infection which could include but is not limited to redness/swelling/purulent drainage Denies CP/SOB/unilateral leg edema She is slowly introducing preop activities Reports continued use of oral pain medications Tylenol and has weaned Lyrica as was directed on discharge She offers no other concerns today   VISIT DATED 3/1/2023 ReportsDenies any signs of postop wound infection which could include but is not limited to redness/swelling/purulent drainage Denies CP/SOB/unilateral leg edema She is slowly introducing preop activities and wishes to travel to [facility] in May. She offers no new symptoms/ other concerns today. Supervised physical therapy to be started 3/2/2023  doing well, although she remains concerned with residual numbness and subjective weakness RIGHT foot which has been stable.  Office Visit 12/20/23 patient reporting back pain has improved significantly after surgery. She still reports right foot numbness and pain in the right posterior thigh with prolonged sitting/walking/standing.

## 2024-06-07 NOTE — REASON FOR VISIT
[FreeTextEntry1] : today she reports new onset of progressively worsening R leg/foot spasm, pain for the past 1.5 month without injury. Symptoms are worsening by prolonged sitting/standing/walking. She states her R foot (bottom except great toe) numbness has not been improved since the surgery and noticed some painful sensation on the same area. She has been taking Robaxin with some relief but cause significant grogginess so only taking at night time also previously tried PT for7 months without relief so stopped. She denies LE weakness/BB dysfunction, saddle anesthesia.  Currently ambulates without assistive device. [de-identified] : 1.  Revision bilateral hemilaminotomy, medial facetectomy, foraminotomy, and diskectomy.\par  2.  Posterior releasing osteotomy L5S1.\par  3.  Transforaminal lumbar interbody fusion and posterior fusion L5S1.\par  4.  Posterior segmental instrumentation L5 and S1.\par  5.  Use of a biomechanical device, local autograft bone, and allograft bone to facilitate fusion [de-identified] : 1/24/2023

## 2024-07-16 NOTE — HISTORY OF PRESENT ILLNESS
[FreeTextEntry1] : VISIT DATED 2/10/23 Reports doing well  excruciating preop RIGHT leg pain has resolved. Verbalized concerns of persistent numbness right foot which seems to be painful (this was since her prior sx in June 2022) and she thinks the foot is weak Denies any signs of postop wound infection which could include but is not limited to redness/swelling/purulent drainage Denies CP/SOB/unilateral leg edema She is slowly introducing preop activities Reports continued use of oral pain medications Tylenol and has weaned Lyrica as was directed on discharge She offers no other concerns today   VISIT DATED 3/1/2023 ReportsDenies any signs of postop wound infection which could include but is not limited to redness/swelling/purulent drainage Denies CP/SOB/unilateral leg edema She is slowly introducing preop activities and wishes to travel to [facility] in May. She offers no new symptoms/ other concerns today. Supervised physical therapy to be started 3/2/2023  doing well, although she remains concerned with residual numbness and subjective weakness RIGHT foot which has been stable.  Office Visit 12/20/23 patient reporting back pain has improved significantly after surgery. She still reports right foot numbness and pain in the right posterior thigh with prolonged sitting/walking/standing.   Office Visit 06/07/2024 Today she reports new onset of progressively worsening R leg/foot spasm, pain for the past 1.5 month without injury. Symptoms are worsening by prolonged sitting/standing/walking. She states her R foot (bottom except great toe) numbness has not been improved since the surgery and noticed some painful sensation on the same area. She has been taking Robaxin with some relief but cause significant grogginess so only taking at night time also previously tried PT for7 months without relief so stopped. She denies LE weakness/BB dysfunction, saddle anesthesia.  Currently ambulates without assistive device.  Today 07/25/2024 here to review lumbar MRI.

## 2024-07-16 NOTE — PHYSICAL EXAM
[General Appearance - Alert] : alert [Person] : oriented to person [Place] : oriented to place [Time] : oriented to time [5] : S1 toe walking 5/5 [Dysesthesia] : dysesthesia was present [Abnormal Walk] : normal gait [1+] : Ankle jerk left 1+

## 2024-07-16 NOTE — REASON FOR VISIT
[Follow-Up: _____] : a [unfilled] follow-up visit [de-identified] : 1.  Revision bilateral hemilaminotomy, medial facetectomy, foraminotomy, and diskectomy.\par  2.  Posterior releasing osteotomy L5S1.\par  3.  Transforaminal lumbar interbody fusion and posterior fusion L5S1.\par  4.  Posterior segmental instrumentation L5 and S1.\par  5.  Use of a biomechanical device, local autograft bone, and allograft bone to facilitate fusion [de-identified] : 1/24/2023

## 2024-07-16 NOTE — RESULTS/DATA
[FreeTextEntry1] : EXAM:  MRI LUMBAR SPINE WITHOUT CONTRAST HISTORY: Low back pain radiating to right leg. TECHNIQUE: Sagittal T1, T2 and STIR, and axial T1 and T2 weighted sequences were obtained.   No intravenous contrast was administered. Images were acquired on a [employer] [id number] wide bore at 1.5T. FINDINGS: A comparison is made to the previous MRI the lumbar spine from 10/24/2022. Vertebral body height and marrow signal are normal. The discs remain normal in height and signal with intact annulus at T12-L1, L1-2 and L2-3. At L3-4 there is disc desiccation and bulging of disc annulus, posterior annular fissure that increased in length and mild facet arthropathy.  At L4-5 there is disc desiccation and bulging of the disc annulus and a tiny annular fissure. At L5-S1 the patient underwent laminectomy and posterior fusion with interbody fusion. There is resolution of the herniated disc that was present and the canal stenosis and nerve root impingement. There is no intradural lesion.  The conus medullaris is unremarkable. There is no paraspinal mass. IMPRESSION:  Interval laminectomy and posterior fusion with interbody fusion at L5-S1 and normal postoperative changes. Resolution of the herniated disc that was present at the postoperative level. Disc bulge and annular and mild facet arthropathy at L3-4. Disc bulge and tiny posterior annular fissure at L4-5. Thank you for the opportunity to participate in the care of this patient.

## 2024-07-18 ENCOUNTER — APPOINTMENT (OUTPATIENT)
Dept: SPINE | Facility: CLINIC | Age: 39
End: 2024-07-18
Payer: COMMERCIAL

## 2024-07-18 PROCEDURE — 99212 OFFICE O/P EST SF 10 MIN: CPT

## 2024-07-25 ENCOUNTER — NON-APPOINTMENT (OUTPATIENT)
Age: 39
End: 2024-07-25

## 2024-07-25 DIAGNOSIS — M48.061 SPINAL STENOSIS, LUMBAR REGION WITHOUT NEUROGENIC CLAUDICATION: ICD-10-CM

## 2024-07-25 DIAGNOSIS — M51.26 OTHER INTERVERTEBRAL DISC DISPLACEMENT, LUMBAR REGION: ICD-10-CM

## 2024-09-17 ENCOUNTER — TRANSCRIPTION ENCOUNTER (OUTPATIENT)
Age: 39
End: 2024-09-17

## 2024-11-21 ENCOUNTER — APPOINTMENT (OUTPATIENT)
Dept: SPINE | Facility: CLINIC | Age: 39
End: 2024-11-21

## 2024-11-21 ENCOUNTER — RESULT REVIEW (OUTPATIENT)
Age: 39
End: 2024-11-21

## 2024-11-21 ENCOUNTER — OUTPATIENT (OUTPATIENT)
Dept: OUTPATIENT SERVICES | Facility: HOSPITAL | Age: 39
LOS: 1 days | End: 2024-11-21
Payer: COMMERCIAL

## 2024-11-21 VITALS
WEIGHT: 165.13 LBS | SYSTOLIC BLOOD PRESSURE: 115 MMHG | DIASTOLIC BLOOD PRESSURE: 80 MMHG | BODY MASS INDEX: 25.92 KG/M2 | RESPIRATION RATE: 18 BRPM | HEART RATE: 94 BPM | HEIGHT: 67 IN | OXYGEN SATURATION: 98 %

## 2024-11-21 DIAGNOSIS — M48.061 SPINAL STENOSIS, LUMBAR REGION WITHOUT NEUROGENIC CLAUDICATION: ICD-10-CM

## 2024-11-21 PROCEDURE — 99213 OFFICE O/P EST LOW 20 MIN: CPT

## 2024-11-21 PROCEDURE — 72083 X-RAY EXAM ENTIRE SPI 4/5 VW: CPT | Mod: 26

## 2024-11-21 PROCEDURE — 72083 X-RAY EXAM ENTIRE SPI 4/5 VW: CPT

## 2024-11-21 RX ORDER — METHOCARBAMOL 500 MG/1
500 TABLET, FILM COATED ORAL 3 TIMES DAILY
Qty: 90 | Refills: 2 | Status: ACTIVE | COMMUNITY
Start: 2024-11-21 | End: 1900-01-01

## 2025-01-30 ENCOUNTER — APPOINTMENT (OUTPATIENT)
Dept: SPINE | Facility: CLINIC | Age: 40
End: 2025-01-30

## 2025-01-30 DIAGNOSIS — Z98.1 ARTHRODESIS STATUS: ICD-10-CM

## 2025-08-07 DIAGNOSIS — S34.129A: ICD-10-CM

## (undated) DEVICE — DRAPE 1/2 SHEET 40X57"

## (undated) DEVICE — STRYKER BONE MILL BLADE FINE 3.2MM

## (undated) DEVICE — DRAPE GENERAL ENDOSCOPY

## (undated) DEVICE — SPONGE PEANUT AUTO COUNT

## (undated) DEVICE — POLISHER OR CAUTERY TIP

## (undated) DEVICE — SUT VICRYL PLUS 2-0 18" CT-2 (POP-OFF)

## (undated) DEVICE — PACK SPINE

## (undated) DEVICE — FOLEY TRAY 16FR 5CC LF UMETER CLOSED

## (undated) DEVICE — ELCTR STRYKER NEPTUNE SMOKE EVACUATION PENCIL (GREEN)

## (undated) DEVICE — MIDAS REX LEGEND MATCH HEAD FLUTED LG BORE 3.0MM X 14CM

## (undated) DEVICE — SUT ETHILON 3-0 18" PS-1

## (undated) DEVICE — DRAPE FOR 2 TIER TABLE W/ 8" BACK 72" LONG

## (undated) DEVICE — DRAIN JACKSON PRATT 10MM FLAT 3/4 NO TROCAR

## (undated) DEVICE — MIDAS REX LEGEND MATCH HEAD FLUTED SM BORE 3.0MM X 10CM

## (undated) DEVICE — STAPLER SKIN PROXIMATE

## (undated) DEVICE — MARKING PEN W RULER

## (undated) DEVICE — DRSG DERMABOND PRINEO 22CM

## (undated) DEVICE — STRYKER BONE MILL BLADE MEDIUM 5.0MM

## (undated) DEVICE — SUT MONOCRYL 4-0 27" PS-2 UNDYED

## (undated) DEVICE — DRAPE LIGHT HANDLE COVER (GREEN)

## (undated) DEVICE — Device

## (undated) DEVICE — NDL SPINAL 18G X 3.5" (PINK)

## (undated) DEVICE — STRYKER INSTRUMENT BATTERY

## (undated) DEVICE — ELCTR AQUAMANTYS BIPOLAR SEALER 6.0

## (undated) DEVICE — PREP CHLORAPREP HI-LITE ORANGE 26ML

## (undated) DEVICE — ELCTR BOVIE TIP BLADE INSULATED 2.75" EDGE

## (undated) DEVICE — SPONGE SURGICAL STRIP 1/2 X 6"

## (undated) DEVICE — SUT VICRYL PLUS 0 36" CT-1

## (undated) DEVICE — DRAPE 3/4 SHEET 52X76"

## (undated) DEVICE — DRSG TEGADERM 4X4.75"

## (undated) DEVICE — FEEDING TUBE ENTERAL KANGAROO CONNECT ENPLUS SPIKE SET

## (undated) DEVICE — DRAPE TOP SHEET 53" X 101"

## (undated) DEVICE — NDL HYPO SAFE 22G X 1.5" (BLACK)

## (undated) DEVICE — DRAIN JACKSON PRATT 3 SPRING RESERVOIR W 10FR PVC DRAIN

## (undated) DEVICE — ELCTR BOVIE TIP BLADE INSULATED 4" EDGE

## (undated) DEVICE — COVER BACK TABLE STRL 80/110IN 10/CA

## (undated) DEVICE — GLV 8 PROTEXIS ORTHO (CREAM)

## (undated) DEVICE — DRAPE INSTRUMENT POUCH 6.75" X 11"